# Patient Record
Sex: MALE | Race: WHITE | NOT HISPANIC OR LATINO | Employment: OTHER | URBAN - METROPOLITAN AREA
[De-identification: names, ages, dates, MRNs, and addresses within clinical notes are randomized per-mention and may not be internally consistent; named-entity substitution may affect disease eponyms.]

---

## 2018-01-31 DIAGNOSIS — E78.5 HYPERLIPIDEMIA, UNSPECIFIED HYPERLIPIDEMIA TYPE: ICD-10-CM

## 2018-01-31 NOTE — TELEPHONE ENCOUNTER
atorvastatin (LIPITOR)  Last Written Prescription Date:  11/23/16  Last Fill Quantity: 100,   # refills: 3  Last Office Visit :11/23/16  Future Office visit: 2/19/18

## 2018-02-03 RX ORDER — ATORVASTATIN CALCIUM 10 MG/1
10 TABLET, FILM COATED ORAL DAILY
Qty: 30 TABLET | Refills: 0 | Status: SHIPPED | OUTPATIENT
Start: 2018-02-03 | End: 2018-02-19

## 2018-02-19 ENCOUNTER — OFFICE VISIT (OUTPATIENT)
Dept: INTERNAL MEDICINE | Facility: CLINIC | Age: 63
End: 2018-02-19
Payer: COMMERCIAL

## 2018-02-19 VITALS
DIASTOLIC BLOOD PRESSURE: 83 MMHG | HEIGHT: 71 IN | SYSTOLIC BLOOD PRESSURE: 134 MMHG | HEART RATE: 69 BPM | BODY MASS INDEX: 27.79 KG/M2 | WEIGHT: 198.5 LBS

## 2018-02-19 DIAGNOSIS — M72.2 PLANTAR FASCIITIS: ICD-10-CM

## 2018-02-19 DIAGNOSIS — N52.9 ERECTILE DYSFUNCTION, UNSPECIFIED ERECTILE DYSFUNCTION TYPE: Primary | ICD-10-CM

## 2018-02-19 DIAGNOSIS — Z23 NEED FOR PROPHYLACTIC VACCINATION WITH TETANUS-DIPHTHERIA (TD): ICD-10-CM

## 2018-02-19 DIAGNOSIS — E78.5 HYPERLIPIDEMIA, UNSPECIFIED HYPERLIPIDEMIA TYPE: ICD-10-CM

## 2018-02-19 DIAGNOSIS — Z11.59 NEED FOR HEPATITIS C SCREENING TEST: ICD-10-CM

## 2018-02-19 DIAGNOSIS — Z91.038 HYMENOPTERA ALLERGY: ICD-10-CM

## 2018-02-19 LAB
CHOLEST SERPL-MCNC: 186 MG/DL
HCV AB SERPL QL IA: NONREACTIVE
HDLC SERPL-MCNC: 44 MG/DL
LDLC SERPL CALC-MCNC: 100 MG/DL
NONHDLC SERPL-MCNC: 142 MG/DL
TRIGL SERPL-MCNC: 208 MG/DL

## 2018-02-19 RX ORDER — SILDENAFIL 100 MG/1
100 TABLET, FILM COATED ORAL DAILY PRN
Qty: 88 TABLET | Refills: 1 | Status: SHIPPED | OUTPATIENT
Start: 2018-02-19 | End: 2018-08-16

## 2018-02-19 RX ORDER — SILDENAFIL 100 MG/1
100 TABLET, FILM COATED ORAL DAILY PRN
Qty: 18 TABLET | Refills: 3 | Status: SHIPPED | OUTPATIENT
Start: 2018-02-19 | End: 2018-02-19

## 2018-02-19 RX ORDER — ATORVASTATIN CALCIUM 10 MG/1
10 TABLET, FILM COATED ORAL DAILY
Qty: 100 TABLET | Refills: 3 | Status: SHIPPED | OUTPATIENT
Start: 2018-02-19 | End: 2019-02-17

## 2018-02-19 RX ORDER — EPINEPHRINE 0.3 MG/.3ML
0.3 INJECTION SUBCUTANEOUS
Qty: 0.6 ML | Refills: 3 | Status: SHIPPED | OUTPATIENT
Start: 2018-02-19 | End: 2019-06-18

## 2018-02-19 ASSESSMENT — PAIN SCALES - GENERAL: PAINLEVEL: NO PAIN (0)

## 2018-02-19 NOTE — PROGRESS NOTES
HPI  62-year-old retired  from [employer] presents today for physical examination.  He has been doing well over the past year.  With the stretching and roller stretching of the IT band he resolved the symptoms.  More recently he is experienced right plantar fasciitis.  This occurred when he was jogging without his orthotics.  He has been doing some stretching for this and it is significantly improved but has not resolved.  He is wondering about returning to running.  He is also had some latency and difficulty maintaining erections and is interested in medication for this and we discussed the use of Viagra.  In the past they caused precluded him from getting this.  He just recently started the atorvastatin is tolerated it well he has not had any muscle aching or discomfort associated with this despite his physical activity.  He has had some skin lesions which he is wondering about but none have significantly changed in size.  His blood pressure is being monitored at home and is consistently running less than 130/80 by his report.  We again reviewed nonpharmacologic blood pressure control measures.  Past Medical History:   Diagnosis Date     Hyperlipemia      It band syndrome, right 11/23/2016     Labile hypertension      Plantar fasciitis 2/19/2018     Past Surgical History:   Procedure Laterality Date     thyroglossal duct cystectomy       TONSILLECTOMY       VASECTOMY       Family History   Problem Relation Age of Onset     Coronary Artery Disease Maternal Grandmother      Other Cancer Father      liver     Hypertension Mother      Asthma Sister      Social History     Social History     Marital status:      Spouse name: N/A     Number of children: N/A     Years of education: N/A     Social History Main Topics     Smoking status: Never Smoker     Smokeless tobacco: Never Used     Alcohol use 4.2 oz/week     7 Standard drinks or equivalent per week     Drug use: No     Sexual activity:  "Not Asked     Other Topics Concern     None     Social History Narrative     Answers for HPI/ROS submitted by the patient on 2/19/2018   General Symptoms: No  Skin Symptoms: No  HENT Symptoms: No  EYE SYMPTOMS: No  HEART SYMPTOMS: No  LUNG SYMPTOMS: No  INTESTINAL SYMPTOMS: No  URINARY SYMPTOMS: No  REPRODUCTIVE SYMPTOMS: No  SKELETAL SYMPTOMS: No  BLOOD SYMPTOMS: No  NERVOUS SYSTEM SYMPTOMS: No  MENTAL HEALTH SYMPTOMS: No    Exam:  /83  Pulse 69  Ht 1.802 m (5' 10.95\")  Wt 90 kg (198 lb 8 oz)  BMI 27.73 kg/m2  198 lbs 8 oz  Physical Exam   The patient is alert, oriented with a clear sensorium.   Skin shows no lesions or rashes and good turgor.   Head is normocephalic and atraumatic.   Eyes show PERRLA with benign optic fundi.   Ears show normal TMs bilaterally.   Mouth shows clear oral mucosa.   Neck shows no nodes, thyromegaly or bruits.   Back is non tender.  Lungs are clear to percussion and auscultation.   Heart shows normal S1 and S2 without murmur or gallop.   Abdomen is soft and nontender without masses or organomegaly.   Genitalia show normal testes. No evidence of inguinal hernia.  Extremities show no edema and no evidence of active synovitis.   Neurologic examination shows cranial nerves II-XII intact. Motor shows 5/5 strength. Reflexes are symmetric. Cerebellar testing shows normal tandem gait.  Romberg negative.    ASSESSMENT  1 borderline hypertension well-controlled at home  2 hyperlipidemia needs reevaluation  3 erectile dysfunction  4 right plantar fasciitis  5 IT band friction syndrome resolved  6 history of severe hymenoptera allergy    Plan  We will assess his lipids today was prescribed Viagra because of his hymenoptera allergy to give a prescription for an EpiPen we refilled his atorvastatin he will follow nonpharmacologic blood pressure control measures and reassess in a year    This note was completed using Dragon voice recognition software.  Although reviewed after completion, some " word and grammatical errors may occur.    Ovidio Franco MD  General Internal Medicine  [facility]  [phone]

## 2018-02-19 NOTE — PATIENT INSTRUCTIONS
[facility]: [phone]     [facility] Medication Refill Request Information:  * Please contact your pharmacy regarding ANY request for medication refills.  ** [facility] Prescription Fax = [phone]  * Please allow 3 business days for routine medication refills.  * Please allow 5 business days for controlled substance medication refills.     [facility] Test Result notification information:  *You will be notified with in 7-10 days of your appointment day regarding the results of your test.  If you are on MyChart you will be notified as soon as the provider has reviewed the results and signed off on them.

## 2018-02-19 NOTE — NURSING NOTE
Chief Complaint   Patient presents with     Physical     Patient here for physical.       Noelle Bond CMA at 3:08 PM on 2/19/2018.

## 2018-02-19 NOTE — MR AVS SNAPSHOT
After Visit Summary   2/19/2018    Carlitos Hernandez    MRN: [id number]           Patient Information     Date Of Birth          1955        Visit Information        Provider Department      2/19/2018 3:40 PM Ovidio Franco MD [facility]        Today's Diagnoses     Erectile dysfunction, unspecified erectile dysfunction type    -  1    Need for hepatitis C screening test        Need for prophylactic vaccination with tetanus-diphtheria (TD)        Hyperlipidemia, unspecified hyperlipidemia type        Hymenoptera allergy          Care Instructions    [facility]: [phone]     [facility] Medication Refill Request Information:  * Please contact your pharmacy regarding ANY request for medication refills.  ** [facility] Prescription Fax = [phone]  * Please allow 3 business days for routine medication refills.  * Please allow 5 business days for controlled substance medication refills.     [facility] Test Result notification information:  *You will be notified with in 7-10 days of your appointment day regarding the results of your test.  If you are on MyChart you will be notified as soon as the provider has reviewed the results and signed off on them.            Follow-ups after your visit        Future tests that were ordered for you today     Open Future Orders        Priority Expected Expires Ordered    Lipid Profile Routine  2/19/2019 2/19/2018    Hepatitis C Screen Reflex to HCV RNA Quant and Genotype Routine 2/19/2018 2/19/2019 2/19/2018            Who to contact     Please call your clinic at [phone] to:    Ask questions about your health    Make or cancel appointments    Discuss your medicines    Learn about your test results    Speak to your doctor            Additional Information About Your Visit        MyChart Information     [employer] is an electronic gateway that provides easy, online access to your medical records. With [employer], you can  "request a clinic appointment, read your test results, renew a prescription or communicate with your care team.     To sign up for [employer] visit the website at [email or web address]   You will be asked to enter the access code listed below, as well as some personal information. Please follow the directions to create your username and password.     Your access code is: [email or web address]  Expires: 2018  6:30 AM     Your access code will  in 90 days. If you need help or a new code, please contact your [facility] or call [phone] for assistance.        Care EveryWhere ID     This is your Care EveryWhere ID. This could be used by other organizations to access your [place] medical records  [id number]        Your Vitals Were     Pulse Height BMI (Body Mass Index)             69 1.802 m (5' 10.95\") 27.73 kg/m2          Blood Pressure from Last 3 Encounters:   18 134/83   16 (!) 166/92    Weight from Last 3 Encounters:   18 90 kg (198 lb 8 oz)   16 87.1 kg (192 lb)              We Performed the Following     TDAP ( BOOSTRIX AGES 10-64)     TDAP VACCINE (BOOSTRIX)          Today's Medication Changes          These changes are accurate as of 18  4:26 PM.  If you have any questions, ask your nurse or doctor.               Start taking these medicines.        Dose/Directions    EPINEPHrine 0.3 MG/0.3ML injection 2-pack   Commonly known as:  EPIPEN/ADRENACLICK/or ANY BX GENERIC EQUIV   Used for:  Hymenoptera allergy   Started by:  Ovidio Franco MD        Dose:  0.3 mg   Inject 0.3 mLs (0.3 mg) into the muscle once as needed   Quantity:  0.6 mL   Refills:  3       sildenafil 100 MG tablet   Commonly known as:  VIAGRA   Used for:  Erectile dysfunction, unspecified erectile dysfunction type   Started by:  Ovidio Franco MD        Dose:  100 mg   Take 1 tablet (100 mg) by mouth daily as needed 30 min to 4 hrs before sex. Do not use " with nitroglycerin, terazosin or doxazosin.   Quantity:  88 tablet   Refills:  1            Where to get your medicines      These medications were sent to [facility]/pharmacy #[id number] - [place], [place] - [address]  [address], [facility] [address]     Phone:  [phone]     atorvastatin 10 MG tablet    EPINEPHrine 0.3 MG/0.3ML injection 2-pack         Some of these will need a paper prescription and others can be bought over the counter.  Ask your nurse if you have questions.     Bring a paper prescription for each of these medications     sildenafil 100 MG tablet                Primary Care Provider Office Phone # Fax #    Ovidio Franco [phone]       [address] [place] [address] [address]  [facility] [address]        Equal Access to Services     DIAZ ABEL : Katty ocampoo Soseun, waaxda luqadaha, qaybta kaalmada adeegyada, tin brooks. [facility] [phone].    ATENCIÓN: Si habla español, tiene a desai disposición servicios gratuitos de asistencia lingüística. [facility] [phone].    We comply with applicable federal civil rights laws and [place] laws. We do not discriminate on the basis of race, color, national origin, age, disability, sex, sexual orientation, or gender identity.            Thank you!     Thank you for choosing [facility]  for your care. Our goal is always to provide you with excellent care. Hearing back from our patients is one way we can continue to improve our services. Please take a few minutes to complete the written survey that you may receive in the mail after your visit with us. Thank you!             Your Updated Medication List - Protect others around you: Learn how to safely use, store and throw away your medicines at www.disposemymeds.org.          This list is accurate as of 2/19/18  4:26 PM.  Always use your most recent med list.                   Brand Name Dispense Instructions for use Diagnosis     atorvastatin 10 MG tablet    LIPITOR    100 tablet    Take 1 tablet (10 mg) by mouth daily    Hyperlipidemia, unspecified hyperlipidemia type       EPINEPHrine 0.3 MG/0.3ML injection 2-pack    EPIPEN/ADRENACLICK/or ANY BX GENERIC EQUIV    0.6 mL    Inject 0.3 mLs (0.3 mg) into the muscle once as needed    Hymenoptera allergy       naproxen 500 MG tablet    NAPROSYN    100 tablet    Take 1 tablet (500 mg) by mouth 2 times daily (with meals)    It band syndrome, right       sildenafil 100 MG tablet    VIAGRA    88 tablet    Take 1 tablet (100 mg) by mouth daily as needed 30 min to 4 hrs before sex. Do not use with nitroglycerin, terazosin or doxazosin.    Erectile dysfunction, unspecified erectile dysfunction type

## 2018-07-06 ENCOUNTER — COMMUNICATION - HEALTHEAST (OUTPATIENT)
Dept: NEUROLOGY | Facility: CLINIC | Age: 63
End: 2018-07-06

## 2018-07-06 ENCOUNTER — TELEPHONE (OUTPATIENT)
Dept: INTERNAL MEDICINE | Facility: CLINIC | Age: 63
End: 2018-07-06

## 2018-07-06 NOTE — TELEPHONE ENCOUNTER
Patient requesting appointment to follow up BPPV.  Has appointment with neurosurgery to follow up on Brain Aneurysm.  Scheduled with first available.  Aruna Gill LPN

## 2018-07-06 NOTE — TELEPHONE ENCOUNTER
[facility] Call Center    Phone Message    May a detailed message be left on voicemail: yes    Reason for Call: Other: Pt called back stating he would like to be seen in the ENT clinic for Vertigo - advised that I will have a clinic coordinator call him to get an appt. scheduled     Action Taken: Message routed to:  [facility] ([facility]): see above

## 2018-07-06 NOTE — TELEPHONE ENCOUNTER
[facility] Call Center    Phone Message    May a detailed message be left on voicemail: no    Reason for Call: Other: Pt called seeking an appointment with [facility]. Was seen in the ER today and had a MRI. 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 Was also told he needs to be seen with 3 days by his primary care provider     Action Taken: Other: Pt called seeking an appointment with [facility]. Was seen in the ER today and had a MRI. 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 Was also told he needs to be seen with 3 days by his primary care provider

## 2018-07-07 ENCOUNTER — COMMUNICATION - HEALTHEAST (OUTPATIENT)
Dept: SCHEDULING | Facility: CLINIC | Age: 63
End: 2018-07-07

## 2018-07-09 ENCOUNTER — OFFICE VISIT (OUTPATIENT)
Dept: INTERNAL MEDICINE | Facility: CLINIC | Age: 63
End: 2018-07-09
Payer: COMMERCIAL

## 2018-07-09 ENCOUNTER — COMMUNICATION - HEALTHEAST (OUTPATIENT)
Dept: NEUROSURGERY | Facility: CLINIC | Age: 63
End: 2018-07-09

## 2018-07-09 VITALS
WEIGHT: 198 LBS | DIASTOLIC BLOOD PRESSURE: 86 MMHG | SYSTOLIC BLOOD PRESSURE: 135 MMHG | RESPIRATION RATE: 16 BRPM | BODY MASS INDEX: 27.66 KG/M2 | HEART RATE: 69 BPM

## 2018-07-09 DIAGNOSIS — H81.20 VESTIBULAR NEURONITIS, UNSPECIFIED LATERALITY: ICD-10-CM

## 2018-07-09 DIAGNOSIS — R42 VERTIGO: ICD-10-CM

## 2018-07-09 DIAGNOSIS — H81.10 BENIGN PAROXYSMAL POSITIONAL VERTIGO, UNSPECIFIED LATERALITY: Primary | ICD-10-CM

## 2018-07-09 DIAGNOSIS — Q28.3 CONGENITAL ANOMALY OF CEREBROVASCULAR SYSTEM: ICD-10-CM

## 2018-07-09 RX ORDER — PREDNISONE 20 MG/1
60 TABLET ORAL DAILY
Qty: 33 TABLET | Refills: 0 | Status: SHIPPED | OUTPATIENT
Start: 2018-07-09 | End: 2018-08-16

## 2018-07-09 RX ORDER — METHYLPREDNISOLONE 4 MG
8 TABLET, DOSE PACK ORAL SEE ADMIN INSTRUCTIONS
Status: CANCELLED | OUTPATIENT
Start: 2018-07-09

## 2018-07-09 ASSESSMENT — PAIN SCALES - GENERAL: PAINLEVEL: NO PAIN (0)

## 2018-07-09 NOTE — NURSING NOTE
Chief Complaint   Patient presents with     Hospital F/U     pt is here for a hospital follow up vertigo and aneurysm       Gema Ha CMA at 8:59 AM on 7/9/2018

## 2018-07-09 NOTE — PROGRESS NOTES
[facility]  Primary Care Center   Ave Escalera MD  07/09/2018      Chief Complaint:   Hospital F/U       History of Present Illness:   Carlitos Hernandez is a 62 year old male with a history of hyperlipemia and hypertension who presents with a wife for an emergency department follow up. He initially presented to the [facility] ED with a two days of intermittent room spinning dizziness. This initially started during the night with a spinning sensation and nystagma, he did not notice any positions that caused onset, laying on his left side was the most comfortable. At home he used the Brenda-Hallpike maneuver to trigger nystagmus. Shaking and turning his head causes the dizziness to increase. Since Friday dizziness has been consistent and mild. Friday and Saturday he needed to stay in bed. There is not pulsating in his ears and the ringing in his ears has passed. Nausea stopped Saturday.  The primary diagnosis was benign positional vertigo and he was instructed to follow up with neurology July 17. In the ED he had a head MRI/MRA (findings below) showing no acute infarction or bleeding, incidentally an 8mm right sided carotid aneurysm was found. For the vertigo he was given meclizine to take PRN. He does not know of any family history of aneurysms. He denies headaches and vision changes. He is able to continue with exercise even with occasional dizziness. Currently he is unable to walk normally due to dizziness. He does not require laying down all day anymore. Laying on his left side and not moving his head is relief. He denies being sick the week before, he did have a tick bite but believes it was a wood tick and did not develop a rash. He denies ear pain, ear fullness, ringing and pulsating in his ear. There is a history of hearing loss and he denies acute changes. He would like to see therapy for this. Meclizine made him feel poor so he only tried it once.     His normal exercise routine includes aerobic weight lifting  and cardio. He monitored his blood pressure at home and had readings around 122/82. He cannot drive because of the visual spinning and lack of balance. His wife mentioned that he does not drink as much water as her.     Review of Systems:   Pertinent items are noted in HPI, remainder of complete ROS is negative.      Active Medications:      atorvastatin (LIPITOR) 10 MG tablet, Take 1 tablet (10 mg) by mouth daily, Disp: 100 tablet, Rfl: 3     EPINEPHrine (EPIPEN/ADRENACLICK/OR ANY BX GENERIC EQUIV) 0.3 MG/0.3ML injection 2-pack, Inject 0.3 mLs (0.3 mg) into the muscle once as needed, Disp: 0.6 mL, Rfl: 3     naproxen (NAPROSYN) 500 MG tablet, Take 1 tablet (500 mg) by mouth 2 times daily (with meals) (Patient not taking: Reported on 2/19/2018), Disp: 100 tablet, Rfl: 1     sildenafil (VIAGRA) 100 MG tablet, Take 1 tablet (100 mg) by mouth daily as needed 30 min to 4 hrs before sex. Do not use with nitroglycerin, terazosin or doxazosin., Disp: 88 tablet, Rfl: 1      Allergies:   Wasps [hornets]      Past Medical History:  Hyperlipemia  IT and syndrome, right  Labile hypertension  Plantar fasciitis     Past Surgical History:  Thyroglossal duct cystectomy  Tonsillectomy  Vasectomy    Family History:   Maternal grandmother: coronary artery disease  Father: Liver cancer  Mother: hypertension  Sister: asthma     Social History:     Non smoker    Physical Exam:   /89  Pulse 69  Resp 16  Wt 89.8 kg (198 lb)  BMI 27.66 kg/m2   Constitutional: Alert, oriented, pleasant, no acute distress  Head: Normocephalic, atraumatic  Eyes: Extra-ocular movements intact, no scleral icterus.   ENT: Oropharynx clear, moist mucus membranes, good dentition, Bilateral TMs clear  Neck: Supple, no lymphadenopathy, no thyromegaly   Cardiovascular: Regular rate and rhythm, no murmurs, rubs or gallops, peripheral pulses full/symmetric  Respiratory: Good air movement bilaterally, lungs clear, no  wheezes/rales/rhonchi  Musculoskeletal: No edema, normal muscle tone, normal gait  Neurological: Alert and oriented, cranial nerves 2-12 intact, strength 5/5 throughout, sensation to light touch intact, reflexes 2+ bilaterally. Romberg test normal. Gait normal, however patient unable to heal to toe walk without stumbling. Nystagmus visualized with left horizontal and upward gaze. Cerebellar testing normal. Head thrust test normal.  Skin: No rashes/lesions  Psychiatric: normal mentation, affect and mood      MRI/MRA 7/6/18  CONCLUSION: HEAD MRI: 1. No acute intracranial finding. No evidence for recent ischemia, intracranial hemorrhage, or mass. HEAD MRA: 1. 8 mm superiorly projecting right carotid terminus aneurysm. Follow-up referral to neurointerventional radiology or vascular neurosurgery recommended. 2. Otherwise negative brain MRA. NECK MRA: 1. No evidence for dissection or hemodynamically significant narrowing in the neck based on NASCET criteria.     Assessment and Plan:  Vertigo  Differential includes BPPV vs vestibular neuritis. He does not have any central symptoms on exam and recent brain MRI did not show any brain stem or cerebellar lesions. Discussed the course of steroids for symptomatic treatment, He will take the course of steroids given the severity of his symptoms. He would like to try vestibular rehab as well.   - PHYSICAL THERAPY REFERRAL    Congenital anomaly of cerebrovascular system  8mm right sided terminal carotid aneurysm discovered incidentally on recent MRI. Unlikely this is contributing to symptoms, he has a neurosurgery appointment at [facility] scheduled tomorrow.   - NEUROSURGERY REFERRAL        Follow-up: kati        Scribe Disclosure:   Yoan GOMEZ, am serving as a scribe to document services personally performed by Ave Escalera MD at this visit, based upon the provider's statements to me. All documentation has been reviewed by the aforementioned provider prior to being  entered into the official medical record.     Portions of this medical record were completed by a scribe. UPON MY REVIEW AND AUTHENTICATION BY ELECTRONIC SIGNATURE, this confirms (a) I performed the applicable clinical services, and (b) the record is accurate.   Ave Escalera MD  Internal Medicine    40 min spent face to face, of which >50% time spent on counseling/coordinating care exclusive of any procedure time

## 2018-07-09 NOTE — PATIENT INSTRUCTIONS
[facility] Phone Number [phone]  [facility] Medication Refill Request Information:  * Please contact your pharmacy regarding ANY request for medication refills.  ** [facility] Prescription Fax = [phone]  * Please allow 3 business days for routine medication refills.  * Please allow 5 business days for controlled substance medication refills.     [facility] Test Result notification information:  *You will be notified with in 7-10 days of your appointment day regarding the results of your test.  If you are on MyChart you will be notified as soon as the provider has reviewed the results and signed off on them.      Neurosurgery [phone] ([facility], 3rd Floor E)

## 2018-07-09 NOTE — MR AVS SNAPSHOT
After Visit Summary   7/9/2018    Carlitos Hernandez    MRN: [id number]           Patient Information     Date Of Birth          1955        Visit Information        Provider Department      7/9/2018 9:00 AM Ave Escalera MD [facility]        Today's Diagnoses     Benign paroxysmal positional vertigo, unspecified laterality    -  1    Vestibular neuronitis, unspecified laterality        Vertigo        Congenital anomaly of cerebrovascular system          Care Instructions    Primary Care Center Phone Number [phone]  Primary Care Center Medication Refill Request Information:  * Please contact your pharmacy regarding ANY request for medication refills.  ** [facility] Prescription Fax = [phone]  * Please allow 3 business days for routine medication refills.  * Please allow 5 business days for controlled substance medication refills.     Primary Care Center Test Result notification information:  *You will be notified with in 7-10 days of your appointment day regarding the results of your test.  If you are on MyChart you will be notified as soon as the provider has reviewed the results and signed off on them.      Neurosurgery [phone] ([facility], 3rd Floor E)               Follow-ups after your visit        Additional Services     NEUROSURGERY REFERRAL       Your provider has referred you to: [facility]: Neurosurgery Clinic [facility] [phone]   [email or web address]    Please be aware that coverage of these services is subject to the terms and limitations of your health insurance plan.  Call member services at your health plan with any benefit or coverage questions.      Please bring the following with you to your appointment:    (1) Any X-Rays, CTs or MRIs which have been performed.  Contact the facility where they were done to arrange for  prior to your scheduled appointment.   (2) List of current medications  (3) This referral request  "  (4) Any documents/labs given to you for this referral            PHYSICAL THERAPY REFERRAL       *This therapy referral will be filtered to a centralized scheduling office at [facility] and the patient will receive a call to schedule an appointment at a [facility] location most convenient for them. *     [facility] provides Physical Therapy evaluation and treatment and many specialty services across the [facility] system.  If requesting a specialty program, please choose from the list below.    If you have not heard from the scheduling office within 2 business days, please call [phone] for all locations, with the exception of [place], please call [phone] and [facility], please call [phone]  Treatment: Evaluation & Treatment  Special Instructions/Modalities:   Special Programs: Balance/Vestibular    Please be aware that coverage of these services is subject to the terms and limitations of your health insurance plan.  Call member services at your health plan with any benefit or coverage questions.      **Note to Provider:  If you are referring outside of [facility] for the therapy appointment, please list the name of the location in the \"special instructions\" above, print the referral and give to the patient to schedule the appointment.                  Your next 10 appointments already scheduled     Jul 10, 2018  3:00 PM CDT   Vestibular Eval with Nelda Bailey, PT   [facility] ([facility])    [address] [address] [phone]            Aug 08, 2018  8:00 AM CDT   Vestibular Eval with Missy Anderson, PT   [facility] [place], Physical Therapy - Outpatient ([facility])    [address]  [place] [id number] [phone]            Aug 08, 2018  2:00 PM CDT   (Arrive by 1:45 PM)   Vestibular Eval with Willem Del Valle, PT   [facility] ([facility] [place])    [address]  4th Floor  [facility] [address] [phone]              Who to contact     Please call your clinic at [phone] to:    Ask questions about your health    Make or cancel appointments    Discuss your medicines    Learn about your test results    Speak to your doctor            Additional Information About Your Visit        MyChart Information     [employer] is an electronic gateway that provides easy, online access to your medical records. With [employer], you can request a clinic appointment, read your test results, renew a prescription or communicate with your care team.     To sign up for [employer] visit the website at [email or web address]   You will be asked to enter the access code listed below, as well as some personal information. Please follow the directions to create your username and password.     Your access code is: [email or web address]  Expires: 10/7/2018  6:31 AM     Your access code will  in 90 days. If you need help or a new code, please contact your [facility] or call [phone] for assistance.        Care EveryWhere ID     This is your Care EveryWhere ID. This could be used by other organizations to access your [facility] medical records  [id number]        Your Vitals Were     Pulse Respirations BMI (Body Mass Index)             69 16 27.66 kg/m2          Blood Pressure from Last 3 Encounters:   18 135/86   18 134/83   16 (!) 166/92    Weight from Last 3 Encounters:   18 89.8 kg (198 lb)   18 90 kg (198 lb 8 oz)   16 87.1 kg (192 lb)              We Performed the Following     NEUROSURGERY REFERRAL     PHYSICAL THERAPY REFERRAL        Primary Care Provider Office Phone # Fax #    Ovidio Franco [phone]       [address] [place] [address]  [facility] [address]        Equal Access to Services     DIAZ ABEL AH: Katty dangelo  Ramaseun, briida luqadaha, qamelitonta kalopez szymanski, tin reis hennalinda billymyra laHannahevelin cecilia. So [facility] [phone].    ATENCIÓN: Si delaney reeves, tiene a desai disposición servicios gratuitos de asistencia lingüística. Leah al [phone].    We comply with applicable federal civil rights laws and [place] laws. We do not discriminate on the basis of race, color, national origin, age, disability, sex, sexual orientation, or gender identity.            Thank you!     Thank you for choosing [facility]  for your care. Our goal is always to provide you with excellent care. Hearing back from our patients is one way we can continue to improve our services. Please take a few minutes to complete the written survey that you may receive in the mail after your visit with us. Thank you!             Your Updated Medication List - Protect others around you: Learn how to safely use, store and throw away your medicines at www.disposemymeds.org.          This list is accurate as of 7/9/18 10:21 AM.  Always use your most recent med list.                   Brand Name Dispense Instructions for use Diagnosis    atorvastatin 10 MG tablet    LIPITOR    100 tablet    Take 1 tablet (10 mg) by mouth daily    Hyperlipidemia, unspecified hyperlipidemia type       EPINEPHrine 0.3 MG/0.3ML injection 2-pack    EPIPEN/ADRENACLICK/or ANY BX GENERIC EQUIV    0.6 mL    Inject 0.3 mLs (0.3 mg) into the muscle once as needed    Hymenoptera allergy       naproxen 500 MG tablet    NAPROSYN    100 tablet    Take 1 tablet (500 mg) by mouth 2 times daily (with meals)    It band syndrome, right       sildenafil 100 MG tablet    VIAGRA    88 tablet    Take 1 tablet (100 mg) by mouth daily as needed 30 min to 4 hrs before sex. Do not use with nitroglycerin, terazosin or doxazosin.    Erectile dysfunction, unspecified erectile dysfunction type

## 2018-07-10 ENCOUNTER — OFFICE VISIT - HEALTHEAST (OUTPATIENT)
Dept: NEUROSURGERY | Facility: CLINIC | Age: 63
End: 2018-07-10

## 2018-07-10 ENCOUNTER — HOSPITAL ENCOUNTER (OUTPATIENT)
Dept: PHYSICAL THERAPY | Facility: CLINIC | Age: 63
End: 2018-07-10
Payer: COMMERCIAL

## 2018-07-10 DIAGNOSIS — H81.21 VESTIBULAR NEURONITIS OF RIGHT EAR: ICD-10-CM

## 2018-07-10 DIAGNOSIS — Z01.818 PRE-OP EXAM: ICD-10-CM

## 2018-07-10 DIAGNOSIS — R42 DIZZINESS: Primary | ICD-10-CM

## 2018-07-10 PROCEDURE — 97162 PT EVAL MOD COMPLEX 30 MIN: CPT | Mod: GP | Performed by: PHYSICAL THERAPIST

## 2018-07-10 PROCEDURE — 40000840 ZZHC STATISTIC PT VESTIBULAR VISIT: Mod: GP | Performed by: PHYSICAL THERAPIST

## 2018-07-10 PROCEDURE — 97112 NEUROMUSCULAR REEDUCATION: CPT | Mod: GP | Performed by: PHYSICAL THERAPIST

## 2018-07-10 ASSESSMENT — MIFFLIN-ST. JEOR: SCORE: 1679.37

## 2018-07-16 ENCOUNTER — RECORDS - HEALTHEAST (OUTPATIENT)
Dept: ADMINISTRATIVE | Facility: OTHER | Age: 63
End: 2018-07-16

## 2018-07-16 ENCOUNTER — AMBULATORY - HEALTHEAST (OUTPATIENT)
Dept: NEUROSURGERY | Facility: CLINIC | Age: 63
End: 2018-07-16

## 2018-07-16 DIAGNOSIS — I67.1 CEREBRAL ANEURYSM, NONRUPTURED: ICD-10-CM

## 2018-07-16 DIAGNOSIS — Z01.818 PRE-OP EVALUATION: ICD-10-CM

## 2018-07-17 ENCOUNTER — COMMUNICATION - HEALTHEAST (OUTPATIENT)
Dept: NEUROSURGERY | Facility: CLINIC | Age: 63
End: 2018-07-17

## 2018-07-17 NOTE — PROGRESS NOTES
" 07/10/18 1500   Quick Adds   Quick Adds Vestibular Eval   Type of Visit Initial OP PT Evaluation   General Information   Start of Care Date 07/10/18   Referring Physician vAe Escalera MD    Orders Evaluate and Treat as Indicated   Medical Diagnosis BPPV, unspecified laterality; vestibular neuritis; vertigo   Onset of illness/injury or Date of Surgery 07/04/18   Pertinent history of current problem (include personal factors and/or comorbidities that impact the POC) Patient presenting with dizziness symptoms that started July 4th. Reporting that symptoms seemed to be sporadic and that he was unable to co-relate it with any movement. The next 2 days felt fine, but then awoke Friday with significant increase in symptoms. Went into the ER, MRA and MRI had been completed wihtout acute findings. 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 His medical team does not feel as though this is contributing to dizziness symptoms. The patient reports that they had tried vega pike testing at the ER but did not find anything significant. Likewise, he had taken himself through the epley maneuver and gonsalez daroff drills on both sides without change in symptoms. Patient reporting that dizziness had been constant and lasted several hours during second episode, more suspect for vestibular hypofunction. The patient has started taking prednisone as of yesterday and feels as though he has noticed a significant improvement. Continues to feel a little \"off kilter\" but generally feels back to his normal self. Patient reporting some ringing in the ears at baseline and indicates that he had felt as though his eyes were moving.    Patient role/Employment history Retired   Home/Community Accessibility Comments Lives with spouse; independent with all mobility at baseline   Assistive Devices Comments Is not utilizing an Ad   Patient/Family Goals Statement resolve dizziness   Fall Risk Screen   Fall screen completed " by PT   Have you fallen 2 or more times in the past year? No   Have you fallen and had an injury in the past year? No   Is patient a fall risk? Yes   Fall screen comments Patient is inherently at increased risk of falling due to elevated dizziness symptoms.    Functional Scales   Functional Scales and Outcomes DHI = 32/100   Pain   Patient currently in pain No   Cognitive Status Examination   Orientation orientation to person, place and time   Level of Consciousness alert   Follows Commands and Answers Questions 100% of the time   Personal Safety and Judgment intact   Memory intact   Posture   Posture Forward head position;Protracted shoulders   Range of Motion (ROM)   ROM Comment Bilateral LEs functionally assessed through generla mobility screening and determined to be within functional limits.    Strength   Strength Comments Bilateral LEs funcitonally assessed through general mobility screening and determined to be within functional limits and graded globally at least 3/5 as patient is able to lift bilateral LEs against gravity without restriction.    Bed Mobility   Bed Mobility Comments IND   Transfer Skills   Transfer Comments IND   Gait   Gait Comments IND   Balance   Balance Comments Patient demonstrating occasional instability with positional changes. However, this was slight and patient had been able to self correct without difficulty.    Sensory Examination   Sensory Perception no deficits were identified   Coordination   Coordination no deficits were identified   Muscle Tone   Muscle Tone no deficits were identified   Cervicogenic Screen   Neck ROM Cervical ROM is wihtin normative range and symmetrical; approrpiate for testing this date.    Vertebral Artery Test Normal   Oculomotor Exam   Smooth Pursuit Normal   Saccades Normal   VOR Normal   Rapid Head Thrust Corrective Saccade R head thrust   Rapid Head Thrust Comments Suspect for R sided vestibular hypofunction   Infrared Goggle Exam or Frenzel Lense  Exam   Vestibular Suppressant in Last 24 Hours? No   Exam completed with Infrared Goggles   Spontaneous Nystagmus Horizontal L   Gaze Evoked Nystagmus Horizontal L   Head Shake Horizontal Nystagmus Horizontal L   Cleveland-Hallpike (right) Negative   Cleveland-Hallpike (Left) Negative   HSCC Supine Roll Test (Right) Negative   HSCC Supine Roll Test (Left) Negative   Planned Therapy Interventions   Planned Therapy Interventions balance training;neuromuscular re-education;other (see comments)  (Vestibular rehab)   Clinical Impression   Criteria for Skilled Therapeutic Interventions Met yes, treatment indicated   PT Diagnosis Decreased safe functional mobility and activity [place]elrance   Influenced by the following impairments Elevated dizziness; limitations in postural stability   Functional limitations due to impairments Decreased safe functional mobility and activity toelrance   Clinical Presentation Evolving/Changing   Clinical Decision Making (Complexity) Moderate complexity   Therapy Frequency 1 time/week   Predicted Duration of Therapy Intervention (days/wks) 4 weeks   Risk & Benefits of therapy have been explained Yes   Patient, Family & other staff in agreement with plan of care Yes   Clinical Impression Comments Patient presenting with dizziness symptoms. Subjective rehporting and objective findings are most consistent with R sided vestibular hypofunction. Will benefit from participation in skilled PT services to address dizziness symptoms and facilitate return to baseline.    GOALS   PT Eval Goals 1;2   Goal 1   Goal Identifier DVA   Goal Description The patient will score wihtin 2-3 lines of SVA with performance of DVA to demonstrate that his vestibular system is functioning optimally and able to support functional gaze stability.    Target Date 08/10/18   Goal 2   Goal Identifier DHI   Goal Description The patient will report a DHI score of 14/100 or less to demonstrate a significant improvement in dizziness symptom  severity.    Target Date 08/10/18   Total Evaluation Time   Total Evaluation Time (Minutes) 40

## 2018-07-24 ENCOUNTER — OFFICE VISIT (OUTPATIENT)
Dept: INTERNAL MEDICINE | Facility: CLINIC | Age: 63
End: 2018-07-24
Payer: COMMERCIAL

## 2018-07-24 VITALS
WEIGHT: 193.9 LBS | RESPIRATION RATE: 16 BRPM | HEART RATE: 82 BPM | SYSTOLIC BLOOD PRESSURE: 147 MMHG | DIASTOLIC BLOOD PRESSURE: 86 MMHG | BODY MASS INDEX: 27.09 KG/M2

## 2018-07-24 DIAGNOSIS — Z00.00 HEALTH MAINTENANCE EXAMINATION: Primary | ICD-10-CM

## 2018-07-24 LAB — INTERPRETATION ECG - MUSE: NORMAL

## 2018-07-24 ASSESSMENT — PAIN SCALES - GENERAL: PAINLEVEL: NO PAIN (0)

## 2018-07-24 NOTE — PROGRESS NOTES
CC: Carlitos Hernandez presents for pre-operative evaluation as requested by Dr. Tigist Paula with [facility] prior to a scheduled R craniotomy and clipping of R carotid terminus aneurysm and complex intraoperative angiograms.    Date of Surgery/ Procedure: 8/7/18  Type of Anesthesia Anticipated: General      HPI:                                                      Feels well other than some vertigo. He saw a physical therapist. He is not doing the recommended exercises but does other exercises. His vertigo is getting better but is still present.     Chest pain: Not with exertion, climbs a stair-stepper at gym at running pace  SOB: none with activity  Physical activity: works out with stair stepper  Hx of heart disease: No MIs or other heart disease  Hx of lung disease: No history of lung disease  Use of blood thinners, ASA, NSAIDs, etc: No  Hx of adverse reaction to anesthesia: None with prior surgery  Hx of surgeries: in 20s had surgery on thyroid    No history bleeding disorders or clots. The only meds he is on are atorvastatin daily, and on a prednisone taper (three days left).      Mother in 90s has some heart issues: pt is unsure exactly.     MEDICAL HISTORY:                                                      Patient Active Problem List    Diagnosis Date Noted     Plantar fasciitis 02/19/2018     Priority: Medium     Hyperlipidemia, unspecified hyperlipidemia type 11/23/2016     Priority: Medium     It band syndrome, right 11/23/2016     Priority: Medium     Hyperlipemia      Priority: Medium       Medications and allergies reviewed and updated.  Family and social history reviewed and updated      REVIEW OF SYSTEMS:                                                    Pertinent items are noted in HPI.      EXAM:                                                    /86  Pulse 82  Resp 16  Wt 88 kg (193 lb 14.4 oz)  BMI 27.09 kg/m2    GENERAL APPEARANCE: healthy, alert and no distress      EYES: EOMI, - PERRL     HENT: mouth without ulcers or lesions     NECK: no carotid bruits, FROM without pain      RESP: lungs clear to auscultation - no rales, rhonchi or wheezes     CV: regular rates and rhythm, normal S1 S2, no S3 or S4 and no murmur, click or rub -     ABDOMEN:  soft, nontender, no HSM or masses and bowel sounds normal     MS: extremities normal- no gross deformities noted, no evidence of inflammation in joints, FROM in all extremities.     SKIN: no suspicious lesions or rashes     NEURO: Normal strength and tone, sensory exam grossly normal, mentation intact and speech normal     PSYCH: mentation appears normal. and affect normal/bright    DIAGNOSTICS:                                                    EKG was required by neurosurgeon. EKG showed rate of 72,  ms, QRS 90 ms,  and QTc 424, P-R-T 32-21-34.     IMPRESSION:                                                    Carlitos Hernandez is here for a pre-operative evaluation prior to planned craniotomy for R aneurysm clipping of R carotid terminus.     Cardiac Risk:  -Given that this is a low risk procedure, the risk of adverse cardiac events is low.   - Given that the risk of MACE is >1%, but Carlitos Hernandez is able to perform >= 4METS of physical activity, it is reasonable to proceed with the surgery without further diagnostic testing based on the 2014 ACC/AHA guidelines.       Pulmonary Risk:  Carlitos Hernandez has no additional pulmonary risks.      RECOMMENDATIONS:                                                    -Approval given to proceed with proposed procedure, without further diagnostic evaluation  --Patient is to avoid NSAIDs 10 days prior.   --Patient is to take all scheduled medications, except: atorvastatin the morning of    #HTN, checked every day this past winter. He says typically runs 120s. He says it is always elevated when he visits the doctor.     #Findings suggestive of RBBB on EKG  Does not have Wide QRS. Has RSR' in V1, aVF.  There are no inverted T waves or other T wave findings.  This is not a contra-indication to surgery.     Sisi Mclean MD PhD  Internal Medicine, PGY-2  p     Copy of this evaluation report is provided to requesting physician.    [facility] Preop Guidelines

## 2018-07-24 NOTE — NURSING NOTE
Chief Complaint   Patient presents with     Pre-Op Exam     pt is here for a pre op exam       Gema Ha CMA at 8:30 AM on 7/24/2018

## 2018-07-24 NOTE — MR AVS SNAPSHOT
After Visit Summary   7/24/2018    Carlitos Hernandez    MRN: [id number]           Patient Information     Date Of Birth          1955        Visit Information        Provider Department      7/24/2018 9:05 AM Sisi Mclean MD [facility]        Today's Diagnoses     Health maintenance examination    -  1      Care Instructions    Primary Care Center Phone Number [phone]  Primary Care Center Medication Refill Request Information:  * Please contact your pharmacy regarding ANY request for medication refills.  ** [facility] Prescription Fax = [phone]  * Please allow 3 business days for routine medication refills.  * Please allow 5 business days for controlled substance medication refills.     Primary Care Center Test Result notification information:  *You will be notified with in 7-10 days of your appointment day regarding the results of your test.  If you are on MyChart you will be notified as soon as the provider has reviewed the results and signed off on them.               [facility]         Internal Medicine Resident                   Continuity Clinic    Who We Are    Resident Continuity Clinic is a part of the [facility].  Resident physicians see patients independently and establish a relationship with them over the course of their three-year residency program.  As with the Primary Care Clinic, our Resident Continuity Clinic models a group practice.  If your doctor is not available, you will be able to see another resident physician.  At the end of a resident s training, patients will be transitioned to a new resident physician for ongoing care.     We treat patients with a wide array of medical needs from routine physicals, to acute illnesses, to diabetes and blood pressure management, to complex medical illness.  What is a Resident Physician?    Resident physicians hold medical degrees and are doctors. They are training to become  specialists in Internal Medicine. They work under the supervision of board-certified faculty physicians.  Expectations for Your Care    We strive to provide accessible, quality care at all times.    In order to provide this care, it is best to see your primary care resident doctor consistently rather switching between providers.  In the event you do see another physician, you should schedule a follow-up visit with your usual primary care doctor.    If you are transitioning your care from another clinic, it is helpful to have your records available for your doctor to review.    We do not prescribe controlled substances, such as ADD medications or narcotic pain medications, on your first visit.  We will review your health records and concerns prior to devising a treatment plan with you in order to provide the best care.      Clinic Services     Extended clinic hours; patient  to help navigate your visit;  parking; laboratory and imaging services with evening and weekend hours    Multiple medical and surgical specialties in one building    Complementary services, including Nutrition, Integrative Medicine, Pharmacy consultations, Mental and Behavioral Health, Sports Medicine and Physical Therapy    Thank You    We would like to thank you for choosing the [facility] Internal [facility] Resident Continuity Clinic for your primary care. You are making a priceless contribution to the training of the next generation of health care practitioners.     Contact us at [phone] for appointments or questions.    Resident Clinic Hours are Tuesdays and Thursdays, 7:30am-5:00pm    Residents  Gema Robles MD   (Female )   Aruna Pompa MD   (Female)   Thaddeus Francis MD  (Male)   Arash Benz MD  (Male)   Marycarmen Lam MD   (Female)   Dino Quiroz MD  (Male)    Kem Steele MD  (Male)   Bruce Ramirez MD  (Male)   Arash Josue MD (Male)   Major Brandt MD  (Male)   Sisi Mclean,  MD (Female)    Morena Root MD (Female)   Yaneli Wyatt MD  (Male)   Rebeca Kruse MD(Female)   Mahsa Ley MD  (Female)    Supervising Physicians   Melonie Bridges, MD Maricarmen Hassan, MD Nik Valdivia, MD Ovidio Franco, MD Ave Escalera, MD Kimberly Pabon, MD José Antonio Giles, MD Sunshine Greenwood, MD Antonella Murray MD                    Follow-ups after your visit        Follow-up notes from your care team     Return in about 3 months (around 10/24/2018) for BP Recheck.      Who to contact     Please call your clinic at [phone] to:    Ask questions about your health    Make or cancel appointments    Discuss your medicines    Learn about your test results    Speak to your doctor            Additional Information About Your Visit        MyChart Information     Silver Push is an electronic gateway that provides easy, online access to your medical records. With Silver Push, you can request a clinic appointment, read your test results, renew a prescription or communicate with your care team.     To sign up for Silver Push visit the website at [email or web address]   You will be asked to enter the access code listed below, as well as some personal information. Please follow the directions to create your username and password.     Your access code is: [email or web address]  Expires: 10/7/2018  6:31 AM     Your access code will  in 90 days. If you need help or a new code, please contact your [facility] or call [phone] for assistance.        Care EveryWhere ID     This is your Care EveryWhere ID. This could be used by other organizations to access your [place] medical records  [id number]        Your Vitals Were     Pulse Respirations BMI (Body Mass Index)             82 16 27.09 kg/m2          Blood Pressure from Last 3 Encounters:   18 147/86   18 135/86   18 134/83    Weight from Last 3 Encounters:   18 88 kg (193 lb 14.4  oz)   07/09/18 89.8 kg (198 lb)   02/19/18 90 kg (198 lb 8 oz)              We Performed the Following     EKG Performed in Clinic w/ Provider Reading Fee        Primary Care Provider Office Phone # Fax #    Ovidio Franco [phone]       [address] [place] [address]  [facility] [address]        Equal Access to Services     [facility] : Hadii aad ku hadasho Soomaali, waaxda luqadaha, qaybta kaalmada adeegyada, waxay idiin hayaan adeeg kharash la'aan ah. So [facility] [phone].    ATENCIÓN: Si habla español, tiene a desai disposición servicios gratuitos de asistencia lingüística. Keyame al [phone].    We comply with applicable federal civil rights laws and [place] laws. We do not discriminate on the basis of race, color, national origin, age, disability, sex, sexual orientation, or gender identity.            Thank you!     Thank you for choosing [facility]  for your care. Our goal is always to provide you with excellent care. Hearing back from our patients is one way we can continue to improve our services. Please take a few minutes to complete the written survey that you may receive in the mail after your visit with us. Thank you!             Your Updated Medication List - Protect others around you: Learn how to safely use, store and throw away your medicines at www.disposemymeds.org.          This list is accurate as of 7/24/18  9:17 AM.  Always use your most recent med list.                   Brand Name Dispense Instructions for use Diagnosis    atorvastatin 10 MG tablet    LIPITOR    100 tablet    Take 1 tablet (10 mg) by mouth daily    Hyperlipidemia, unspecified hyperlipidemia type       EPINEPHrine 0.3 MG/0.3ML injection 2-pack    EPIPEN/ADRENACLICK/or ANY BX GENERIC EQUIV    0.6 mL    Inject 0.3 mLs (0.3 mg) into the muscle once as needed    Hymenoptera allergy       naproxen 500 MG tablet    NAPROSYN    100 tablet    Take 1 tablet (500 mg) by mouth 2 times daily  (with meals)    It band syndrome, right       predniSONE 20 MG tablet    DELTASONE    33 tablet    Take 3 tablets (60 mg) by mouth daily 60 mg for 5 days, then 40 mg for 5 days, then 20 mg for 5 days, then 10 mg (1/2 tab) x 5 days    Vertigo       sildenafil 100 MG tablet    VIAGRA    88 tablet    Take 1 tablet (100 mg) by mouth daily as needed 30 min to 4 hrs before sex. Do not use with nitroglycerin, terazosin or doxazosin.    Erectile dysfunction, unspecified erectile dysfunction type

## 2018-07-24 NOTE — PATIENT INSTRUCTIONS
Primary Care Center Phone Number [phone]  Primary Care Center Medication Refill Request Information:  * Please contact your pharmacy regarding ANY request for medication refills.  ** [facility] Prescription Fax = [phone]  * Please allow 3 business days for routine medication refills.  * Please allow 5 business days for controlled substance medication refills.     Primary Care Center Test Result notification information:  *You will be notified with in 7-10 days of your appointment day regarding the results of your test.  If you are on MyChart you will be notified as soon as the provider has reviewed the results and signed off on them.               [facility]         Internal Medicine Resident                   Continuity Clinic    Who We Are    Resident Continuity Clinic is a part of the [facility].  Resident physicians see patients independently and establish a relationship with them over the course of their three-year residency program.  As with the Primary Care Clinic, our Resident Continuity Clinic models a group practice.  If your doctor is not available, you will be able to see another resident physician.  At the end of a resident s training, patients will be transitioned to a new resident physician for ongoing care.     We treat patients with a wide array of medical needs from routine physicals, to acute illnesses, to diabetes and blood pressure management, to complex medical illness.  What is a Resident Physician?    Resident physicians hold medical degrees and are doctors. They are training to become specialists in Internal Medicine. They work under the supervision of board-certified faculty physicians.  Expectations for Your Care    We strive to provide accessible, quality care at all times.    In order to provide this care, it is best to see your primary care resident doctor consistently rather switching between providers.  In the event you do see another physician, you  should schedule a follow-up visit with your usual primary care doctor.    If you are transitioning your care from another clinic, it is helpful to have your records available for your doctor to review.    We do not prescribe controlled substances, such as ADD medications or narcotic pain medications, on your first visit.  We will review your health records and concerns prior to devising a treatment plan with you in order to provide the best care.      Clinic Services     Extended clinic hours; patient  to help navigate your visit;  parking; laboratory and imaging services with evening and weekend hours    Multiple medical and surgical specialties in one building    Complementary services, including Nutrition, Integrative Medicine, Pharmacy consultations, Mental and Behavioral Health, Sports Medicine and Physical Therapy    Thank You    We would like to thank you for choosing the [facility] Internal Medicine Resident Continuity Clinic for your primary care. You are making a priceless contribution to the training of the next generation of health care practitioners.     Contact us at [phone] for appointments or questions.    Resident Clinic Hours are Tuesdays and Thursdays, 7:30am-5:00pm    Residents  Gema Robles MD   (Female )   Aruna Pompa MD   (Female)   Thaddeus Francis MD  (Male)   Arash Benz MD  (Male)   Marycarmen Lam MD   (Female)   Dino Quiroz MD  (Male)    Kem Steele MD  (Male)   Bruce Ramirez MD  (Male)   Arash Josue MD (Male)   Major Brandt MD  (Male)   Sisi Mclean MD (Female)    Morena Root MD (Female)   Yaneli Wyatt MD  (Male)   Rebeca Kruse MD(Female)   Mahsa Ley MD  (Female)    Supervising Physicians   MD Maricarmen Rosenthal MD Briar Duffy, MD James Langland, MD Mary Logeais, MD Tanya Melnik, MD Charles Moldow, MD Heather Thompson Buum, MD Kathleen Watson, MD

## 2018-07-24 NOTE — PROGRESS NOTES
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

## 2018-08-06 ENCOUNTER — AMBULATORY - HEALTHEAST (OUTPATIENT)
Dept: LAB | Facility: HOSPITAL | Age: 63
End: 2018-08-06

## 2018-08-06 ENCOUNTER — AMBULATORY - HEALTHEAST (OUTPATIENT)
Dept: NEUROSURGERY | Facility: CLINIC | Age: 63
End: 2018-08-06

## 2018-08-06 DIAGNOSIS — Z01.818 PRE-OP EXAM: ICD-10-CM

## 2018-08-06 LAB
ALBUMIN UR-MCNC: NEGATIVE MG/DL
ANION GAP SERPL CALCULATED.3IONS-SCNC: 7 MMOL/L (ref 5–18)
APPEARANCE UR: CLEAR
APTT PPP: 25 SECONDS (ref 24–37)
BILIRUB UR QL STRIP: NEGATIVE
BUN SERPL-MCNC: 18 MG/DL (ref 8–22)
CALCIUM SERPL-MCNC: 9.6 MG/DL (ref 8.5–10.5)
CHLORIDE BLD-SCNC: 105 MMOL/L (ref 98–107)
CLOSURE TME COLL+EPINEP BLD: 88 SEC (ref 1–180)
CO2 SERPL-SCNC: 29 MMOL/L (ref 22–31)
COLOR UR AUTO: YELLOW
CREAT SERPL-MCNC: 0.87 MG/DL (ref 0.7–1.3)
ERYTHROCYTE [DISTWIDTH] IN BLOOD BY AUTOMATED COUNT: 12.1 % (ref 11–14.5)
GFR SERPL CREATININE-BSD FRML MDRD: >60 ML/MIN/1.73M2
GLUCOSE BLD-MCNC: 99 MG/DL (ref 70–125)
GLUCOSE UR STRIP-MCNC: NEGATIVE MG/DL
HCT VFR BLD AUTO: 44.3 % (ref 40–54)
HGB BLD-MCNC: 15.7 G/DL (ref 14–18)
HGB UR QL STRIP: NEGATIVE
INR PPP: 1.05 (ref 0.9–1.1)
KETONES UR STRIP-MCNC: NEGATIVE MG/DL
LEUKOCYTE ESTERASE UR QL STRIP: NEGATIVE
MCH RBC QN AUTO: 31.9 PG (ref 27–34)
MCHC RBC AUTO-ENTMCNC: 35.4 G/DL (ref 32–36)
MCV RBC AUTO: 90 FL (ref 80–100)
NITRATE UR QL: NEGATIVE
PH UR STRIP: 7.5 [PH] (ref 4.5–8)
PLATELET # BLD AUTO: 213 THOU/UL (ref 140–440)
PMV BLD AUTO: 9.3 FL (ref 8.5–12.5)
POTASSIUM BLD-SCNC: 4.7 MMOL/L (ref 3.5–5)
RBC # BLD AUTO: 4.92 MILL/UL (ref 4.4–6.2)
SODIUM SERPL-SCNC: 141 MMOL/L (ref 136–145)
SP GR UR STRIP: 1.01 (ref 1–1.03)
UROBILINOGEN UR STRIP-ACNC: NORMAL
WBC: 4.7 THOU/UL (ref 4–11)

## 2018-08-07 ENCOUNTER — OFFICE VISIT - HEALTHEAST (OUTPATIENT)
Dept: NEUROSURGERY | Facility: CLINIC | Age: 63
End: 2018-08-07

## 2018-08-07 DIAGNOSIS — Z01.818 PRE-OP EVALUATION: ICD-10-CM

## 2018-08-08 ENCOUNTER — ANESTHESIA - HEALTHEAST (OUTPATIENT)
Dept: SURGERY | Facility: CLINIC | Age: 63
End: 2018-08-08

## 2018-08-08 ENCOUNTER — TELEPHONE (OUTPATIENT)
Dept: INTERNAL MEDICINE | Facility: CLINIC | Age: 63
End: 2018-08-08

## 2018-08-08 NOTE — TELEPHONE ENCOUNTER
[facility] Call Center    Phone Message    May a detailed message be left on voicemail: yes    Reason for Call: Order(s): Other:   Reason for requested: MRI for Cerebrovascular System before pt can be seen in Neurosurgery   Date needed: ASAP   Provider name: Dr. Escalera       Action Taken: Message routed to:  [facility] ([facility] TALISHA

## 2018-08-08 NOTE — TELEPHONE ENCOUNTER
Patient seen by [facility] neurosurgery.  Was placed into neurosurgery work que by unknown source, but not initiated by patient.  Patient has had procedure with [facility] with notes in care everywhere.  No need for referral to P at this time.  Aruna Gill LPN

## 2018-08-09 ENCOUNTER — HOSPITAL ENCOUNTER (OUTPATIENT)
Dept: INTERVENTIONAL RADIOLOGY/VASCULAR | Facility: CLINIC | Age: 63
Discharge: HOME | End: 2018-08-09
Attending: NEUROLOGICAL SURGERY

## 2018-08-09 ENCOUNTER — SURGERY - HEALTHEAST (OUTPATIENT)
Dept: SURGERY | Facility: CLINIC | Age: 63
End: 2018-08-09

## 2018-08-09 DIAGNOSIS — R42 VERTIGO: ICD-10-CM

## 2018-08-09 DIAGNOSIS — I67.1 CEREBRAL ANEURYSM, NONRUPTURED: ICD-10-CM

## 2018-08-09 ASSESSMENT — MIFFLIN-ST. JEOR
SCORE: 1706.59
SCORE: 1686.46

## 2018-08-10 RX ORDER — PREDNISONE 20 MG/1
TABLET ORAL
OUTPATIENT
Start: 2018-08-10

## 2018-08-10 NOTE — TELEPHONE ENCOUNTER
I spoke to patient, he stated he does not need a refill of this medication. He reported he actually has some prednisone left, stopped taking it as he felt it was not needed.    Herminia John RN

## 2018-08-10 NOTE — TELEPHONE ENCOUNTER
predniSONE (DELTASONE) 20 MG tablet  Last Written Prescription Date:  7/9/18  Last Fill Quantity: 33,   # refills: 0  Last Office Visit : 7/24/18  Future Office visit: 10/29/18    Routing because:  Not on protocol

## 2018-08-11 ASSESSMENT — MIFFLIN-ST. JEOR: SCORE: 1695.7

## 2018-08-12 ASSESSMENT — MIFFLIN-ST. JEOR: SCORE: 1649.89

## 2018-08-13 ENCOUNTER — HOME CARE/HOSPICE - HEALTHEAST (OUTPATIENT)
Dept: HOME HEALTH SERVICES | Facility: HOME HEALTH | Age: 63
End: 2018-08-13

## 2018-08-13 ASSESSMENT — MIFFLIN-ST. JEOR: SCORE: 1653.52

## 2018-08-14 ENCOUNTER — COMMUNICATION - HEALTHEAST (OUTPATIENT)
Dept: NEUROSURGERY | Facility: CLINIC | Age: 63
End: 2018-08-14

## 2018-08-14 ENCOUNTER — HOME CARE/HOSPICE - HEALTHEAST (OUTPATIENT)
Dept: HOME HEALTH SERVICES | Facility: HOME HEALTH | Age: 63
End: 2018-08-14

## 2018-08-16 ENCOUNTER — OFFICE VISIT (OUTPATIENT)
Dept: INTERNAL MEDICINE | Facility: CLINIC | Age: 63
End: 2018-08-16
Payer: COMMERCIAL

## 2018-08-16 ENCOUNTER — PATIENT OUTREACH (OUTPATIENT)
Dept: CARE COORDINATION | Facility: CLINIC | Age: 63
End: 2018-08-16

## 2018-08-16 VITALS
HEART RATE: 79 BPM | SYSTOLIC BLOOD PRESSURE: 123 MMHG | WEIGHT: 183.7 LBS | DIASTOLIC BLOOD PRESSURE: 84 MMHG | RESPIRATION RATE: 24 BRPM | BODY MASS INDEX: 25.66 KG/M2

## 2018-08-16 DIAGNOSIS — M54.41 RIGHT-SIDED LOW BACK PAIN WITH RIGHT-SIDED SCIATICA, UNSPECIFIED CHRONICITY: Primary | ICD-10-CM

## 2018-08-16 RX ORDER — ACETAMINOPHEN 500 MG
500-1000 TABLET ORAL
COMMUNITY
End: 2021-10-19

## 2018-08-16 ASSESSMENT — PAIN SCALES - GENERAL: PAINLEVEL: MILD PAIN (2)

## 2018-08-16 NOTE — MR AVS SNAPSHOT
After Visit Summary   8/16/2018    Carlitos Hernandez    MRN: [id number]           Patient Information     Date Of Birth          1955        Visit Information        Provider Department      8/16/2018 2:45 PM Missy Gaspar APRN CNP [facility]        Care Instructions    Primary Care Center Medication Refill Request Information:  * Please contact your pharmacy regarding ANY request for medication refills.  ** [facility] Prescription Fax = [phone]  * Please allow 3 business days for routine medication refills.  * Please allow 5 business days for controlled substance medication refills.     Primary Care Center Test Result notification information:  *You will be notified with in 7-10 days of your appointment day regarding the results of your test.  If you are on MyChart you will be notified as soon as the provider has reviewed the results and signed off on them.    [facility]: [phone]           Follow-ups after your visit        Your next 10 appointments already scheduled     Oct 29, 2018  9:40 AM CDT   (Arrive by 9:25 AM)   Return Visit with Ovidio Franco MD   [facility] ([facility])    [address] [place] [id number] [phone]              Who to contact     Please call your clinic at [phone] to:    Ask questions about your health    Make or cancel appointments    Discuss your medicines    Learn about your test results    Speak to your doctor            Additional Information About Your Visit        MyChart Information     The Idealistshart is an electronic gateway that provides easy, online access to your medical records. With Diagnosoftt, you can request a clinic appointment, read your test results, renew a prescription or communicate with your care team.     To sign up for The Idealistshart visit the website at [email or web address]   You will be asked to enter the access code listed below, as  well as some personal information. Please follow the directions to create your username and password.     Your access code is: [email or web address]  Expires: 10/7/2018  6:31 AM     Your access code will  in 90 days. If you need help or a new code, please contact your [facility] or call [phone] for assistance.        Care EveryWhere ID     This is your Care EveryWhere ID. This could be used by other organizations to access your [place] medical records  [id number]        Your Vitals Were     Pulse Respirations BMI (Body Mass Index)             79 24 25.66 kg/m2          Blood Pressure from Last 3 Encounters:   18 123/84   18 147/86   18 135/86    Weight from Last 3 Encounters:   18 83.3 kg (183 lb 11.2 oz)   18 88 kg (193 lb 14.4 oz)   18 89.8 kg (198 lb)              Today, you had the following     No orders found for display       Primary Care Provider Office Phone # Fax #    Ovidio Franco [phone]       [address] [facility] [address]  [facility] [address]        Equal Access to Services     DIAZ ABEL AH: Hadii oscar ocampoo Soseun, waaxda luqadaha, qaybta kaalmada adeegyada, tin brooks. So [place] [phone].    ATENCIÓN: Si habla español, tiene a desai disposición servicios gratuitos de asistencia lingüística. Leah al [phone].    We comply with applicable federal civil rights laws and [place] laws. We do not discriminate on the basis of race, color, national origin, age, disability, sex, sexual orientation, or gender identity.            Thank you!     Thank you for choosing [facility]  for your care. Our goal is always to provide you with excellent care. Hearing back from our patients is one way we can continue to improve our services. Please take a few minutes to complete the written survey that you may receive in the mail after your visit with us. Thank you!              Your Updated Medication List - Protect others around you: Learn how to safely use, store and throw away your medicines at www.disposemymeds.org.          This list is accurate as of 8/16/18  3:51 PM.  Always use your most recent med list.                   Brand Name Dispense Instructions for use Diagnosis    AMOXICILLIN PO      Take 1 tablet by mouth 2 times daily        atorvastatin 10 MG tablet    LIPITOR    100 tablet    Take 1 tablet (10 mg) by mouth daily    Hyperlipidemia, unspecified hyperlipidemia type       EPINEPHrine 0.3 MG/0.3ML injection 2-pack    EPIPEN/ADRENACLICK/or ANY BX GENERIC EQUIV    0.6 mL    Inject 0.3 mLs (0.3 mg) into the muscle once as needed    Hymenoptera allergy       KEPPRA PO      Take 1 tablet by mouth 2 times daily        naproxen 500 MG tablet    NAPROSYN    100 tablet    Take 1 tablet (500 mg) by mouth 2 times daily (with meals)    It band syndrome, right       TYLENOL 500 MG tablet   Generic drug:  acetaminophen      Take 500-1,000 mg by mouth 2 times daily Takes tablets bid.Sabrina Crow LPN 3:10 PM on 8/16/2018

## 2018-08-16 NOTE — NURSING NOTE
Chief Complaint   Patient presents with     Surgical Followup     post cariotomy (clipped and artery) 8/9/18   Sabrina Crow LPN 3:13 PM on 8/16/2018  Has not been checked for HIV.Sabrina Crow LPN 3:15 PM on 8/16/2018  Will do Health Maintenance at a future appointment.Sabrina Crow LPN 3:16 PM on 8/16/2018

## 2018-08-16 NOTE — PROGRESS NOTES
[facility] [place]   Missy UREÑAHiginio ChasityCHERYL CNP  08/16/2018      Chief Complaint:   Surgical Followup     History of Present Illness:   Carlitos Hernandez is a 63 year old male with a history of hyperlipidemia and hypertension who presents with his wife for post operative follow up. On 07/04, the patient experienced severe nausea and vertigo and was seen in the [facility] ER 2 days later where an MRI was obtained revealing an aneurysm.  On 08/09, the patient underwent right craniotomy and clipping or right carotid terminus aneurysm by Dr. Muro without complication and was admitted at [facility] for postoperative pain and constipation. He was discharged with a weeks supply of Amoxicillin for a left lower lobe infiltrate seen on chest X-ray which was nearly resolved on the 12th.. The patient was re-intubated during his post operative stay. He was started on Keppra for seizures and is scheduled for a follow up with neurology next week. He has lost 10 lbs since his last visit. When asked how he feels, the patient states that his hearing is not quite right. He also notes that he is less interested in things around him and that some foods taste weird. He has been sleeping okay and eating well.     Low Back Pain:  The patient states that he has noticed lower back pain a few days following his operation. His operation was 3-4 hours. The pain does not wake him up at night. Per wife, the neurosurgeon stated there is a possibility that the patient suffered a seizure during the operation and moved. His wife states that it is uncommon for him to complain of pain. When sitting on the exam table he rates the pain as a 1 and in the car it was a 3. The pain occasionally radiates down his left leg. He has treated similar pain in the past with stretching. His pain is tolerable with 2000 mg of Tylenol per day. He does not sleep on his stomach.      Review of Systems:   Pertinent items are noted in HPI, remainder of complete  ROS is negative.      Active Medications:     Current Outpatient Prescriptions:      acetaminophen (TYLENOL) 500 MG tablet, Take 500-1,000 mg by mouth 2 times daily Takes tablets bid.Sabrina Crow LPN 3:10 PM on 8/16/2018, Disp: , Rfl:      AMOXICILLIN PO, Take 1 tablet by mouth 2 times daily, Disp: , Rfl:      LevETIRAcetam (KEPPRA PO), Take 1 tablet by mouth 2 times daily, Disp: , Rfl:      atorvastatin (LIPITOR) 10 MG tablet, Take 1 tablet (10 mg) by mouth daily, Disp: 100 tablet, Rfl: 3     EPINEPHrine (EPIPEN/ADRENACLICK/OR ANY BX GENERIC EQUIV) 0.3 MG/0.3ML injection 2-pack, Inject 0.3 mLs (0.3 mg) into the muscle once as needed, Disp: 0.6 mL, Rfl: 3     naproxen (NAPROSYN) 500 MG tablet, Take 1 tablet (500 mg) by mouth 2 times daily (with meals) (Patient not taking: Reported on 8/16/2018), Disp: 100 tablet, Rfl: 1      Allergies:   Wasps [hornets]      Past Medical History:  Hyperlipemia  It band syndrome, right  Labile hypertension  Plantar fasciitis    Past Surgical History:  Thyroglossal duct cystectomy  Tonsillectomy  Vasectomy    Family History:   Coronary artery disease - maternal grandmother  Liver cancer - father  Hypertension - mother  Asthma - sister     Immunizations:  Immunization History   Administered Date(s) Administered     Influenza (IIV3) PF 11/23/2016     TDAP Vaccine (Boostrix) 02/19/2018     Social History:   Smoking status: never smoker  Smokeless tobacco: never used  Alcohol use: 7 standard drinks per week. None currently.     Physical Exam:   /84 (BP Location: Right arm, Patient Position: Sitting, Cuff Size: Adult Regular)  Pulse 79  Resp 24  Wt 83.3 kg (183 lb 11.2 oz)  BMI 25.66 kg/m2     Wt Readings from Last 1 Encounters:   08/16/18 83.3 kg (183 lb 11.2 oz)     Constitutional: no distress, comfortable, pleasant   Eyes: anicteric   Cardiovascular: regular rate and rhythm, normal S1 and S2, no murmurs, rubs or gallops, peripheral pulses full and symmetric   Respiratory:  clear to auscultation, no wheezes or crackles, normal breath sounds   Musculoskeletal: tenderness with palpation of the 5th L vertebra.  Skin: right cranial staples appear clean and dry without redness or inflammation  Neurological:normal speech, no tremor. A and O x 3, good historian. Sensation to light touch intact.   DTRs : Patellar 1/4, achilles 1/4, toes down going.  Psychological: appropriate mood, good eye contact, normal affect     Assessment and Plan:  Post-op hospital discharge follow-up.    Low Back Pain  I suspect this is related to his prolonged bed rest after laying on a surgical table for surgery. Walking as tolerated.  If no improvement in a week or so will recommend PT. The patient is to continue with Tylenol at home and he has an appointment with neurosurgery next week.        Scribe Disclosure:   I, Dick Yuen, am serving as a scribe to document services personally performed by CHERYL Ruiz CNP at this visit, based upon the provider's statements to me. All documentation has been reviewed by the aforementioned provider prior to being entered into the official medical record.     Portions of this medical record were completed by a scribe. UPON MY REVIEW AND AUTHENTICATION BY ELECTRONIC SIGNATURE, this confirms (a) I performed the applicable clinical services, and (b) the record is accurate.       Answers for HPI/ROS submitted by the patient on 8/16/2018   PHQ-2 Score: 0  Total time spent 25 minutes.  More than 50% of the time spent with Mr. Hernandez on counseling / coordinating his care    Missy LUNA CNP

## 2018-08-16 NOTE — PATIENT INSTRUCTIONS
[facility] Medication Refill Request Information:  * Please contact your pharmacy regarding ANY request for medication refills.  ** [facility] Prescription Fax = [phone]  * Please allow 3 business days for routine medication refills.  * Please allow 5 business days for controlled substance medication refills.     [facility] Test Result notification information:  *You will be notified with in 7-10 days of your appointment day regarding the results of your test.  If you are on MyChart you will be notified as soon as the provider has reviewed the results and signed off on them.    [facility]: [phone]

## 2018-08-17 ENCOUNTER — COMMUNICATION - HEALTHEAST (OUTPATIENT)
Dept: NEUROSURGERY | Facility: CLINIC | Age: 63
End: 2018-08-17

## 2018-08-20 ENCOUNTER — COMMUNICATION - HEALTHEAST (OUTPATIENT)
Dept: NEUROSURGERY | Facility: CLINIC | Age: 63
End: 2018-08-20

## 2018-08-22 ENCOUNTER — HOME CARE/HOSPICE - HEALTHEAST (OUTPATIENT)
Dept: HOME HEALTH SERVICES | Facility: HOME HEALTH | Age: 63
End: 2018-08-22

## 2018-08-23 ENCOUNTER — AMBULATORY - HEALTHEAST (OUTPATIENT)
Dept: NEUROSURGERY | Facility: CLINIC | Age: 63
End: 2018-08-23

## 2018-08-30 ENCOUNTER — RECORDS - HEALTHEAST (OUTPATIENT)
Dept: LAB | Facility: HOSPITAL | Age: 63
End: 2018-08-30

## 2018-08-30 LAB
ALBUMIN SERPL-MCNC: 3.9 G/DL (ref 3.5–5)
ALP SERPL-CCNC: 79 U/L (ref 45–120)
ALT SERPL W P-5'-P-CCNC: 20 U/L (ref 0–45)
ANION GAP SERPL CALCULATED.3IONS-SCNC: 6 MMOL/L (ref 5–18)
AST SERPL W P-5'-P-CCNC: 17 U/L (ref 0–40)
BILIRUB SERPL-MCNC: 0.6 MG/DL (ref 0–1)
BUN SERPL-MCNC: 19 MG/DL (ref 8–22)
CALCIUM SERPL-MCNC: 9.3 MG/DL (ref 8.5–10.5)
CHLORIDE BLD-SCNC: 106 MMOL/L (ref 98–107)
CK SERPL-CCNC: 69 U/L (ref 30–190)
CO2 SERPL-SCNC: 29 MMOL/L (ref 22–31)
CREAT SERPL-MCNC: 1.06 MG/DL (ref 0.7–1.3)
GFR SERPL CREATININE-BSD FRML MDRD: >60 ML/MIN/1.73M2
GLUCOSE BLD-MCNC: 102 MG/DL (ref 70–125)
LEVETIRACETAM (KEPPRA): 8.3 UG/ML (ref 6–46)
POTASSIUM BLD-SCNC: 4.3 MMOL/L (ref 3.5–5)
PROT SERPL-MCNC: 6.7 G/DL (ref 6–8)
SODIUM SERPL-SCNC: 141 MMOL/L (ref 136–145)

## 2018-09-06 ENCOUNTER — COMMUNICATION - HEALTHEAST (OUTPATIENT)
Dept: NEUROSURGERY | Facility: CLINIC | Age: 63
End: 2018-09-06

## 2018-09-20 ENCOUNTER — OFFICE VISIT - HEALTHEAST (OUTPATIENT)
Dept: NEUROSURGERY | Facility: CLINIC | Age: 63
End: 2018-09-20

## 2018-09-20 DIAGNOSIS — R56.9 SEIZURES (H): ICD-10-CM

## 2018-09-20 ASSESSMENT — MIFFLIN-ST. JEOR: SCORE: 1652.15

## 2018-10-29 ENCOUNTER — OFFICE VISIT (OUTPATIENT)
Dept: INTERNAL MEDICINE | Facility: CLINIC | Age: 63
End: 2018-10-29
Payer: COMMERCIAL

## 2018-10-29 VITALS
DIASTOLIC BLOOD PRESSURE: 89 MMHG | HEART RATE: 63 BPM | OXYGEN SATURATION: 96 % | BODY MASS INDEX: 27.74 KG/M2 | SYSTOLIC BLOOD PRESSURE: 138 MMHG | WEIGHT: 198.6 LBS

## 2018-10-29 DIAGNOSIS — I10 BENIGN ESSENTIAL HYPERTENSION: Primary | ICD-10-CM

## 2018-10-29 ASSESSMENT — PAIN SCALES - GENERAL: PAINLEVEL: NO PAIN (0)

## 2018-10-29 NOTE — NURSING NOTE
Chief Complaint   Patient presents with     Recheck Medication     follow up on BP       JANET Holden 9:34 AM on 10/29/2018.

## 2018-10-29 NOTE — MR AVS SNAPSHOT
After Visit Summary   10/29/2018    Carlitos Hernandez    MRN: [id number]           Patient Information     Date Of Birth          1955        Visit Information        Provider Department      10/29/2018 9:40 AM Ovidio Franco MD [facility]        Today's Diagnoses     Benign essential hypertension    -  1       Follow-ups after your visit        Who to contact     Please call your clinic at [phone] to:    Ask questions about your health    Make or cancel appointments    Discuss your medicines    Learn about your test results    Speak to your doctor            Additional Information About Your Visit        MyChart Information     NanoICE is an electronic gateway that provides easy, online access to your medical records. With NanoICE, you can request a clinic appointment, read your test results, renew a prescription or communicate with your care team.     To sign up for [employer] visit the website at [email or web address]   You will be asked to enter the access code listed below, as well as some personal information. Please follow the directions to create your username and password.     Your access code is: [email or web address]  Expires: 2019  6:30 AM     Your access code will  in 90 days. If you need help or a new code, please contact your [facility] or call [phone] for assistance.        Care EveryWhere ID     This is your Care EveryWhere ID. This could be used by other organizations to access your [place] medical records  [id number]        Your Vitals Were     Pulse Pulse Oximetry BMI (Body Mass Index)             63 96% 27.74 kg/m2          Blood Pressure from Last 3 Encounters:   10/29/18 138/89   18 123/84   18 147/86    Weight from Last 3 Encounters:   10/29/18 90.1 kg (198 lb 9.6 oz)   18 83.3 kg (183 lb 11.2 oz)   18 88 kg (193 lb 14.4 oz)              We Performed the Following     ADMIN  MEDICARE: Pneumococcal Vaccine ()     Pneumococcal vaccine 13 valent PCV13 IM (Prevnar) [49558]          Today's Medication Changes          These changes are accurate as of 10/29/18 10:31 AM.  If you have any questions, ask your nurse or doctor.               Stop taking these medicines if you haven't already. Please contact your care team if you have questions.     AMOXICILLIN PO   Stopped by:  Ovidio Fracno MD                    Primary Care Provider Office Phone # Fax #    Ovidio Franco [phone]       [address] [place] [id number]        Equal Access to Services     [facility]: Hadii aad ku hadasho Soomaali, waaxda luqadaha, qaybta kaalmada adeegyada, tin ureña . So [facility] [phone].    ATENCIÓN: Si habla español, tiene a desai disposición servicios gratuitos de asistencia lingüística. [facility] [phone].    We comply with applicable federal civil rights laws and [place] laws. We do not discriminate on the basis of race, color, national origin, age, disability, sex, sexual orientation, or gender identity.            Thank you!     Thank you for choosing [facility]  for your care. Our goal is always to provide you with excellent care. Hearing back from our patients is one way we can continue to improve our services. Please take a few minutes to complete the written survey that you may receive in the mail after your visit with us. Thank you!             Your Updated Medication List - Protect others around you: Learn how to safely use, store and throw away your medicines at www.disposemymeds.org.          This list is accurate as of 10/29/18 10:31 AM.  Always use your most recent med list.                   Brand Name Dispense Instructions for use Diagnosis    atorvastatin 10 MG tablet    LIPITOR    100 tablet    Take 1 tablet (10 mg) by mouth daily    Hyperlipidemia, unspecified hyperlipidemia  type       EPINEPHrine 0.3 MG/0.3ML injection 2-pack    EPIPEN/ADRENACLICK/or ANY BX GENERIC EQUIV    0.6 mL    Inject 0.3 mLs (0.3 mg) into the muscle once as needed    Hymenoptera allergy       KEPPRA PO      Take 1 tablet by mouth 2 times daily        naproxen 500 MG tablet    NAPROSYN    100 tablet    Take 1 tablet (500 mg) by mouth 2 times daily (with meals)    It band syndrome, right       TYLENOL 500 MG tablet   Generic drug:  acetaminophen      Take 500-1,000 mg by mouth 2 times daily Takes tablets bid.Sabrina Crow LPN 3:10 PM on 8/16/2018

## 2018-10-29 NOTE — PROGRESS NOTES
HPI  63-year-old presents today for review of his blood pressure.  This past summer he is found to have a carotid complex aneurysm and he underwent a right craniotomy for clipping of this.  This was complicated by postoperative seizure.  Is been maintained on Keppra since that time he is tolerated this poorly feeling fatigued and a little fuzzy in the head.  Recently had a sleep deprived EEG however the report of this is not available.  Intention is to take him off the Keppra if this is satisfactory.  He has been monitoring his blood pressure periodically and he continues to run in the 130s over 80s much of the time.  We discussed new blood pressure guidelines but he is not motivated to take medication at this time.  He is already exercising significantly he walks 4 miles with the dogs briskly every day and then he goes to the gym where he alternates strength training with high intensity cardiovascular training.  He is getting heart rates in the 150s-160s with these workouts.  He otherwise has been feeling well no chest pain or dyspnea in association with this.  He drinks beer on a daily basis he is avoiding salt and he is eating lots of fruits and vegetables by his report.  Past Medical History:   Diagnosis Date     Hyperlipemia      It band syndrome, right 11/23/2016     Labile hypertension      Plantar fasciitis 2/19/2018     Past Surgical History:   Procedure Laterality Date     rt craaniotomy for anerysm clipping  08/2018     thyroglossal duct cystectomy       TONSILLECTOMY       VASECTOMY       Family History   Problem Relation Age of Onset     Coronary Artery Disease Maternal Grandmother      Other Cancer Father      liver     Hypertension Mother      Asthma Sister      Social History     Social History     Marital status:      Spouse name: N/A     Number of children: N/A     Years of education: N/A     Social History Main Topics     Smoking status: Never Smoker     Smokeless tobacco: Never Used      Alcohol use 4.2 oz/week     7 Standard drinks or equivalent per week     Drug use: No     Sexual activity: Not Asked     Other Topics Concern     None     Social History Narrative       Exam:  /89 (BP Location: Right arm, Patient Position: Sitting, Cuff Size: Adult Large)  Pulse 63  Wt 90.1 kg (198 lb 9.6 oz)  SpO2 96%  BMI 27.74 kg/m2  198 lbs 9.6 oz  The patient is alert, oriented with a clear sensorium.   Skin shows healed scaled lesions on rt leg and arm no rashes and good turgor.   Head is normocephalic and atraumatic.    Neck shows no nodes, thyromegaly.     Lungs are clear.   Heart shows normal S1 and S2 without murmur or gallop.    Extremities show no edema.    ASSESSMENT  1 hypertension marginally controlled  2 status post carotid aneurysm clipping  3 history seizure following above on Keppra  4 fatigue related to Keppra    Plan  Reviewed and intensify his nonpharmacologic blood pressure control measures including avoiding the daily alcohol continuing salt restriction high intensity interval training and increasing the plans in his diet.  To monitor the blood pressure at home and will review this again next year  Over 25 minutes spent with patient the majority in counseling and coordinating care.    This note was completed using Dragon voice recognition software.  Although reviewed after completion, some word and grammatical errors may occur.    Ovidio Franco MD  General Internal Medicine  Primary Care Center  [phone]

## 2019-01-17 ENCOUNTER — MYC MEDICAL ADVICE (OUTPATIENT)
Dept: INTERNAL MEDICINE | Facility: CLINIC | Age: 64
End: 2019-01-17

## 2019-02-13 ENCOUNTER — ALLIED HEALTH/NURSE VISIT (OUTPATIENT)
Dept: NEUROLOGY | Facility: CLINIC | Age: 64
End: 2019-02-13
Payer: COMMERCIAL

## 2019-02-13 ENCOUNTER — OFFICE VISIT (OUTPATIENT)
Dept: NEUROLOGY | Facility: CLINIC | Age: 64
End: 2019-02-13
Payer: COMMERCIAL

## 2019-02-13 VITALS
RESPIRATION RATE: 16 BRPM | BODY MASS INDEX: 27.6 KG/M2 | WEIGHT: 197.6 LBS | DIASTOLIC BLOOD PRESSURE: 90 MMHG | SYSTOLIC BLOOD PRESSURE: 132 MMHG

## 2019-02-13 DIAGNOSIS — R56.9 OBSERVED SEIZURE-LIKE ACTIVITY (H): ICD-10-CM

## 2019-02-13 DIAGNOSIS — G92.9 TOXIC ENCEPHALOPATHY: Primary | ICD-10-CM

## 2019-02-13 DIAGNOSIS — I72.9 ANEURYSM (H): Primary | ICD-10-CM

## 2019-02-13 RX ORDER — SILDENAFIL 100 MG/1
TABLET, FILM COATED ORAL
COMMUNITY
Start: 2018-08-11 | End: 2022-04-08

## 2019-02-13 ASSESSMENT — PAIN SCALES - GENERAL: PAINLEVEL: NO PAIN (0)

## 2019-02-13 NOTE — LETTER
2019       RE: Carlitos Hernandez  : 1955   MRN: [id number]      Dear Colleague,    Thank you for referring your patient, Carlitos Hernandez, to the [facility] EPILEPSY CARE at [facility]. Please see a copy of my visit note below.      [facility]  Epilepsy Clinic:  NEW PATIENT EVALUATION          Service Date: 2019      HISTORY:  Mr. Carlitos Hernandez is a 63 year old male with a PMH significant for hyperlipidemia, hypertension, and right ICA aneurysm s/p clipping who presents for second opinion regarding seizures. He has had one episode, which occurred immediately postoperatively from his right ICA aneurysm clipping. Semiology below. He'd initially presented to the emergency department on 2018 with dizziness and imbalance. Brain MRI showed incidental 8 mm right ICA aneurysm. The clipping was done electively in 2018.   Reportedly there was concern that he could have had seizures intraoperatively though this is entirely unclear as he was paralyzed. EEG/SSEP stable throughout case per notes. He was loaded with Keppra and started on maintenance.     He reports being discharged on 500 mg BID but discharge summary indicated 1000 mg BID. He has since been tapering is Keppra due to side effects. He reports that he'd gotten down to 250 mg BID with Dr. Givens's permission, but decreased to 125-250 mg after his second EEG in December. He has been experiencing significant dizziness which he describes as imbalance, not vertigo. This has gotten somewhat better with tapering the Keppra. He has also noticed some problems with his thinking, specifically in trying to visualize things. He has some slowed speech. Possibly some irritability though he says this is minimal.     He has no notable history of head trauma. No childhood seizures. No CNS infections. The dizziness that he'd initially presented with on 2018 had improved significantly prior to the surgery, though he is unsure whether it  completely resolved. He denies depression, falls, shortness of breath, fever, N/V.    Semiology:  Type I:   Patient had eye and face twitching postoperatively during his admission for right ICA aneurysm clipping. Most notes do not mention laterality, though on RN note indicates that the twitching was LEFT sided. There was a reported period of unresponsiveness, though unclear how awake he'd previously been following the sedation. He was quickly reintubated.     The patient's medical, surgical, social, and family history were personally reviewed with the patient.  Past Medical History:   Diagnosis Date     Hyperlipemia      It band syndrome, right 11/23/2016     Labile hypertension      Plantar fasciitis 2/19/2018      Past Surgical History:   Procedure Laterality Date     rt craniotomy for aneurysm clipping  08/2018     thyroglossal duct cystectomy       TONSILLECTOMY       VASECTOMY       Social History:  Lives with wife and 3 kids  Not currently working  Previously served as professor at [employer]  Alcohol 3 drinks per week  No tobacco or illicit drugs    Family History:  Father with cancer, liver  Father with 2 lifetime seizures, unclear what type    Current Outpatient Medications   Medication     atorvastatin (LIPITOR) 10 MG tablet     EPINEPHrine (EPIPEN/ADRENACLICK/OR ANY BX GENERIC EQUIV) 0.3 MG/0.3ML injection 2-pack     LevETIRAcetam (KEPPRA PO)     sildenafil (VIAGRA) 100 MG tablet     acetaminophen (TYLENOL) 500 MG tablet     naproxen (NAPROSYN) 500 MG tablet     No current facility-administered medications for this visit.      Allergies   Allergen Reactions     Wasps [Hornets] Other (See Comments) and Hives     Decreased heart rate.      Pollen Extract        Review of Systems:  Negative except noted in HPI.    Physical Exam:  /90   Resp 16   Wt 197 lb 9.6 oz (89.6 kg)   BMI 27.60 kg/m     General:  No acute distress, seated comfortably.   Neurologic:    Mental Status Exam:  Alert, awake,  oriented. No dysarthria, speech is fluent.  Cranial Nerves:  PERRLA, EOMI, no nystagmus, facial movements symmetric, facial sensation intact to light touch, hearing intact to conversation, palate and uvula rise symmetrically, no deviation in uvula or tongue, trapezius and SCMs 5/5 bilaterally, tongue midline and fully mobile. No atrophy or fasciculations.   Motor:  Normal tone in all four extremities, no atrophy or fasciculations. 5/5 strength bilaterally in shoulder abduction, elbow extensors and flexors, , hip flexors, knee extensors and flexors. No tremors.  Sensory:  Sensation intact to light touch on arms and legs bilaterally. Negative Romberg.  Coordination:  Finger-nose-finger intact bilaterally. Finger tapping regular and rhythmic bilaterally. .  Reflexes:  2+ and symmetric in triceps, biceps, brachioradialis, patellar, Achilles, and plantars downgoing bilaterally  Gait:  Normal gait; normal arm swing and stance. Normal toe-walking.  Head:  No obvious signs of traumata, scalp non-tender  Eyes:  No conjunctival injection, no scleral icterus. Fundoscopic exam WNL.  Mouth:  No oral lesions, no erythema or exudate in the oropharynx  Respiratory:  Non-labored breathing.  Cardiovascular:  Peripheral pulses intact. No carotid bruits.  Extremities:  Warm, dry without peripheral edema.  Psych: Appropriate mood & affect. Non-pressured speech.    Laboratory:  [place] 8/30/18: 8.3    EEG 8/10/18:  Impression:  Abnormal continuous EEG recording due to focal delta   slowing noted intermittently in the right temporal region and suppression   of faster activity over the right hemisphere.  There is also the presence   of TIRDA.  The presence of the intermittent focal delta slowing could be seen with a   structural abnormality in that region.  The suppression of faster activity   may be seen with a process such as a subdural hematoma.  TIRDA is seen as   a marker for seizures.    EEG 8/11/2018:  Impression: This is an  abnormal EEG due to asymmetric slowing of the   background and delta range maximally expressed in the right hemisphere.    This delta activity at time assumes a rhythmic character that raises the   possibility of nonconvulsive seizure.  This focal slowing in the right   hemisphere could be due to the recent aneurysm surgery, clinical   correlation is recommended.  Underlying background is slow suggesting   nonspecific cerebral dysfunction.    Two EEGs outpatient   October 31, 2018   Intermittent right anterior quadrant theta slowing and right mid temporal spikes    December 20, 2018    intermittent theta slowing over right temporal region with intermittent right mid temporal sharp waves     Imaging:  Brain MR 7/6/18:  CONCLUSION:  HEAD MRI:  1.  No acute intracranial finding. No evidence for recent ischemia, intracranial hemorrhage, or mass.  HEAD MRA:  1.  8 mm superiorly projecting right carotid terminus aneurysm. Follow-up referral to neurointerventional radiology or vascular neurosurgery recommended.  2.  Otherwise negative brain MRA.    Head CT 8/10/2018  1.  Postsurgical changes related to right ICA aneurysm clipping as described.  2.  Diminished gas within the subdural space with slight interval reaccumulation of low-density subdural fluid.  3.  No new hemorrhage or CT evidence for acute infarct.    Assessment/Plan:  Carlitos Hernandez is a 63 year old male with a PMH significant for hyperlipidemia, hypertension, and right ICA aneurysm s/p clipping who presents for second opinion regarding seizures. The description of the event does raise concern for a partial seizure in the immediate post-operative period in August 2018, possibly as an acute symptomatic seizure not representing a spontaneous seizure in epilepsy. Outside EEG reports indicated focal spikes or sharp waves. It is possible that breach artifact has confounded the apparent spikes, however.     We obtained a 3-hour VEEG today.  This did not show abnormal  interictal epileptiform activity, but did show a right frontotemporal breach effect (consistent with craniotomy defect) and associated focal wicket waveforms.    We told the patient that we did not have evidence of ongoing elevated risk of partial seizures.  While it is reasonable to continue tapering fully off levetiracetam as he has been pursing with Dr. Givens, we also discussed that one can never fully guarantee that an epileptic seizure might occur in the future, no matter the findings on interictal EEG.  He indicated that he is willing to have a small risk of seizure recurrence, in order to avoid adverse effects of levetiracetam or possible adverse effects of any other AED.  We suggested that he should review his desire to complete the taper off levetiracetam with Dr. Givens, and if there is any question of seizures in the future he should contact Dr. Givens or me immediately, and should cease driving or exposure to activities with risk of injury if any future event involves impaired consciousness or falling.    Patient seen and discussed with attending, Dr. Julian Barajas MD  Neurology PGY3    Report Prepared By: Tim Barajas MD, Neurology Resident   I agree with the findings and plan of care as documented.  I personally examined the patient, and discussed our epilepsy diagnostic impressions and EEG findings with the patient.  He was agreeable to this plan.       I spent 65 minutes in this patient care, more than 50% of which consisted of counseling and coordinating care.          Dick Jordan M.D.   Professor of Neurology

## 2019-02-13 NOTE — PROGRESS NOTES
[facility]  Epilepsy Clinic:  NEW PATIENT EVALUATION          Service Date: 02/13/2019      HISTORY:  Mr. Carlitos Hernandez is a 63 year old male with a PMH significant for hyperlipidemia, hypertension, and right ICA aneurysm s/p clipping who presents for second opinion regarding seizures. He has had one episode, which occurred immediately postoperatively from his right ICA aneurysm clipping. Semiology below. He'd initially presented to the emergency department on 7/6/2018 with dizziness and imbalance. Brain MRI showed incidental 8 mm right ICA aneurysm. The clipping was done electively in August 2018.   Reportedly there was concern that he could have had seizures intraoperatively though this is entirely unclear as he was paralyzed. EEG/SSEP stable throughout case per notes. He was loaded with Keppra and started on maintenance.     He reports being discharged on 500 mg BID but discharge summary indicated 1000 mg BID. He has since been tapering is Keppra due to side effects. He reports that he'd gotten down to 250 mg BID with Dr. Givens's permission, but decreased to 125-250 mg after his second EEG in December. He has been experiencing significant dizziness which he describes as imbalance, not vertigo. This has gotten somewhat better with tapering the Keppra. He has also noticed some problems with his thinking, specifically in trying to visualize things. He has some slowed speech. Possibly some irritability though he says this is minimal.     He has no notable history of head trauma. No childhood seizures. No CNS infections. The dizziness that he'd initially presented with on 7/6/2018 had improved significantly prior to the surgery, though he is unsure whether it completely resolved. He denies depression, falls, shortness of breath, fever, N/V.    Semiology:  Type I:   Patient had eye and face twitching postoperatively during his admission for right ICA aneurysm clipping. Most notes do not mention laterality, though on RN  note indicates that the twitching was LEFT sided. There was a reported period of unresponsiveness, though unclear how awake he'd previously been following the sedation. He was quickly reintubated.     The patient's medical, surgical, social, and family history were personally reviewed with the patient.  Past Medical History:   Diagnosis Date     Hyperlipemia      It band syndrome, right 11/23/2016     Labile hypertension      Plantar fasciitis 2/19/2018      Past Surgical History:   Procedure Laterality Date     rt craniotomy for aneurysm clipping  08/2018     thyroglossal duct cystectomy       TONSILLECTOMY       VASECTOMY       Social History:  Lives with wife and 3 kids  Not currently working  Previously served as professor at [employer]  Alcohol 3 drinks per week  No tobacco or illicit drugs    Family History:  Father with cancer, liver  Father with 2 lifetime seizures, unclear what type    Current Outpatient Medications   Medication     atorvastatin (LIPITOR) 10 MG tablet     EPINEPHrine (EPIPEN/ADRENACLICK/OR ANY BX GENERIC EQUIV) 0.3 MG/0.3ML injection 2-pack     LevETIRAcetam (KEPPRA PO)     sildenafil (VIAGRA) 100 MG tablet     acetaminophen (TYLENOL) 500 MG tablet     naproxen (NAPROSYN) 500 MG tablet     No current facility-administered medications for this visit.      Allergies   Allergen Reactions     Wasps [Hornets] Other (See Comments) and Hives     Decreased heart rate.      Pollen Extract        Review of Systems:  Negative except noted in HPI.    Physical Exam:  /90   Resp 16   Wt 197 lb 9.6 oz (89.6 kg)   BMI 27.60 kg/m     General:  No acute distress, seated comfortably.   Neurologic:    Mental Status Exam:  Alert, awake, oriented. No dysarthria, speech is fluent.  Cranial Nerves:  PERRLA, EOMI, no nystagmus, facial movements symmetric, facial sensation intact to light touch, hearing intact to conversation, palate and uvula rise symmetrically, no deviation in uvula or tongue,  trapezius and SCMs 5/5 bilaterally, tongue midline and fully mobile. No atrophy or fasciculations.   Motor:  Normal tone in all four extremities, no atrophy or fasciculations. 5/5 strength bilaterally in shoulder abduction, elbow extensors and flexors, , hip flexors, knee extensors and flexors. No tremors.  Sensory:  Sensation intact to light touch on arms and legs bilaterally. Negative Romberg.  Coordination:  Finger-nose-finger intact bilaterally. Finger tapping regular and rhythmic bilaterally. .  Reflexes:  2+ and symmetric in triceps, biceps, brachioradialis, patellar, Achilles, and plantars downgoing bilaterally  Gait:  Normal gait; normal arm swing and stance. Normal toe-walking.  Head:  No obvious signs of traumata, scalp non-tender  Eyes:  No conjunctival injection, no scleral icterus. Fundoscopic exam WNL.  Mouth:  No oral lesions, no erythema or exudate in the oropharynx  Respiratory:  Non-labored breathing.  Cardiovascular:  Peripheral pulses intact. No carotid bruits.  Extremities:  Warm, dry without peripheral edema.  Psych: Appropriate mood & affect. Non-pressured speech.    Laboratory:  Kera 8/30/18: 8.3    EEG 8/10/18:  Impression:  Abnormal continuous EEG recording due to focal delta   slowing noted intermittently in the right temporal region and suppression   of faster activity over the right hemisphere.  There is also the presence   of TIRDA.  The presence of the intermittent focal delta slowing could be seen with a   structural abnormality in that region.  The suppression of faster activity   may be seen with a process such as a subdural hematoma.  TIRDA is seen as   a marker for seizures.    EEG 8/11/2018:  Impression: This is an abnormal EEG due to asymmetric slowing of the   background and delta range maximally expressed in the right hemisphere.    This delta activity at time assumes a rhythmic character that raises the   possibility of nonconvulsive seizure.  This focal slowing in the  right   hemisphere could be due to the recent aneurysm surgery, clinical   correlation is recommended.  Underlying background is slow suggesting   nonspecific cerebral dysfunction.    Two EEGs outpatient   October 31, 2018   Intermittent right anterior quadrant theta slowing and right mid temporal spikes    December 20, 2018    intermittent theta slowing over right temporal region with intermittent right mid temporal sharp waves     Imaging:  Brain MR 7/6/18:  CONCLUSION:  HEAD MRI:  1.  No acute intracranial finding. No evidence for recent ischemia, intracranial hemorrhage, or mass.  HEAD MRA:  1.  8 mm superiorly projecting right carotid terminus aneurysm. Follow-up referral to neurointerventional radiology or vascular neurosurgery recommended.  2.  Otherwise negative brain MRA.    Head CT 8/10/2018  1.  Postsurgical changes related to right ICA aneurysm clipping as described.  2.  Diminished gas within the subdural space with slight interval reaccumulation of low-density subdural fluid.  3.  No new hemorrhage or CT evidence for acute infarct.    Assessment/Plan:  Carlitos Hernandez is a 63 year old male with a PMH significant for hyperlipidemia, hypertension, and right ICA aneurysm s/p clipping who presents for second opinion regarding seizures. The description of the event does raise concern for a partial seizure in the immediate post-operative period in August 2018, possibly as an acute symptomatic seizure not representing a spontaneous seizure in epilepsy. Outside EEG reports indicated focal spikes or sharp waves. It is possible that breach artifact has confounded the apparent spikes, however.     We obtained a 3-hour VEEG today.  This did not show abnormal interictal epileptiform activity, but did show a right frontotemporal breach effect (consistent with craniotomy defect) and associated focal wicket waveforms.    We told the patient that we did not have evidence of ongoing elevated risk of partial seizures.  While  it is reasonable to continue tapering fully off levetiracetam as he has been pursing with Dr. Givens, we also discussed that one can never fully guarantee that an epileptic seizure might occur in the future, no matter the findings on interictal EEG.  He indicated that he is willing to have a small risk of seizure recurrence, in order to avoid adverse effects of levetiracetam or possible adverse effects of any other AED.  We suggested that he should review his desire to complete the taper off levetiracetam with Dr. Givens, and if there is any question of seizures in the future he should contact Dr. Givens or me immediately, and should cease driving or exposure to activities with risk of injury if any future event involves impaired consciousness or falling.    Patient seen and discussed with attending, Dr. Julian Barajas MD  Neurology PGY3    Report Prepared By: Tim Barajas MD, Neurology Resident   I agree with the findings and plan of care as documented.  I personally examined the patient, and discussed our epilepsy diagnostic impressions and EEG findings with the patient.  He was agreeable to this plan.       I spent 65 minutes in this patient care, more than 50% of which consisted of counseling and coordinating care.          Dick Jordan M.D.   Professor of Neurology

## 2019-02-13 NOTE — PROGRESS NOTES
[facility] [id number]  OP/3hr Video EEG  [facility] - [address]  Dr. Julian au

## 2019-02-14 NOTE — PROCEDURES
[facility] EEG #[id number] (Out-Patient Video-EEG Monitoring)    Name:     Carlitos Hernandez   MRN: [id number]   : 1955   Procedure Date: 2019   Duration of Recording:  3 hours, 4 minutes.      CLINICAL SUMMARY:  This diagnostic video-EEG monitoring procedure was performed in evaluation of encephalopathy and partial seizures in Carlitos Hernandez.  He was reported to be receiving levetiracetam at the time of this recording.  He was also reported to have undergone right intracranial carotid aneurysm clipping through a right frontotemporal craniotomy.      TECHNICAL SUMMARY:  This continuous EEG monitoring procedure was performed with 23 scalp electrodes in 10-20 system placements, and additional scalp, precordial and other surface electrodes used for electrical referencing and artifact detection.  A single channel of EKG was recorded for purposes of analyzing EKG artifacts in the EEG channels.  Video monitoring was utilized and periodically reviewed by EEG technologist and the physician for electroclinical correlation.    INTERICTAL EEG ACTIVITIES:  During maximal waking, there was a symmetric, well-modulated, approximately 10 Hz posterior dominant rhythm, which was attenuated on eye opening.  Lower amplitude faster activities predominated anteriorly.  Drowsiness was manifested by predominance of centrally maximum semirhythmic theta slowing and dropout of the posterior dominant rhythm during deeper drowsiness.  Symmetric sleep spindles were seen during stage II sleep.  There was symmetric bilateral driving in response to photic stimulation.  Hyperventilation resulted in a transitory buildup of shifting but overall symmetric generalized 2-8 Hz delta-theta slowing.      During waking and drowsiness there was an ongoing right frontotemporal breach effect, with runs of right frontotemporal alpha activities which at times had wicket spike morphologies.      No interictal epileptiform abnormalities were recorded.    ICTAL  RECORDINGS:  No electrographic seizures and no paroxysmal behavioral events occurred during this procedure.      SUMMARY OF VIDEO-EEG MONITORING:    The interictal EEG recording during waking and drowsiness was abnormal due to a right frontotemporal breach effect, with right frontotemporal wicket waveforms.    No pathological slowing, no interictal epileptiform abnormalities, no electrographic seizures, and no paroxysmal behavioral events were recorded during this procedure.    These findings are consistent with the known skull defect.  Clinical correlation is recommended.   Dick Jordan M.D., Professor of Neurology      D: 2019   T: 2019   MT: LUDA      Name:     URSULA FRITZ   MRN:      [id number]        Account:        [id number]   :      1955           Procedure Date: 2019      Document: [id number]

## 2019-02-17 DIAGNOSIS — E78.5 HYPERLIPIDEMIA, UNSPECIFIED HYPERLIPIDEMIA TYPE: ICD-10-CM

## 2019-02-18 RX ORDER — ATORVASTATIN CALCIUM 10 MG/1
10 TABLET, FILM COATED ORAL DAILY
Qty: 90 TABLET | Refills: 2 | Status: SHIPPED | OUTPATIENT
Start: 2019-02-18 | End: 2019-04-23

## 2019-04-21 ASSESSMENT — ENCOUNTER SYMPTOMS
ARTHRALGIAS: 1
DIZZINESS: 1

## 2019-04-21 ASSESSMENT — ACTIVITIES OF DAILY LIVING (ADL)
IN_THE_PAST_7_DAYS,_DID_YOU_NEED_HELP_FROM_OTHERS_TO_PERFORM_EVERYDAY_ACTIVITIES_SUCH_AS_EATING,_GETTING_DRESSED,_GROOMING,_BATHING,_WALKING,_OR_USING_THE_TOILET: N
IN_THE_PAST_7_DAYS,_DID_YOU_NEED_HELP_FROM_OTHERS_TO_TAKE_CARE_OF_THINGS_SUCH_AS_LAUNDRY_AND_HOUSEKEEPING,_BANKING,_SHOPPING,_USING_THE_TELEPHONE,_FOOD_PREPARATION,_TRANSPORTATION,_OR_TAKING_YOUR_OWN_MEDICATIONS?: N

## 2019-04-23 ENCOUNTER — OFFICE VISIT (OUTPATIENT)
Dept: INTERNAL MEDICINE | Facility: CLINIC | Age: 64
End: 2019-04-23
Payer: COMMERCIAL

## 2019-04-23 VITALS
BODY MASS INDEX: 27.34 KG/M2 | DIASTOLIC BLOOD PRESSURE: 81 MMHG | HEIGHT: 71 IN | HEART RATE: 61 BPM | WEIGHT: 195.3 LBS | OXYGEN SATURATION: 97 % | SYSTOLIC BLOOD PRESSURE: 128 MMHG | RESPIRATION RATE: 20 BRPM

## 2019-04-23 DIAGNOSIS — L82.1 SEBORRHEIC KERATOSES: ICD-10-CM

## 2019-04-23 DIAGNOSIS — H93.12 TINNITUS, LEFT: Primary | ICD-10-CM

## 2019-04-23 DIAGNOSIS — E78.5 HYPERLIPIDEMIA, UNSPECIFIED HYPERLIPIDEMIA TYPE: ICD-10-CM

## 2019-04-23 LAB
ANION GAP SERPL CALCULATED.3IONS-SCNC: 4 MMOL/L (ref 3–14)
BUN SERPL-MCNC: 15 MG/DL (ref 7–30)
CALCIUM SERPL-MCNC: 9.6 MG/DL (ref 8.5–10.1)
CHLORIDE SERPL-SCNC: 105 MMOL/L (ref 94–109)
CHOLEST SERPL-MCNC: 201 MG/DL
CO2 SERPL-SCNC: 29 MMOL/L (ref 20–32)
CREAT SERPL-MCNC: 0.98 MG/DL (ref 0.66–1.25)
GFR SERPL CREATININE-BSD FRML MDRD: 80 ML/MIN/{1.73_M2}
GLUCOSE SERPL-MCNC: 101 MG/DL (ref 70–99)
HDLC SERPL-MCNC: 48 MG/DL
LDLC SERPL CALC-MCNC: 113 MG/DL
NONHDLC SERPL-MCNC: 153 MG/DL
POTASSIUM SERPL-SCNC: 4 MMOL/L (ref 3.4–5.3)
SODIUM SERPL-SCNC: 139 MMOL/L (ref 133–144)
TRIGL SERPL-MCNC: 199 MG/DL

## 2019-04-23 RX ORDER — SODIUM PHOSPHATE,MONO-DIBASIC 19G-7G/118
3 ENEMA (ML) RECTAL DAILY
Qty: 200 CAPSULE | Refills: 0 | COMMUNITY

## 2019-04-23 RX ORDER — ATORVASTATIN CALCIUM 20 MG/1
20 TABLET, FILM COATED ORAL DAILY
Qty: 100 TABLET | Refills: 3 | Status: SHIPPED | OUTPATIENT
Start: 2019-04-23 | End: 2020-05-06 | Stop reason: ALTCHOICE

## 2019-04-23 ASSESSMENT — PAIN SCALES - GENERAL: PAINLEVEL: NO PAIN (0)

## 2019-04-23 ASSESSMENT — MIFFLIN-ST. JEOR: SCORE: 1693

## 2019-04-23 NOTE — NURSING NOTE
Chief Complaint   Patient presents with     Physical     annual physical     Pain     pain in right knee with activity   Sabrina Crow LPN 7:30 AM on 4/23/2019  Has been screened for HIV and was neagative.Sabrina Crow LPN 7:30 AM on 4/23/2019  Rooming Note  Blood Pressure   BP Readings from Last 1 Encounters:   04/23/19 128/81    Single BP recheck started, 7:32 AM (4 minutes)  Sabrina Crow LPN 7:32 AM on 4/23/2019  Sabrina Crow LPN 7:32 AM on 4/23/2019

## 2019-04-23 NOTE — PROGRESS NOTES
HPI  63-year-old presents today for physical examination with several concerns.  He has been having some tinnitus in the left ear.  This is been present since before his aneurysm surgery.  He was originally thought to be a inner ear infection was treated but he is continued to have some tinnitus year he also notices difficulty hearing out of this year when he has right ear is on the pillow at night.  He is never had a formal audiology evaluation although is done some smart phone hearing tests which he reports have been normal.  He has had no vertigo.  He has had pain and discomfort in the right knee.  This occurred after he did some wiring in the house and he was going up and down a ladder frequently.  He has not had any other injury or trauma said no locking or buckling but he has had pain and discomfort worse with walking and particularly running.  For this reason he is backed off running and uses a stair stepper for exercise.  He is off his seizure medication now is doing well in that regard he has several seborrheic keratoses which are itching and irritated and is requesting removal of this.  He is taking the atorvastatin for the cholesterol and is due for evaluation of this.  Past Medical History:   Diagnosis Date     Hyperlipemia      It band syndrome, right 11/23/2016     Labile hypertension      Plantar fasciitis 2/19/2018     Past Surgical History:   Procedure Laterality Date     rt craaniotomy for anerysm clipping  08/2018     thyroglossal duct cystectomy       TONSILLECTOMY       VASECTOMY       Family History   Problem Relation Age of Onset     Coronary Artery Disease Maternal Grandmother      Other Cancer Father         liver     Hypertension Mother      Asthma Sister      Social History     Socioeconomic History     Marital status:      Spouse name: None     Number of children: None     Years of education: None     Highest education level: None   Occupational History     None   Social Needs      "Financial resource strain: None     Food insecurity:     Worry: None     Inability: None     Transportation needs:     Medical: None     Non-medical: None   Tobacco Use     Smoking status: Never Smoker     Smokeless tobacco: Never Used   Substance and Sexual Activity     Alcohol use: Yes     Alcohol/week: 4.2 oz     Types: 7 Standard drinks or equivalent per week     Drug use: No     Sexual activity: None   Lifestyle     Physical activity:     Days per week: None     Minutes per session: None     Stress: None   Relationships     Social connections:     Talks on phone: None     Gets together: None     Attends Zoroastrianism service: None     Active member of club or organization: None     Attends meetings of clubs or organizations: None     Relationship status: None     Intimate partner violence:     Fear of current or ex partner: None     Emotionally abused: None     Physically abused: None     Forced sexual activity: None   Other Topics Concern     None   Social History Narrative     None       Exam:  /81 (BP Location: Right arm, Patient Position: Sitting, Cuff Size: Adult Regular)   Pulse 61   Resp 20   Ht 1.803 m (5' 11\")   Wt 88.6 kg (195 lb 4.8 oz)   SpO2 97%   BMI 27.24 kg/m    195 lbs 4.8 oz  Physical Exam   The patient is alert, oriented with a clear sensorium.   Skin shows numerous cherry angioma and seborrheic keratotic lesions no other rashes and good turgor.   Head is normocephalic and atraumatic.   Eyes show PERRLA with optic fundi showing deep optic cups.   Ears show normal TMs bilaterally.   Mouth shows clear oral mucosa.   Neck shows no nodes, thyromegaly or bruits.   Back is non tender.  Lungs are clear to percussion and auscultation.   Heart shows normal S1 and S2 without murmur or gallop.   Abdomen is soft and nontender without masses or organomegaly.   Extremities show no edema and no evidence of active synovitis right knee shows some medial posterior joint line tenderness full range of " motion no pain with hyperflexion negative Joel's and bounce home testing no varus or valgus instability..   Neurologic examination shows cranial nerves II-XII intact. Motor shows 5/5 strength. Reflexes are symmetric. Cerebellar testing shows normal tandem gait.  Romberg negative.    PROCEDURE NOTE: For seborrheic keratoses were treated with liquid nitrogen one on the right temple one on the right neck one in the right lower back and one on the right flank.  Tolerated this well without apparent symptoms or complications  ASSESSMENT  1 hypertension appears well controlled nonpharmacologically  2 hyperlipidemia needs follow-up and increased atorvastatin to 20 mg daily  3 left-sided tinnitus and hearing loss needs audiology evaluation  4 seborrheic keratoses status post liquid nitrogen  5 degenerative meniscal tear right knee  6 IGT    Plan  With an extended discussion regarding his knee and the need for therapy quadricep strengthening will try glucosamine and continue the Naprosyn as needed.  Been him see audiology for the hearing we discussed nonpharmacologic blood pressure control measures we will check his BMP and lipids today.    This note was completed using Dragon voice recognition software.  Although reviewed after completion, some word and grammatical errors may occur.    Ovidio Franco MD  General Internal Medicine  [facility]  [phone]

## 2019-04-24 ENCOUNTER — OFFICE VISIT (OUTPATIENT)
Dept: AUDIOLOGY | Facility: CLINIC | Age: 64
End: 2019-04-24
Attending: INTERNAL MEDICINE
Payer: COMMERCIAL

## 2019-04-24 DIAGNOSIS — H90.3 ASYMMETRICAL SENSORINEURAL HEARING LOSS: Primary | ICD-10-CM

## 2019-04-24 DIAGNOSIS — H90.3 SENSORY HEARING LOSS, BILATERAL: Primary | ICD-10-CM

## 2019-04-24 DIAGNOSIS — H93.12 TINNITUS, LEFT: ICD-10-CM

## 2019-04-24 NOTE — Clinical Note
Thanks for the referral. Find my results and recommendations below. See my note for the full report. Will you put in a referral for otolaryngology? I am recommending this because of asymmetry and left sided tinnitus. Results:Normal/borderline normal sloping to mild sensorineural hearing loss in the right ear, and normal/borderline normal sloping to moderate sensorineural hearing loss left ear. Asymmetry noted 4-8 kHz with the left ear poorer.Recommendations: Follow up with an ENT physician regarding asymmetrical sensorineural hearing loss. Continue to use hearing protection when using power tools. Patient is a borderline candidate for amplification- he is not interested at this time. Return to monitor hearing every 2 years, or sooner should concerns arise.Thanks,Kiko

## 2019-04-24 NOTE — PROGRESS NOTES
AUDIOLOGY REPORT      SUBJECTIVE:  Carlitos Hernandez is a 63 year old male who was seen in the Audiology Clinic at the [facility] for audiologic evaluation, referred by Ovidio Franco M.D. The patient reports having an inner ear infection in the left ear on July 6, 2018 with severe nausea and dizziness. Was treated with antibiotics, and infection resolved, but has noticed increased left sided tinnitus in the past few months, and continued intermittent mild unsteadiness. He reports having surgery for a brain aneurysm in the right carotid artery in August 2018, and attributed the continued imbalance to his medications and recovery, but the symptoms remain after full recovery and ceasing medications. Carlitos feels his hearing may be worse in the left ear. Reports history of noise exposure using loud power tools with hearing protection used recently, but not consistently in the past. The patient denies aural pain, pressure, drainage, and previous ear surgeries.     OBJECTIVE:  Fall Risk Screen:  1. Have you fallen two or more times in the past year? No  2. Have you fallen and had an injury in the past year? No    Timed Up and Go Score (in seconds): not tested  Is patient a fall risk? No  Referral initiated: No  Fall Risk Assessment Completed by Audiology    Otoscopic exam indicates ears are clear of cerumen bilaterally     Pure Tone Thresholds assessed using conventional audiometry with good reliability from 250-8000 Hz bilaterally using insert earphones and circumaural headphones     RIGHT:  Normal/ borderline-normal sloping to mild sensorineural hearing loss    LEFT:    Normal/ borderline-normal sloping to moderate sensorineural hearing loss  Asymmetry noted 4-8 kHz with the left ear poorer    Jhoan: Negative    Tympanogram:    RIGHT: Normal eardrum mobility    LEFT:  Normal eardrum mobility    Reflexes (reported by stimulus ear):  RIGHT: Ipsilateral is present at  normal levels  RIGHT: Contralateral is present at normal levels  LEFT:   Ipsilateral is present at normal levels  LEFT:   Contralateral is present at normal levels      Speech Reception Threshold:    RIGHT: 25 dB HL    LEFT:   25 dB HL  Word Recognition Score:     RIGHT: 100% at 60 dB HL using NU-6 recorded word list.    LEFT:   100% at 65 dB HL using NU-6 recorded word list.      ASSESSMENT:   Normal/borderline normal sloping to mild sensorineural hearing loss in the right ear, and normal/borderline normal sloping to moderate sensorineural hearing loss left ear. Asymmetry noted 4-8 kHz with the left ear poorer. Today s results were discussed with the patient in detail.     PLAN:  Patient was counseled regarding hearing loss and impact on communication.    Handout on good communication strategies was given to patient. It is recommended that the patient follow up with an ENT physician regarding asymmetrical sensorineural hearing loss. Continue to use hearing protection when using power tools. Patient is a borderline candidate for amplification- he is not interested at this time. Return to monitor hearing every 2 years, or sooner should concerns arise. Please call this clinic with questions regarding these results or recommendations.      Jennifer Felix.  Licensed Audiologist  [place] # [id number]

## 2019-06-18 DIAGNOSIS — Z91.038 HYMENOPTERA ALLERGY: ICD-10-CM

## 2019-06-20 RX ORDER — EPINEPHRINE 0.3 MG/.3ML
0.3 INJECTION SUBCUTANEOUS
Qty: 2 EACH | Refills: 1 | Status: SHIPPED | OUTPATIENT
Start: 2019-06-20 | End: 2021-01-27

## 2019-08-22 DIAGNOSIS — N52.9 ERECTILE DYSFUNCTION, UNSPECIFIED ERECTILE DYSFUNCTION TYPE: ICD-10-CM

## 2019-08-26 RX ORDER — SILDENAFIL 100 MG/1
100 TABLET, FILM COATED ORAL DAILY PRN
Qty: 18 TABLET | Refills: 3 | Status: SHIPPED | OUTPATIENT
Start: 2019-08-26 | End: 2020-07-01

## 2019-08-26 NOTE — TELEPHONE ENCOUNTER
sildenafil (VIAGRA) 100 MG tablet      Last Written Prescription Date:  8/11/19  Last Fill Quantity: N/A,   # refills: N/A  Last Office Visit : 4/23/19  Future Office visit:  none    Routing refill request to provider for review/approval because:  Medication is reported/historical

## 2019-11-16 DIAGNOSIS — E78.5 HYPERLIPIDEMIA, UNSPECIFIED HYPERLIPIDEMIA TYPE: ICD-10-CM

## 2019-11-18 RX ORDER — ATORVASTATIN CALCIUM 20 MG/1
20 TABLET, FILM COATED ORAL DAILY
Qty: 90 TABLET | Refills: 1 | Status: SHIPPED | OUTPATIENT
Start: 2019-11-18 | End: 2020-05-06 | Stop reason: ALTCHOICE

## 2019-11-18 NOTE — TELEPHONE ENCOUNTER
ATORVASTATIN 10 MG TABLET    Last Written Prescription Date:  4/23/2019  Last Fill Quantity: 100,   # refills: 3  Last Office Visit : 4/23/2019  Future Office visit:  None  90 Tabs, 1 Refill sent to pharmacy for Pt care 11/18/2019.      Stacie Fajardo RN  Central Triage Red Flags/Med Refills

## 2019-12-02 ENCOUNTER — TELEPHONE (OUTPATIENT)
Dept: INTERNAL MEDICINE | Facility: CLINIC | Age: 64
End: 2019-12-02

## 2019-12-02 NOTE — TELEPHONE ENCOUNTER
I received a fax from the pharmacy requesting clarification for the atorvastatin dose. Per the fax, patient stated he should be taking 10 mg daily, though was given a 20 mg prescription recently. The pharmacy is requesting clarification.    I updated the pharmacy that the patient should be taking atorvastatin 20 mg daily. This change was made in April after last physical and labs. Per the pharmacy, the patient was previously filling a script for atorvastatin 10 mg tablets instead of the 20 mg. He did recently fill the atorvastatin 20 mg prescription.     I called Carlitos and left a message regarding the atorvastatin. He should be taking atorvastatin 20 mg daily. I advised him to start this dose if he has not done so already.    Herminia Venegas) ROBIN Nolan

## 2019-12-05 ENCOUNTER — OFFICE VISIT (OUTPATIENT)
Dept: FAMILY MEDICINE | Facility: CLINIC | Age: 64
End: 2019-12-05
Payer: COMMERCIAL

## 2019-12-05 VITALS
DIASTOLIC BLOOD PRESSURE: 80 MMHG | BODY MASS INDEX: 27.22 KG/M2 | TEMPERATURE: 97.9 F | WEIGHT: 195.2 LBS | OXYGEN SATURATION: 95 % | HEART RATE: 66 BPM | SYSTOLIC BLOOD PRESSURE: 129 MMHG

## 2019-12-05 DIAGNOSIS — H92.01 RIGHT EAR PAIN: Primary | ICD-10-CM

## 2019-12-05 DIAGNOSIS — R39.9 UTI SYMPTOMS: ICD-10-CM

## 2019-12-05 DIAGNOSIS — H65.91 RIGHT NON-SUPPURATIVE OTITIS MEDIA: ICD-10-CM

## 2019-12-05 RX ORDER — NEOMYCIN SULFATE, POLYMYXIN B SULFATE, HYDROCORTISONE 3.5; 10000; 1 MG/ML; [USP'U]/ML; MG/ML
3 SOLUTION/ DROPS AURICULAR (OTIC) 3 TIMES DAILY
Qty: 10 ML | Refills: 0 | Status: SHIPPED | OUTPATIENT
Start: 2019-12-05 | End: 2021-10-19

## 2019-12-05 RX ORDER — AZITHROMYCIN 250 MG/1
TABLET, FILM COATED ORAL
Qty: 6 TABLET | Refills: 0 | Status: SHIPPED | OUTPATIENT
Start: 2019-12-05 | End: 2019-12-10

## 2019-12-05 ASSESSMENT — ENCOUNTER SYMPTOMS
FEVER: 0
HALLUCINATIONS: 0
CHILLS: 0

## 2019-12-05 ASSESSMENT — PAIN SCALES - GENERAL: PAINLEVEL: NO PAIN (0)

## 2019-12-05 NOTE — PROGRESS NOTES
"       HPI       Carlitos Hernandez is a 64 year old man who presents with right ear pain. Carlitos has had pain for the past 2 days. He is concerned because he had a significant ear infection on the left one year ago and this caused significant distress for him. He \"came in early to ensure that this gets looked at.\"  Currently, he is not having any pain, however it was painful last evening.   Chief Complaint   Patient presents with     Ear Problem     Pt has hearing loss and pain on both ears.      Problem, Medication and Allergy Lists were reviewed and updated if needed.    Patient is an established patient of this clinic.         Review of Systems:   Review of Systems     Constitutional:  Negative for fever, chills and hallucinations.   HENT:  Positive for ear pain.    Psychiatric/Behavioral:  Negative for hallucinations.                Physical Exam:     Vitals:    12/05/19 1114   BP: 129/80   Pulse: 66   Temp: 97.9  F (36.6  C)   SpO2: 95%   Weight: 88.5 kg (195 lb 3.2 oz)     Body mass index is 27.22 kg/m .  Vitals were reviewed and were normal.     Physical Exam  Constitutional:       Appearance: Normal appearance.   HENT:      Head: Normocephalic.      Right Ear: Swelling and tenderness present. Tympanic membrane is erythematous. Tympanic membrane is not bulging.      Left Ear: Hearing, tympanic membrane, ear canal and external ear normal.   Musculoskeletal: Normal range of motion.   Skin:     General: Skin is warm and dry.   Neurological:      General: No focal deficit present.      Mental Status: He is alert and oriented to person, place, and time.   Psychiatric:         Mood and Affect: Mood normal.         Behavior: Behavior normal.         Results:     No diagnostics ordered today.     Assessment and Plan     1. Right ear pain    - azithromycin (ZITHROMAX) 250 MG tablet; Take 2 tablets (500 mg) by mouth daily for 1 day, THEN 1 tablet (250 mg) daily for 4 days.  Dispense: 6 tablet; Refill: 0  - " neomycin-polymyxin-hydrocortisone (CORTISPORIN) 3.5-00582-7 otic solution; Place 3 drops into the right ear 3 times daily  Dispense: 10 mL; Refill: 0    2. Right non-suppurative otitis media    - azithromycin (ZITHROMAX) 250 MG tablet; Take 2 tablets (500 mg) by mouth daily for 1 day, THEN 1 tablet (250 mg) daily for 4 days.  Dispense: 6 tablet; Refill: 0  - neomycin-polymyxin-hydrocortisone (CORTISPORIN) 3.5-69599-8 otic solution; Place 3 drops into the right ear 3 times daily  Dispense: 10 mL; Refill: 0      There are no discontinued medications.   Patient instructions: first, start the oral antibiotic for your right ear infection. Next, consider taking a probiotic at least 20 billion CFUs per capsule daily while you are on the antibiotic. Also take the probiotic one hour before or two hours after your antibiotic dose. You can also utilize the drops for 3 times daily.Please call the clinic with worsening or persisting symptoms. Options for treatment and follow-up care were reviewed with the patient. Carlitos Hernandez  engaged in the decision making process and verbalized understanding of the options discussed and agreed with the final plan.  CHERYL Wadsworth, CNP  Addendum 12/11/2019 0911: I called to check on Carlitos as he called in yesterday to report fluid continuing in his right ear. The pain has resolved. I recommended that he try taking an antihistamine to help resolved the fluid. He will this. He will call back with any concerns or questions. He verbalizes understanding of the current plan.   CHERYL Wadsworth, CNP

## 2019-12-05 NOTE — NURSING NOTE
64 year old  Chief Complaint   Patient presents with     Ear Problem     Pt has hearing loss and pain on both ears.        Blood pressure 129/80, pulse 66, temperature 97.9  F (36.6  C), weight 88.5 kg (195 lb 3.2 oz), SpO2 95 %. Body mass index is 27.22 kg/m .  BP completed using cuff size:      DANILO Conway  December 5, 2019 11:21 AM

## 2019-12-05 NOTE — PATIENT INSTRUCTIONS
First, start the oral antibiotic for your right ear infection. Next, consider taking a probiotic at least 20 billion CGFUs per capsule daily while you are on the antibiotic. Also take the probiotic one hour before or two hours after your antibiotic dose. Please call the clinic with worsening or persisting symptoms.   [facility] Medication Refill Request Information:  * Please contact your pharmacy regarding ANY request for medication refills.  ** NP Clinic Prescription Fax = [phone]  * Please allow 3 business days for routine medication refills.  * Please allow 5 business days for controlled substance medication refills.     [facility] Test Result notification information:  *You will be notified with in 7-10 days of your appointment day regarding the results of your test.  If you are on MyChart you will be notified as soon as the provider has reviewed the results and signed off on them.    [facility]: [phone]

## 2020-05-05 ENCOUNTER — VIRTUAL VISIT (OUTPATIENT)
Dept: INTERNAL MEDICINE | Facility: CLINIC | Age: 65
End: 2020-05-05
Payer: COMMERCIAL

## 2020-05-05 ENCOUNTER — AMBULATORY - HEALTHEAST (OUTPATIENT)
Dept: LAB | Facility: CLINIC | Age: 65
End: 2020-05-05

## 2020-05-05 ENCOUNTER — MYC MEDICAL ADVICE (OUTPATIENT)
Dept: INTERNAL MEDICINE | Facility: CLINIC | Age: 65
End: 2020-05-05

## 2020-05-05 DIAGNOSIS — E78.5 HYPERLIPIDEMIA, UNSPECIFIED HYPERLIPIDEMIA TYPE: Primary | ICD-10-CM

## 2020-05-05 DIAGNOSIS — E78.1 PURE HYPERTRIGLYCERIDEMIA: ICD-10-CM

## 2020-05-05 ASSESSMENT — PAIN SCALES - GENERAL: PAINLEVEL: NO PAIN (0)

## 2020-05-05 NOTE — PROGRESS NOTES
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   Last year lipids were elevated and feels it was not done fasting but he eventually did increase atorvastatin from 10 to 20 mg and reports that significant increase in myalgias after only a week.  Quit for a week and symptoms resolved.  He resumed the 10 mg with more recurrence of muscle aches but as bad as previously.  In January again felt better off atorvastatin and it recurred when restarted.  He has repeated the testing and had similar effect.  On the 20 mg he has been taking 1/2 of a generic form.  No recent lipid level and reports has lost weight recently down to 185 along with decreased simple CHO and alcohol.  Discussed getting fasting lipids now off statin with recent lifestyle changes and try rosuvastatin 5 every other day if needed.  Had questions regarding EPA and DHA.   Also uses glucosamine which he seems to be helping although recent flare relate to running.  Better with rest and will try new shoes in place  (28 minutes)  Ovidio Franco MD

## 2020-05-05 NOTE — NURSING NOTE
Chief Complaint   Patient presents with     Musculoskeletal Problem     Discuss muscle aches.     DANILO Conway 12:13 PM  5/5/2020

## 2020-05-06 ENCOUNTER — AMBULATORY - HEALTHEAST (OUTPATIENT)
Dept: LAB | Facility: CLINIC | Age: 65
End: 2020-05-06

## 2020-05-06 DIAGNOSIS — E78.1 PURE HYPERTRIGLYCERIDEMIA: ICD-10-CM

## 2020-05-06 DIAGNOSIS — E78.5 HYPERLIPIDEMIA: ICD-10-CM

## 2020-05-06 LAB
CHOLEST SERPL-MCNC: 199 MG/DL
FASTING STATUS PATIENT QL REPORTED: YES
HDLC SERPL-MCNC: 49 MG/DL
LDLC SERPL CALC-MCNC: 124 MG/DL
TRIGL SERPL-MCNC: 132 MG/DL

## 2020-05-06 RX ORDER — ROSUVASTATIN CALCIUM 5 MG/1
5 TABLET, COATED ORAL EVERY OTHER DAY
Qty: 45 TABLET | Refills: 3 | Status: SHIPPED | OUTPATIENT
Start: 2020-05-06 | End: 2021-02-19

## 2020-06-10 ENCOUNTER — COMMUNICATION - HEALTHEAST (OUTPATIENT)
Dept: FAMILY MEDICINE | Facility: CLINIC | Age: 65
End: 2020-06-10

## 2020-06-10 ENCOUNTER — TELEPHONE (OUTPATIENT)
Dept: INTERNAL MEDICINE | Facility: CLINIC | Age: 65
End: 2020-06-10

## 2020-06-10 ENCOUNTER — AMBULATORY - HEALTHEAST (OUTPATIENT)
Dept: LAB | Facility: CLINIC | Age: 65
End: 2020-06-10
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
## 2020-06-10 NOTE — TELEPHONE ENCOUNTER
[facility] Call Center    Phone Message    May a detailed message be left on voicemail: yes     Reason for Call: Other: Lab Order request: Yvette from [facility] lab is requesting for the patient's lab order, Lyme Disease Jody with reflex to [facility] lab order, to be faxed to [phone]. Please advise.     Action Taken: Message routed to:  [facility]    Travel Screening: Not Applicable

## 2020-06-11 ENCOUNTER — TRANSFERRED RECORDS (OUTPATIENT)
Dept: HEALTH INFORMATION MANAGEMENT | Facility: CLINIC | Age: 65
End: 2020-06-11

## 2020-06-11 ENCOUNTER — AMBULATORY - HEALTHEAST (OUTPATIENT)
Dept: LAB | Facility: CLINIC | Age: 65
End: 2020-06-11

## 2020-06-11 DIAGNOSIS — E78.5 HYPERLIPIDEMIA: ICD-10-CM
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
## 2020-06-11 LAB
CHOLEST SERPL-MCNC: 208 MG/DL
FASTING STATUS PATIENT QL REPORTED: YES
HDLC SERPL-MCNC: 47 MG/DL
LDLC SERPL CALC-MCNC: 134 MG/DL
TRIGL SERPL-MCNC: 136 MG/DL

## 2020-06-12 LAB — B BURGDOR IGG+IGM SER QL: 0.14 INDEX VALUE

## 2020-07-01 ENCOUNTER — OFFICE VISIT (OUTPATIENT)
Dept: INTERNAL MEDICINE | Facility: CLINIC | Age: 65
End: 2020-07-01
Payer: COMMERCIAL

## 2020-07-01 VITALS
OXYGEN SATURATION: 96 % | BODY MASS INDEX: 25.8 KG/M2 | HEART RATE: 72 BPM | DIASTOLIC BLOOD PRESSURE: 81 MMHG | WEIGHT: 185 LBS | SYSTOLIC BLOOD PRESSURE: 132 MMHG

## 2020-07-01 DIAGNOSIS — N52.9 ERECTILE DYSFUNCTION, UNSPECIFIED ERECTILE DYSFUNCTION TYPE: ICD-10-CM

## 2020-07-01 DIAGNOSIS — L82.1 SEBORRHEIC KERATOSIS: ICD-10-CM

## 2020-07-01 DIAGNOSIS — L30.9 DERMATITIS: Primary | ICD-10-CM

## 2020-07-01 DIAGNOSIS — D18.01 CHERRY ANGIOMA: ICD-10-CM

## 2020-07-01 RX ORDER — SILDENAFIL 100 MG/1
100 TABLET, FILM COATED ORAL DAILY PRN
Qty: 18 TABLET | Refills: 3 | Status: SHIPPED | OUTPATIENT
Start: 2020-07-01

## 2020-07-01 RX ORDER — TRIAMCINOLONE ACETONIDE 1 MG/G
CREAM TOPICAL 2 TIMES DAILY
Qty: 15 G | Refills: 1 | Status: SHIPPED | OUTPATIENT
Start: 2020-07-01 | End: 2021-08-05

## 2020-07-01 NOTE — NURSING NOTE
Chief Complaint   Patient presents with     Physical     pt here for physical. see blood tests in care everywhere     Kimberly Nissen, EMT at 12:47 PM on 7/1/2020

## 2020-07-01 NOTE — PROGRESS NOTES
HPI  64-year-old returns today for physical examination and reevaluation.  He has resumed taking the rosuvastatin is taking his 5 mg every other day he is tolerating this well he did have a recent lipid study done at [facility] which shows no change however however he is only been on the medication for about a month.  We discussed continuing it through Labor Day and then reassessing at that time.  The number of skin lesions that he is concerned about and we reviewed these we discussed the risk benefits of liquid nitrogen and he consented to this.  He is also had intermittent episodes of a pruritus or itching on the bottom of predominantly his right foot.  This tends to occur only at rest when he is driving and not moving the foot.  He has had no associated injury or trauma here it last for about 15 minutes it improves with scratching.  It does not occur with physical activity or with other activities when he is distracted and not doing anything.  There is been no rash associated with this.  He occasionally gets some sciatic pain in the right leg but this is separate from the symptom.  Otherwise he has been doing well exercising regularly eating healthy and sleeping well.  He has had several tick bites he had one on his back that has caused persistent redness and inflammation.  This is also intermittently pruritic.  Past Medical History:   Diagnosis Date     Hyperlipemia      It band syndrome, right 11/23/2016     Labile hypertension      Plantar fasciitis 2/19/2018     Past Surgical History:   Procedure Laterality Date     rt craaniotomy for anerysm clipping  08/2018     thyroglossal duct cystectomy       TONSILLECTOMY       VASECTOMY       Family History   Problem Relation Age of Onset     Coronary Artery Disease Maternal Grandmother      Other Cancer Father         liver     Hypertension Mother      Asthma Sister      Social History     Socioeconomic History     Marital status:      Spouse name: None      Number of children: None     Years of education: None     Highest education level: None   Occupational History     None   Social Needs     Financial resource strain: None     Food insecurity     Worry: None     Inability: None     Transportation needs     Medical: None     Non-medical: None   Tobacco Use     Smoking status: Never Smoker     Smokeless tobacco: Never Used   Substance and Sexual Activity     Alcohol use: Yes     Alcohol/week: 7.0 standard drinks     Types: 7 Standard drinks or equivalent per week     Drug use: No     Sexual activity: None   Lifestyle     Physical activity     Days per week: None     Minutes per session: None     Stress: None   Relationships     Social connections     Talks on phone: None     Gets together: None     Attends Christianity service: None     Active member of club or organization: None     Attends meetings of clubs or organizations: None     Relationship status: None     Intimate partner violence     Fear of current or ex partner: None     Emotionally abused: None     Physically abused: None     Forced sexual activity: None   Other Topics Concern     None   Social History Narrative     None     Complete review of symptoms negative except as noted above.    Exam:  /81   Pulse 72   Wt 83.9 kg (185 lb)   SpO2 96%   BMI 25.80 kg/m    185 lbs 0 oz  Physical Exam   The patient is alert, oriented with a clear sensorium.   Skin shows numerous seborrheic keratotic  Lesions on his back which was treated with liquid nitrogen for 4 separate lesions.  He had a cherry angioma on his right flank which he requested that we also treated with liquid nitrogen and this too was treated.  Tolerated this well without any symptoms or problems, he had an area of inflammatory erythematous flaking at the site of a tick bite on his right back.  There was no other rashes and good turgor.   Head is normocephalic and atraumatic.   Eyes show PERRLA    Ears show normal TMs bilaterally.   Mouth shows  clear oral mucosa.   Neck shows no nodes, thyromegaly or bruits.   Back is non tender.  Lungs are clear to percussion and auscultation.   Heart shows normal S1 and S2 without murmur or gallop.   Abdomen is soft and nontender without masses or organomegaly.   Genitalia show normal testes. No evidence of inguinal hernia.  Rectal shows small smooth prostate without nodules or masses.  Extremities show no edema and no evidence of active synovitis.   Neurologic examination shows cranial nerves II-XII intact. Motor shows 5/5 strength. Reflexes are symmetric. Cerebellar testing shows normal tandem gait.  Romberg negative.      ASSESSMENT  1 hyperlipidemia  2 erectile dysfunction  3 seborrheic keratoses  4 localized inflammatory reaction  5 probable neuralgia right foot    Plan  We will have him use triamcinolone to the local inflammatory reaction continue on the rosuvastatin recheck his lipids in September have him follow-up sooner immediately if any symptoms or problems before that time    This note was completed using Dragon voice recognition software.        Ovidio Franco MD  General Internal Medicine  [facility]  [phone]

## 2020-07-24 DIAGNOSIS — E78.5 HYPERLIPIDEMIA, UNSPECIFIED HYPERLIPIDEMIA TYPE: ICD-10-CM

## 2020-07-29 RX ORDER — ATORVASTATIN CALCIUM 20 MG/1
TABLET, FILM COATED ORAL
Qty: 90 TABLET | Refills: 4 | OUTPATIENT
Start: 2020-07-29

## 2020-12-27 ENCOUNTER — HEALTH MAINTENANCE LETTER (OUTPATIENT)
Age: 65
End: 2020-12-27

## 2021-01-25 DIAGNOSIS — Z91.038 HYMENOPTERA ALLERGY: ICD-10-CM

## 2021-01-27 RX ORDER — EPINEPHRINE 0.3 MG/.3ML
0.3 INJECTION SUBCUTANEOUS
Qty: 2 EACH | Refills: 1 | Status: SHIPPED | OUTPATIENT
Start: 2021-01-27

## 2021-01-27 NOTE — TELEPHONE ENCOUNTER
EPINEPHRINE 0.3 MG AUTO-INJECT      Last Written Prescription Date:  6/20/2019  Last Fill Quantity: 2,   # refills: 1  Last Office Visit : 7/1/2020  Future Office visit:  None  Routing refill request to provider for review/approval because:  Gap in Refills      Last filled June 2019  Refer to clinic for review       Stacie Fajardo RN  Central Triage Red Flags/Med Refills

## 2021-02-17 DIAGNOSIS — E78.5 HYPERLIPIDEMIA, UNSPECIFIED HYPERLIPIDEMIA TYPE: ICD-10-CM

## 2021-02-19 RX ORDER — ROSUVASTATIN CALCIUM 5 MG/1
TABLET, COATED ORAL
Qty: 45 TABLET | Refills: 1 | Status: SHIPPED | OUTPATIENT
Start: 2021-02-19 | End: 2021-04-14

## 2021-02-19 NOTE — TELEPHONE ENCOUNTER
rosuvastatin (CRESTOR) 5 MG tablet   Take 1 tablet (5 mg) by mouth every other day     Last Written Prescription Date:  5/6/20  Last Fill Quantity: 45,   # refills: 3  Last Office Visit : 7/1/20     Future Office visit:  none    Routing refill request to provider for review/approval because:  Per last visit, - continue on the rosuvastatin recheck his lipids in September have him follow-up. Future order LDL in que.     FYI : Outside lab - LDL 6/11/20

## 2021-04-11 ENCOUNTER — MYC MEDICAL ADVICE (OUTPATIENT)
Dept: INTERNAL MEDICINE | Facility: CLINIC | Age: 66
End: 2021-04-11

## 2021-04-11 DIAGNOSIS — E78.5 HYPERLIPIDEMIA, UNSPECIFIED HYPERLIPIDEMIA TYPE: ICD-10-CM

## 2021-04-14 RX ORDER — ROSUVASTATIN CALCIUM 5 MG/1
TABLET, COATED ORAL
Qty: 45 TABLET | Refills: 1 | Status: SHIPPED | OUTPATIENT
Start: 2021-04-14 | End: 2021-11-23

## 2021-06-01 VITALS — HEIGHT: 72 IN | WEIGHT: 185.3 LBS | BODY MASS INDEX: 25.1 KG/M2

## 2021-06-01 VITALS — BODY MASS INDEX: 25.87 KG/M2 | HEIGHT: 72 IN | WEIGHT: 191 LBS

## 2021-06-02 VITALS — WEIGHT: 185 LBS | BODY MASS INDEX: 25.06 KG/M2 | HEIGHT: 72 IN

## 2021-06-08 NOTE — TELEPHONE ENCOUNTER
Who is calling:  Error  Reason for Call:  error  Date of last appointment with primary care: error  Okay to leave a detailed message: No

## 2021-06-19 NOTE — ANESTHESIA PREPROCEDURE EVALUATION
Anesthesia Evaluation      Patient summary reviewed   No history of anesthetic complications     Airway   Mallampati: I  Neck ROM: full   Pulmonary - negative ROS and normal exam                          Cardiovascular - normal exam  Exercise tolerance: > or = 4 METS  (+) , hypercholesterolemia,      Neuro/Psych - negative ROS     Endo/Other - negative ROS      GI/Hepatic/Renal - negative ROS           Dental - normal exam   (+) caps                       Anesthesia Plan  Planned anesthetic: general endotracheal  50 mg ketamine IV on induction.    ASA 2   Induction: intravenous   Anesthetic plan and risks discussed with: patient  Anesthesia plan special considerations: antiemetics, arterial catheterization, IV therapy two IVs, dexmedetomidine  Post-op plan: routine recovery

## 2021-06-19 NOTE — ANESTHESIA POSTPROCEDURE EVALUATION
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, COMPLEX INTRAOPERATIVE ANGIOGRAMS  Anesthesia type: general    Patient location: icu  Last vitals:   Vitals:    08/13/18 1000   BP: 131/83   Pulse: 76   Resp: 18   Temp: 37.1  C (98.7  F)   SpO2:      Post vital signs: stable  Level of consciousness: awake and responds to simple questions  Post-anesthesia pain: pain controlled  Post-anesthesia nausea and vomiting: no  Pulmonary: unassisted, return to baseline  Cardiovascular: stable and blood pressure at baseline  Hydration: adequate  Anesthetic events: no    QCDR Measures:  ASA# 11 - Melanie-op Cardiac Arrest: ASA11B - Patient did NOT experience unanticipated cardiac arrest  ASA# 12 - Melanie-op Mortality Rate: ASA12B - Patient did NOT die  ASA# 13 - PACU Re-Intubation Rate: ASA13B - Patient did NOT require a new airway mgmt  ASA# 10 - Composite Anes Safety: ASA10A - No serious adverse event    Additional Notes:  Stable and being discharged today

## 2021-06-19 NOTE — ANESTHESIA CARE TRANSFER NOTE
Last vitals:   Vitals:    08/09/18 1232   BP: 140/65   Pulse: 62   Resp: 14   Temp: 37.2  C (98.9  F)   SpO2: 100%     Patient's level of consciousness is drowsy  Spontaneous respirations: yes  Maintains airway independently: yes  Dentition unchanged: yes  Oropharynx: oropharynx clear of all foreign objects    QCDR Measures:  ASA# 20 - Surgical Safety Checklist: WHO surgical safety checklist completed prior to induction  PQRS# 430 - Adult PONV Prevention: 4558F - Pt received => 2 anti-emetic agents (different classes) preop & intraop  ASA# 8 - Peds PONV Prevention: NA - Not pediatric patient, not GA or 2 or more risk factors NOT present  PQRS# 424 - Melanie-op Temp Management: 4559F - At least one body temp DOCUMENTED => 35.5C or 95.9F within required timeframe  PQRS# 426 - PACU Transfer Protocol: - Transfer of care checklist used  ASA# 14 - Acute Post-op Pain: ASA14B - Patient did NOT experience pain >= 7 out of 10

## 2021-06-19 NOTE — PROGRESS NOTES
Preop Assessment: Carlitos MARK Hernandez presents for pre-op review.  Surgeon: Dr. 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 Sisi Mclean cleared pt for surgery  History of ASA, NSAIDS, vitamin and/or herbal supplements within 10 days: No  History of blood thinners: No  History of anti-seizure med's: No  Review of systems: feels well today other ongoing mild vertigo    Last BM: 18  Hx nausea/vomiting: denies  Hx urinary retention: denies  Pain management: not taking pain meds  Patient confirmed they have help/assistance in place at home upon discharge: home with wife after surgery    Diagnostics:  Labs: 18 WNL  CXR: not ordered   EK18 SR  Films: MRI/MRA 18 in [facility]    All questions answered regarding surgery and expected pre and postoperative course including rehabilitation phase.     Reviewed with patient: Arrive 2.5  hours prior to scheduled surgery, nothing to eat or drink after midnight the night before surgery and bring all pertinent films to the hospital the day of surgery.  Continue to refrain from NSAIDS (Ibuprofen, Aleve, Naprosyn) ASA or over the counter herbal medication or supplements, anticoagulants and blood thinners.    Preop skin preparations and instructions provided.    Pt signed surgical consent at readiness visit on 18    Pt requested not to view craniotomy video at readiness visit on 18.     Antonella Gabriel RN

## 2021-06-19 NOTE — ANESTHESIA PROCEDURE NOTES
Arterial Line  Reason for Procedure: hemodynamic monitoring  Patient location during procedure: Pre-op  Start time: 8/9/2018 7:46 AM  End time: 8/9/2018 7:55 AM  Staffing:  Performing  Anesthesiologist: TARIQ RODAS  Performing CRNA: PHOEBE PACHECO  Sterile Precautions:  sterile barriers used during insertion: cap, mask, sterile gloves, large sheet, and hand hygiene used.  Arterial Line:   Immediately prior to procedure a time out was called to verify the correct patient, procedure, equipment, support staff and site/side marked as required  Laterality: left  Location: radial  Prepped with: ChloroPrep    Needle gauge: 20 G  Number of Attempts: 1  Secured with: tape, transparent dressing and pressure dressing  Flushed with: saline  1% lidocaine local anesthesia used for skin prep.   See MAR for additional medications given.  Ultrasound evaluation of access site: yes

## 2021-06-19 NOTE — PROGRESS NOTES
Carlitos Hernandez was presented to [facility] ED on 7/6/2018 with an acute onset of dizziness and nausea. MRI revealed 8 mm aneurysm at the carotid terminus. This is an incidental finding. Carlitos was discharged home on meclizine.  Today he is here with his SO for discussion of MRI findings and the next step of treatment. He presents with a chief complaint of bilateral diplopia. He reports improved dizziness, denies nausea. His gait and balance are off at times. Denies HA, photo or phonophobia. Speech is fluent. Cognition is intact.  Fanta Austin RN, CNRN

## 2021-06-19 NOTE — PROGRESS NOTES
Dear Dr. Franco:   I had the pleasure of seeing Carlitos Hernandez in consultation by neurosurgery clinic for an incidental finding of a right carotid terminus aneurysm was found when he came to the emergency department for vertigo.  He denies any family history of cerebral aneurysms or cerebral hemorrhages denies any family history of connective tissue disorders.  Denies any tobacco or recreational drug abuse.  His MRA showed a 8 x 5 x 5 x 7 mm aneurysm projecting superiorly from the right carotid terminus.  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  I reviewed the natural history of cerebral aneurysms with Carlitos and his wife who accompanies him today as well as the risks of rupture and morbidity and risk factors of rupture including hypertension.  I did review treatment options including surveillance monitoring versus coil embolization versus clipping.  He would like to proceed with craniotomy and clipping of the aneurysm.  Thank you for giving me the opportunity to see this very pleasant patient.  If you have any questions about him or any other patients please feel free to contact me.  Of note I spent greater than 45 minutes counseling the patient regarding his pathology and treatment plan, greater than 50 percent of which was spent in face to face counseling.    Tigist Davidson MD, FAANS

## 2021-06-20 NOTE — PROGRESS NOTES
CHART NOTE     1955     DATE OF SERVICE: 9/20/2018     FOLLOW UP EVALUATION:      ASSESSMENT AND PLAN:Carlitos Hernandez is status post RIGHT CRANIOTOMY AND CLIPPING OF RIGHT CAROTID TERMINUS ANEURYSM, COMPLEX INTRAOPERATIVE ANGIOGRAMS, SSEP by Dr. Davidson on 8/13/2018  63-year-old male in previous good health, during assessment for vertigo scan showed incidental finding of a wide based carotid terminus aneurysm that was favorable for clipping. Patient taken to elective surgery.  Postoperatively in PACU, patient became less responsive,  not protecting airway and was re-intubated.  Questionable signs of seizure activity noted;  loaded with Keppra. Neurology consulted, overnight EEG preformed with no evidence of epileptiform activity but did show rhythmic discharges assoc with increased risk of seizures.    Patient extubated POD #2.   Patient also spiked a temp, presumed aspiration pneumonia  Today, he reports ongoing tinnitus unchanged from pre op.  Also, dizziness, which he associates w Keppra.  He presently takes 250 mg am and 750 mg pm. He denies sx/s of seizures. He feels that vertigo is different from pre op. His Keppra level is low--so less likely associated w Keppra. Patient and wife would like to be off Keppra as soon as possible. I called Dr Givens.  Plan for sleep deprived EEG the week of October 21st, pending that result will wean Keppra or not.  Told him that he can slowly increase activity. No contact sports for 6 months, maintain BP < 140.   I recommended work up by ENT for hearing loss and dizziness.       PAST MEDICAL HISTORY, SURGICAL HISTORY, REVIEW OF SYMPTOMS, MEDICATIONS AND ALLERGIES:  Past medical history, surgical history, review of symptoms, medications and allergies remain unchanged.    Vitals:    09/20/18 1326   BP: 123/83   Pulse: 79   SpO2: 94%        PHYSICAL EXAM:  Neurological exam reveals:  Alert and oriented x3, speech fluent and appropriate.   PERRL, EOMI, face symmetric, tongue  midline, shoulder shrug equal  No pronator drift, finger to nose smooth and accurate bilaterally  Arm strength bilateral grasp, biceps, triceps, and deltoids 5/5   Leg strength bilateral dorsiflexion, plantar flexion, and hip flexion 5/5  Muscle Bulk and tone wnl.   Reflexes:No pathological reflexes   Gait and station:Normal  Incision:healing well

## 2021-06-20 NOTE — PROGRESS NOTES
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  Today he returns in follow up for stples removal. He is accompanied by his spouse. He is doing reasonably well - reports a mild not positional HA, denies sensory or motor issues, N/V, visual or hearing disturbance. Gait and balance are normal. Speech is fluent. Cognition is intact. On Keppra managed by Dr. Givens.    Surgical wound WNL - CDI, no signs of infection or skin breakdown.  Incision well-healed: good skin approximation, no redness or visible/palpable edema, no tenderness to palpation.  PT. AF, denies fever, chills or sweats.  Pt. reports that the symptoms are improved from pre-op.    Staples - intact removed without difficulty. Wound prepped with Betadine before and after removal.  Surrounding skin has no signs of breakdown.  Verbal instructions regarding incision care are given.  Pt. advised to call us if any s/s of infection noted - all discussed in details.      Fanta Austin RN, CNRN

## 2021-07-02 ENCOUNTER — OFFICE VISIT (OUTPATIENT)
Dept: ORTHOPEDICS | Facility: CLINIC | Age: 66
End: 2021-07-02
Payer: COMMERCIAL

## 2021-07-02 DIAGNOSIS — M25.511 RIGHT SHOULDER PAIN, UNSPECIFIED CHRONICITY: Primary | ICD-10-CM

## 2021-07-02 DIAGNOSIS — M25.511 ACUTE PAIN OF RIGHT SHOULDER: Primary | ICD-10-CM

## 2021-07-02 PROCEDURE — 99203 OFFICE O/P NEW LOW 30 MIN: CPT | Performed by: FAMILY MEDICINE

## 2021-07-02 NOTE — PROGRESS NOTES
[facility]  SPORTS & ORTHOPEDIC CLINIC VISIT     Jul 2, 2021        ASSESSMENT & PLAN    65-year-old male with rotator cuff tendinopathy of the right shoulder    Reviewed imaging and assessment with patient in detail  Discussed treatment with home exercises and provided with referral to physical therapy.  Okay to use as needed Tylenol or NSAIDs as needed for pain.  We discussed continued activity and activity modifications in detail.  If he is unimproved after 6 to 8 weeks you should follow-up in clinic for reevaluation.    Billy Camara MD  [facility] SPORTS MEDICINE CLINIC [place]    -----  Chief Complaint   Patient presents with     Right Shoulder - Pain       SUBJECTIVE  Carlitos Hernandez is a/an 65 year old male who is seen as a self referral for evaluation of  R shoulder.     Started noticing R shoulder pain in 4/2021.  Would initially notice shoulder pain with exercise (dips).  Fly fishing as a hobby that could have aggravated his Sx and than made it worse.  Prior Hx of Thoracic outlet syndrome - 18 years ago - resolved with stretches    The patient is seen by themselves.  The patient is Right handed    Onset: 3 month(s) ago. Reports insidious onset without acute precipitating event.  Location of Pain: right anterior shoulder  Worsened by: dips, exercises, abduction  Better with: rest, modified exercise to avoid pain  Treatments tried: rest, stretches  Associated symptoms: numbness at night (potentially related to TOS)    Orthopedic/Surgical history: NO  Social History/Occupation: retired -       REVIEW OF SYSTEMS:    Do you have fever, chills, weight loss? No    Do you have any vision problems? No    Do you have any chest pain or edema? No    Do you have any shortness of breath or wheezing?  No    Do you have stomach problems? No    Do you have any numbness or focal weakness? Yes, R upper arm    Do you have diabetes? No    Do you have problems with  bleeding or clotting? No    Do you have an rashes or other skin lesions? Yes, developed sensitivity to tick bites,     OBJECTIVE:  There were no vitals taken for this visit.     EXAM:  Alert, pleasant and conversational    Neck with full AROM, non-tender to midline cervical and upper thoracic spine palpation, negative Spurling's.  Elbow with FROM and non-tender to palpation      right shoulder:   Skin intact. No skin changes, deformity or atrophy    AROM:   Forward Flexion: 180    Abduction: 180    External rotation: ~70    Internal rotation: T7.   symmetric ROM compared to uninjured side      Strength testing:   Abduction: 5/5,   External rotation: 5/5   Internal rotation 5/5     Deltoids 5/5, Biceps 5/5, Triceps 5/5,  5/5.    Palpation: negative TTP of the Acromioclavicular joint  negative TTP of Sternoclavicular joint  negative TTP of posterior glenoid  negative TTP of scapular borders  positive mild TTP of the bicipital tendon.     Special Tests: positive  Oakes test  positive  Neer's test.   negativeO'Anoop's test   negative Speed's test.    Neurovascularly intact bilateral upper extremities.      RADIOLOGY:    No imaging performed this visit

## 2021-07-02 NOTE — LETTER
7/2/2021      RE: Carlitos Hernandez  [address] [address]         [facility]  SPORTS & ORTHOPEDIC CLINIC VISIT     Jul 2, 2021        ASSESSMENT & PLAN    65-year-old male with rotator cuff tendinopathy of the right shoulder    Reviewed imaging and assessment with patient in detail  Discussed treatment with home exercises and provided with referral to physical therapy.  Okay to use as needed Tylenol or NSAIDs as needed for pain.  We discussed continued activity and activity modifications in detail.  If he is unimproved after 6 to 8 weeks you should follow-up in clinic for reevaluation.    Billy Camara MD  [facility]TS MEDICINE CL[facility] [place]    -----  Chief Complaint   Patient presents with     Right Shoulder - Pain       SUBJECTIVE  Carlitos Hernandez is a/an 65 year old male who is seen as a self referral for evaluation of  R shoulder.     Started noticing R shoulder pain in 4/2021.  Would initially notice shoulder pain with exercise (dips).  Fly fishing as a hobby that could have aggravated his Sx and than made it worse.  Prior Hx of Thoracic outlet syndrome - 18 years ago - resolved with stretches    The patient is seen by themselves.  The patient is Right handed    Onset: 3 month(s) ago. Reports insidious onset without acute precipitating event.  Location of Pain: right anterior shoulder  Worsened by: dips, exercises, abduction  Better with: rest, modified exercise to avoid pain  Treatments tried: rest, stretches  Associated symptoms: numbness at night (potentially related to TOS)    Orthopedic/Surgical history: NO  Social History/Occupation: retired -       REVIEW OF SYSTEMS:    Do you have fever, chills, weight loss? No    Do you have any vision problems? No    Do you have any chest pain or edema? No    Do you have any shortness of breath or wheezing?  No    Do you have stomach problems? No    Do you have any numbness or focal weakness?  Yes, R upper arm    Do you have diabetes? No    Do you have problems with bleeding or clotting? No    Do you have an rashes or other skin lesions? Yes, developed sensitivity to tick bites,     OBJECTIVE:  There were no vitals taken for this visit.     EXAM:  Alert, pleasant and conversational    Neck with full AROM, non-tender to midline cervical and upper thoracic spine palpation, negative Spurling's.  Elbow with FROM and non-tender to palpation      right shoulder:   Skin intact. No skin changes, deformity or atrophy    AROM:   Forward Flexion: 180    Abduction: 180    External rotation: ~70    Internal rotation: T7.   symmetric ROM compared to uninjured side      Strength testing:   Abduction: 5/5,   External rotation: 5/5   Internal rotation 5/5     Deltoids 5/5, Biceps 5/5, Triceps 5/5,  5/5.    Palpation: negative TTP of the Acromioclavicular joint  negative TTP of Sternoclavicular joint  negative TTP of posterior glenoid  negative TTP of scapular borders  positive mild TTP of the bicipital tendon.     Special Tests: positive  Oakes test  positive  Neer's test.   negativeO'Anoop's test   negative Speed's test.    Neurovascularly intact bilateral upper extremities.      RADIOLOGY:    No imaging performed this visit       Billy Camara MD

## 2021-07-03 NOTE — ADDENDUM NOTE
Addendum Note by Antonella Sparks RN at 7/16/2018  4:59 PM     Author: Antonella Sparks RN Service: -- Author Type: Registered Nurse    Filed: 7/16/2018  4:59 PM Encounter Date: 7/16/2018 Status: Signed    : Antonella Sparks RN (Registered Nurse)    Addended by: ANTONELLA SPARKS on: 7/16/2018 04:59 PM        Modules accepted: Orders

## 2021-08-04 ENCOUNTER — MYC MEDICAL ADVICE (OUTPATIENT)
Dept: INTERNAL MEDICINE | Facility: CLINIC | Age: 66
End: 2021-08-04

## 2021-08-04 DIAGNOSIS — L30.9 DERMATITIS: ICD-10-CM

## 2021-08-05 RX ORDER — TRIAMCINOLONE ACETONIDE 1 MG/G
CREAM TOPICAL 2 TIMES DAILY
Qty: 15 G | Refills: 1 | Status: SHIPPED | OUTPATIENT
Start: 2021-08-05 | End: 2021-09-27

## 2021-08-14 ENCOUNTER — HEALTH MAINTENANCE LETTER (OUTPATIENT)
Age: 66
End: 2021-08-14

## 2021-09-14 ENCOUNTER — MYC MEDICAL ADVICE (OUTPATIENT)
Dept: INTERNAL MEDICINE | Facility: CLINIC | Age: 66
End: 2021-09-14

## 2021-09-27 DIAGNOSIS — L30.9 DERMATITIS: ICD-10-CM

## 2021-09-27 RX ORDER — TRIAMCINOLONE ACETONIDE 1 MG/G
CREAM TOPICAL 2 TIMES DAILY
Qty: 15 G | Refills: 0 | Status: SHIPPED | OUTPATIENT
Start: 2021-09-27 | End: 2021-11-23

## 2021-10-09 ENCOUNTER — HEALTH MAINTENANCE LETTER (OUTPATIENT)
Age: 66
End: 2021-10-09

## 2021-10-19 ENCOUNTER — OFFICE VISIT (OUTPATIENT)
Dept: INTERNAL MEDICINE | Facility: CLINIC | Age: 66
End: 2021-10-19
Payer: COMMERCIAL

## 2021-10-19 VITALS
DIASTOLIC BLOOD PRESSURE: 82 MMHG | WEIGHT: 194 LBS | SYSTOLIC BLOOD PRESSURE: 137 MMHG | HEIGHT: 72 IN | RESPIRATION RATE: 16 BRPM | HEART RATE: 69 BPM | OXYGEN SATURATION: 95 % | BODY MASS INDEX: 26.28 KG/M2

## 2021-10-19 DIAGNOSIS — E78.5 HYPERLIPIDEMIA, UNSPECIFIED HYPERLIPIDEMIA TYPE: ICD-10-CM
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
## 2021-10-19 PROCEDURE — 90662 IIV NO PRSV INCREASED AG IM: CPT | Performed by: INTERNAL MEDICINE

## 2021-10-19 PROCEDURE — 90471 IMMUNIZATION ADMIN: CPT | Performed by: INTERNAL MEDICINE

## 2021-10-19 PROCEDURE — 99214 OFFICE O/P EST MOD 30 MIN: CPT | Mod: 25 | Performed by: INTERNAL MEDICINE

## 2021-10-19 RX ORDER — CETIRIZINE HYDROCHLORIDE 10 MG/1
10 TABLET ORAL DAILY PRN
Qty: 30 TABLET | Refills: 4 | Status: SHIPPED | OUTPATIENT
Start: 2021-10-19 | End: 2023-11-30

## 2021-10-19 RX ORDER — FLUOCINONIDE 0.5 MG/G
CREAM TOPICAL 3 TIMES DAILY
Qty: 60 G | Refills: 11 | Status: SHIPPED | OUTPATIENT
Start: 2021-10-19 | End: 2024-01-13

## 2021-10-19 ASSESSMENT — MIFFLIN-ST. JEOR: SCORE: 1687.98

## 2021-10-19 ASSESSMENT — PAIN SCALES - GENERAL: PAINLEVEL: NO PAIN (0)

## 2021-10-19 NOTE — NURSING NOTE
Carlitos Hernandez is a 66 year old male patient that presents today in clinic for the following:    Chief Complaint   Patient presents with     Derm Problem     Patient comes to check on skin issue, tic bites per the patient.      The patient's allergies and medications were reviewed as noted. A set of vitals were recorded as noted without incident. The patient does not have any other questions for the provider.    Isaac Bradford, EMT at 10:59 AM on 10/19/2021

## 2021-11-19 ENCOUNTER — LAB (OUTPATIENT)
Dept: LAB | Facility: CLINIC | Age: 66
End: 2021-11-19
Payer: COMMERCIAL

## 2021-11-19 DIAGNOSIS — E78.5 HYPERLIPIDEMIA, UNSPECIFIED HYPERLIPIDEMIA TYPE: ICD-10-CM

## 2021-11-19 LAB
CHOLEST SERPL-MCNC: 245 MG/DL
FASTING STATUS PATIENT QL REPORTED: YES
HDLC SERPL-MCNC: 49 MG/DL
LDLC SERPL CALC-MCNC: 159 MG/DL
TRIGL SERPL-MCNC: 184 MG/DL

## 2021-11-19 PROCEDURE — 36415 COLL VENOUS BLD VENIPUNCTURE: CPT

## 2021-11-19 PROCEDURE — 80061 LIPID PANEL: CPT

## 2021-11-23 DIAGNOSIS — E78.5 HYPERLIPIDEMIA, UNSPECIFIED HYPERLIPIDEMIA TYPE: ICD-10-CM

## 2021-11-23 RX ORDER — ROSUVASTATIN CALCIUM 5 MG/1
5 TABLET, COATED ORAL DAILY
Qty: 90 TABLET | Refills: 3 | Status: SHIPPED | OUTPATIENT
Start: 2021-11-23 | End: 2022-01-10

## 2022-01-07 ENCOUNTER — MYC MEDICAL ADVICE (OUTPATIENT)
Dept: INTERNAL MEDICINE | Facility: CLINIC | Age: 67
End: 2022-01-07
Payer: COMMERCIAL

## 2022-01-07 DIAGNOSIS — E78.5 HYPERLIPIDEMIA, UNSPECIFIED HYPERLIPIDEMIA TYPE: ICD-10-CM

## 2022-01-10 ENCOUNTER — TELEPHONE (OUTPATIENT)
Dept: INTERNAL MEDICINE | Facility: CLINIC | Age: 67
End: 2022-01-10
Payer: COMMERCIAL

## 2022-01-10 DIAGNOSIS — E78.5 HYPERLIPIDEMIA, UNSPECIFIED HYPERLIPIDEMIA TYPE: ICD-10-CM

## 2022-01-10 RX ORDER — ROSUVASTATIN CALCIUM 5 MG/1
5 TABLET, COATED ORAL DAILY
Qty: 90 TABLET | Refills: 3 | Status: CANCELLED | OUTPATIENT
Start: 2022-01-10

## 2022-01-10 RX ORDER — ROSUVASTATIN CALCIUM 5 MG/1
5 TABLET, COATED ORAL DAILY
Qty: 90 TABLET | Status: CANCELLED | OUTPATIENT
Start: 2022-01-10

## 2022-01-10 RX ORDER — ROSUVASTATIN CALCIUM 5 MG/1
5 TABLET, COATED ORAL DAILY
Qty: 90 TABLET | Refills: 3 | Status: SHIPPED | OUTPATIENT
Start: 2022-01-10 | End: 2022-11-22

## 2022-01-10 NOTE — TELEPHONE ENCOUNTER
[facility] Call Center    Phone Message    May a detailed message be left on voicemail: yes     Reason for Call: Medication Refill Request    Has the patient contacted the pharmacy for the refill? Yes   Name of medication being requested: rosuvastatin (CRESTOR) 5 MG tablet  Provider who prescribed the medication: Ovidio Franco MD  Pharmacy: [id number] [address] [facility] [address] [facility]  Date medication is needed: today    Patient had Rx changed to 5 milligrams everyday per Dr. Franco's [employer] message. Patient is now out as of yesterday 01/09/2022 and needs new Rx sent to pharmacy asap.      Action Taken: Message routed to:  [facility]    Travel Screening: Not Applicable

## 2022-01-10 NOTE — TELEPHONE ENCOUNTER
rosuvastatin (CRESTOR) 5 MG tablet  Last Written Prescription Date:  11/23/2021  Last Fill Quantity: 90,   # refills: 3  Last Office Visit : 10/19/2021  Future Office visit:  None  Routing refill request to provider for review/approval because:  Please send new updated order to pharmacy for Pt care.   There is 2 different orders in the same order.    Pt reporting, taking 5 Mg's daily.       Stacie Fajardo RN  Central Triage Red Flags/Med Refills

## 2022-01-10 NOTE — TELEPHONE ENCOUNTER
Health Call Center    Phone Message    May a detailed message be left on voicemail: yes     Reason for Call: Medication Question or concern regarding medication   Prescription Clarification  Name of Medication: rosuvastatin (CRESTOR) 5 MG tablet  Prescribing Provider:Ovidio Farnco MD   Pharmacy: [id number] [place] [address] [address] [address]   What on the order needs clarification? Patient was originally prescribed to take 5 milligrams every other day. Patient had a lipid profile completed this past fall which led Dr. Franco messaging him in [facility] that he should start taking meds 5 milligrams everyday. Since patient has been taking meds more often, he has run out of his meds. Meds need to be filled asap. Patient has been out for one day unable to take it 01/09/2022.    Patient is requesting the new Rx that states 5 milligrams everyday is sent to his pharmacy asap so he can get meds filled. Pharmacy told patient that they do not have the updated Rx on file and need the clinic to send it.          Action Taken: Message routed to:  [facility]    Travel Screening: Not Applicable

## 2022-01-10 NOTE — TELEPHONE ENCOUNTER
Per result note from PCP on 11/23/21 patient was to take rosuvastatin 5 mg daily. See result comment below from PCP:      Your cholesterol and triglycerides are both elevated with a high LDL (bad ) cholesterol level all of which have increased from before.   Your 10-year ASCVD risk score (Venkat ALCALA Jr., et al., 2013) is high at 19.9%    Values used to calculate the score:      Age: 66 years      Sex: Male      Is Non- : No      Diabetic: No      Tobacco smoker: No      Systolic Blood Pressure: 137 mmHg      Is BP treated: No      HDL Cholesterol: 49 mg/dL      Total Cholesterol: 245 mg/dL  You should increase the rosuvastatin to 5 mg daily.  You also need to exercise daily and watch the sugar and calories in the diet.  This should be rechecked in a year.  If you have any questions regarding this don't hesitate to contact us.   Written by Ovidio Franco MD on 11/23/2021 10:15 AM CST  Seen by patient Carlitos Hernandez on 11/23/2021 12:21 PM    New Rx was sent to pharmacy for patient.  Melanie Grossman LPN 1/10/2022 10:14 AM

## 2022-01-20 ENCOUNTER — OFFICE VISIT (OUTPATIENT)
Dept: DERMATOLOGY | Facility: CLINIC | Age: 67
End: 2022-01-20
Payer: COMMERCIAL

## 2022-01-20 DIAGNOSIS — L28.1 PRURIGO NODULARIS: ICD-10-CM

## 2022-01-20 DIAGNOSIS — D22.9 MULTIPLE BENIGN NEVI: ICD-10-CM

## 2022-01-20 DIAGNOSIS — L82.0 INFLAMED SEBORRHEIC KERATOSIS: Primary | ICD-10-CM

## 2022-01-20 DIAGNOSIS — D48.5 NEOPLASM OF UNCERTAIN BEHAVIOR OF SKIN: ICD-10-CM

## 2022-01-20 DIAGNOSIS — D18.01 CHERRY ANGIOMA: ICD-10-CM

## 2022-01-20 DIAGNOSIS — L82.1 SEBORRHEIC KERATOSIS: ICD-10-CM

## 2022-01-20 DIAGNOSIS — L81.4 SOLAR LENTIGO: ICD-10-CM

## 2022-01-20 PROCEDURE — 17110 DESTRUCTION B9 LES UP TO 14: CPT | Performed by: DERMATOLOGY

## 2022-01-20 PROCEDURE — 11102 TANGNTL BX SKIN SINGLE LES: CPT | Mod: XS | Performed by: DERMATOLOGY

## 2022-01-20 PROCEDURE — 11900 INJECT SKIN LESIONS </W 7: CPT | Mod: XS | Performed by: DERMATOLOGY

## 2022-01-20 PROCEDURE — 99203 OFFICE O/P NEW LOW 30 MIN: CPT | Mod: 25 | Performed by: DERMATOLOGY

## 2022-01-20 PROCEDURE — 88305 TISSUE EXAM BY PATHOLOGIST: CPT | Mod: 26 | Performed by: DERMATOLOGY

## 2022-01-20 PROCEDURE — 88305 TISSUE EXAM BY PATHOLOGIST: CPT | Mod: TC | Performed by: DERMATOLOGY

## 2022-01-20 ASSESSMENT — PAIN SCALES - GENERAL: PAINLEVEL: NO PAIN (0)

## 2022-01-20 NOTE — NURSING NOTE
Chief Complaint   Patient presents with     Derm Problem     Patient is here today for a skin exam.      Erica BRINK CMA

## 2022-01-20 NOTE — PROGRESS NOTES
[facility] Dermatology Note  Encounter Date: Jan 20, 2022  Office Visit     Dermatology Problem List:  1. 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 ISKs, s/p cryo  # NUB, right flank, s/p shave bx 1/20/2022   ____________________________________________    Assessment & Plan:    # Excoriated papules, left posterior thigh, consistent with prurigo nodularis. Less likely considered persistent arthropod bite reaction. Was noted to have some steroid atrophy in area of skin surrounding the lesions. Recommended ILK into central portion, but avoiding additional use of topical steroids at home to the surrounding area to prevent further atrophy of skin. Discussed topical calcineurin inhibitor as alternative, though patient deferred.   - ILK today, see procedure below  - Future: consider biopsy if not resolved with ILK    # NUB, right flank, biopsy today ddx irritated hemeangioma vs other  - Shave biopsy today, see procedure below    # Inflamed seborrheic keratoses, bothersome, the webbed space between 2nd and 3rd fingers on the left hand, left cheek, and left temporal scalp.  - Cryo today, see procedure below    # Benign lesions: Multiple benign nevi, solar lentigos, seborrheic keratoses, cherry angiomas. Explained to patient benign nature of lesion. No treatment is necessary at this time unless the lesion changes or becomes symptomatic.   - ABCDs of melanoma were discussed and self skin checks were advised.  - Sun precaution was advised including the use of sun screens of SPF 30 or higher, sun protective clothing, and avoidance of tanning beds.    Procedures Performed:     - Intra-lesional triamcinolone procedure note. After positioning and cleansing with isopropyl alcohol, see above total mL of triamcinolone 0.4 mg/mL was injected into 10 lesion(s) on the left posterior thigh. The patient tolerated the procedure well and left the dermatology clinic in good condition.    - Shave biopsy procedure note,  location(s): right flank. After discussion of benefits and risks including but not limited to bleeding, infection, scar, incomplete removal, recurrence, and non-diagnostic biopsy, written consent and photographs were obtained. The area was cleaned with isopropyl alcohol. 0.5mL of 1% lidocaine with epinephrine was injected to obtain adequate anesthesia of lesion(s). Shave biopsy at site(s) performed. Hemostasis was achieved with aluminium chloride. Petrolatum ointment and a sterile dressing were applied. The patient tolerated the procedure and no complications were noted. The patient was provided with verbal and written post care instructions.     - Cryotherapy procedure note, location(s): see above. After verbal consent and discussion of risks and benefits including, but not limited to, dyspigmentation/scar, blister, and pain, 3 lesion(s) was(were) treated with 1-2 mm freeze border for 1-2 cycles with liquid nitrogen. Post cryotherapy instructions were provided.    Follow-up: 4-6 week(s) in-person, or earlier for new or changing lesions    Staff and Scribe:     Scribe Disclosure:  I, Eric Douglass, am serving as a scribe to document services personally performed by Arash Ariza MD based on data collection and the provider's statements to me.     Provider Disclosure:   The documentation recorded by the scribe accurately reflects the services I personally performed and the decisions made by me.    Arash Ariza MD    Department of Dermatology  [facility] [place]: Phone: [phone], Fax:[phone]  [facility]: Phone: [phone] Fax: [phone]  ____________________________________________    CC: Derm Problem (Patient is here today for a skin exam. )    HPI:  Mr. Carlitos Hernandez is a(n) 66 year old male who presents today as a new patient for FBSE. Today, patient reports that he has some  spots around the swimsuit area that started as tick bites over the last 1-2 years but are still present on his skin. He previously treated with a topical steroid that worked one year, but he got a second round of spots and the steroid was not effective. Then he was prescribed fluocinonide, but he noticed darkening of the skin in the area, so he stopped using it. Denies history of rashes or eczema. Additionally, patient reports a number of spots that he would like examined. Denies personal or family history of skin cancer or history of tanning bed use.    Patient is otherwise feeling well, without additional skin concerns.    Labs Reviewed:  N/A    Physical Exam:  Vitals: There were no vitals taken for this visit.  SKIN: Total skin excluding the undergarment areas was performed. The exam included the head/face, neck, both arms, chest, back, abdomen, both legs, digits and/or nails.   - On the left posterior thigh, there were 2 excoriated pink papules with surrounding area of mild skin atrophy.  - On the right flank there was a 5-6 mm red, vascular papule.  - There are dome shaped bright red papules on the trunk and extremities.  - Multiple regular brown pigmented macules and papules are identified on the trunk and extremities.   - Scattered brown macules on sun exposed areas.  - There are waxy stuck on tan to brown papules on the trunk and extremities including on the webbed space between 2nd and 3rd fingers on the left hand, left cheek, and left temporal scalp.  - No other lesions of concern on areas examined.     Medications:  Current Outpatient Medications   Medication     cetirizine (ZYRTEC) 10 MG tablet     EPINEPHrine (ANY BX GENERIC EQUIV) 0.3 MG/0.3ML injection 2-pack     fluocinonide (LIDEX) 0.05 % external cream     glucosamine-chondroitin 500-400 MG CAPS per capsule     rosuvastatin (CRESTOR) 5 MG tablet     sildenafil (VIAGRA) 100 MG tablet     sildenafil (VIAGRA) 100 MG tablet     No current  facility-administered medications for this visit.      Past Medical History:   Patient Active Problem List   Diagnosis     Hyperlipemia     Hyperlipidemia, unspecified hyperlipidemia type     It band syndrome, right     Plantar fasciitis     Past Medical History:   Diagnosis Date     Hyperlipemia      It band syndrome, right 11/23/2016     Labile hypertension      Plantar fasciitis 2/19/2018

## 2022-01-20 NOTE — LETTER
1/20/2022       RE: Carlitos Hernandez  [address] [facility] [address]     Dear Colleague,    Thank you for referring your patient, Carlitos Hernandez, to the [facility] DERMATOLOGY [facility]INIC [place] at [facility]. Please see a copy of my visit note below.    [facility] Dermatology Note  Encounter Date: Jan 20, 2022  Office Visit     Dermatology Problem List:  1. 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 ISKs, s/p cryo  # NUB, right flank, s/p shave bx 1/20/2022   ____________________________________________    Assessment & Plan:    # Excoriated papules, left posterior thigh, consistent with prurigo nodularis. Less likely considered persistent arthropod bite reaction. Was noted to have some steroid atrophy in area of skin surrounding the lesions. Recommended ILK into central portion, but avoiding additional use of topical steroids at home to the surrounding area to prevent further atrophy of skin. Discussed topical calcineurin inhibitor as alternative, though patient deferred.   - ILK today, see procedure below  - Future: consider biopsy if not resolved with ILK    # NUB, right flank, biopsy today ddx irritated hemeangioma vs other  - Shave biopsy today, see procedure below    # Inflamed seborrheic keratoses, bothersome, the webbed space between 2nd and 3rd fingers on the left hand, left cheek, and left temporal scalp.  - Cryo today, see procedure below    # Benign lesions: Multiple benign nevi, solar lentigos, seborrheic keratoses, cherry angiomas. Explained to patient benign nature of lesion. No treatment is necessary at this time unless the lesion changes or becomes symptomatic.   - ABCDs of melanoma were discussed and self skin checks were advised.  - Sun precaution was advised including the use of sun screens of SPF 30 or higher, sun protective clothing, and avoidance of tanning beds.    Procedures Performed:     - Intra-lesional  triamcinolone procedure note. After positioning and cleansing with isopropyl alcohol, see above total mL of triamcinolone 0.4 mg/mL was injected into 10 lesion(s) on the left posterior thigh. The patient tolerated the procedure well and left the dermatology clinic in good condition.    - Shave biopsy procedure note, location(s): right flank. After discussion of benefits and risks including but not limited to bleeding, infection, scar, incomplete removal, recurrence, and non-diagnostic biopsy, written consent and photographs were obtained. The area was cleaned with isopropyl alcohol. 0.5mL of 1% lidocaine with epinephrine was injected to obtain adequate anesthesia of lesion(s). Shave biopsy at site(s) performed. Hemostasis was achieved with aluminium chloride. Petrolatum ointment and a sterile dressing were applied. The patient tolerated the procedure and no complications were noted. The patient was provided with verbal and written post care instructions.     - Cryotherapy procedure note, location(s): see above. After verbal consent and discussion of risks and benefits including, but not limited to, dyspigmentation/scar, blister, and pain, 3 lesion(s) was(were) treated with 1-2 mm freeze border for 1-2 cycles with liquid nitrogen. Post cryotherapy instructions were provided.    Follow-up: 4-6 week(s) in-person, or earlier for new or changing lesions    Staff and Scribe:     Scribe Disclosure:  PATRICIA, Eric Douglass, am serving as a scribe to document services personally performed by Arash Ariza MD based on data collection and the provider's statements to me.     Provider Disclosure:   The documentation recorded by the scribe accurately reflects the services I personally performed and the decisions made by me.    Arash Ariza MD    Department of Dermatology  [facility]: Phone: [phone], Fax:[phone]  [facility]  [facility]: Phone: [phone] Fax: [phone]  ____________________________________________    CC: Derm Problem (Patient is here today for a skin exam. )    HPI:  Mr. Carlitos Hernandez is a(n) 66 year old male who presents today as a new patient for FBSE. Today, patient reports that he has some spots around the swimsuit area that started as tick bites over the last 1-2 years but are still present on his skin. He previously treated with a topical steroid that worked one year, but he got a second round of spots and the steroid was not effective. Then he was prescribed fluocinonide, but he noticed darkening of the skin in the area, so he stopped using it. Denies history of rashes or eczema. Additionally, patient reports a number of spots that he would like examined. Denies personal or family history of skin cancer or history of tanning bed use.    Patient is otherwise feeling well, without additional skin concerns.    Labs Reviewed:  N/A    Physical Exam:  Vitals: There were no vitals taken for this visit.  SKIN: Total skin excluding the undergarment areas was performed. The exam included the head/face, neck, both arms, chest, back, abdomen, both legs, digits and/or nails.   - On the left posterior thigh, there were 2 excoriated pink papules with surrounding area of mild skin atrophy.  - On the right flank there was a 5-6 mm red, vascular papule.  - There are dome shaped bright red papules on the trunk and extremities.  - Multiple regular brown pigmented macules and papules are identified on the trunk and extremities.   - Scattered brown macules on sun exposed areas.  - There are waxy stuck on tan to brown papules on the trunk and extremities including on the webbed space between 2nd and 3rd fingers on the left hand, left cheek, and left temporal scalp.  - No other lesions of concern on areas examined.     Medications:  Current Outpatient Medications   Medication     cetirizine  (ZYRTEC) 10 MG tablet     EPINEPHrine (ANY BX GENERIC EQUIV) 0.3 MG/0.3ML injection 2-pack     fluocinonide (LIDEX) 0.05 % external cream     glucosamine-chondroitin 500-400 MG CAPS per capsule     rosuvastatin (CRESTOR) 5 MG tablet     sildenafil (VIAGRA) 100 MG tablet     sildenafil (VIAGRA) 100 MG tablet     No current facility-administered medications for this visit.      Past Medical History:   Patient Active Problem List   Diagnosis     Hyperlipemia     Hyperlipidemia, unspecified hyperlipidemia type     It band syndrome, right     Plantar fasciitis     Past Medical History:   Diagnosis Date     Hyperlipemia      It band syndrome, right 11/23/2016     Labile hypertension      Plantar fasciitis 2/19/2018

## 2022-01-24 LAB
PATH REPORT.COMMENTS IMP SPEC: NORMAL
PATH REPORT.COMMENTS IMP SPEC: NORMAL
PATH REPORT.FINAL DX SPEC: NORMAL
PATH REPORT.GROSS SPEC: NORMAL
PATH REPORT.MICROSCOPIC SPEC OTHER STN: NORMAL
PATH REPORT.RELEVANT HX SPEC: NORMAL

## 2022-02-10 NOTE — PROGRESS NOTES
Drug Administration Record    Prior to injection, verified patient identity using patient's name and date of birth.  Due to injection administration, patient instructed to remain in clinic for 15 minutes  afterwards, and to report any adverse reaction to me immediately.    Drug Name: triamcinolone acetonide(kenalog)  Dose: 0.4mL of triamcinolone 10mg/mL, 4mg dose  Route administered: ID  NDC #: Kenalog-10 (9009-0435-13)  Amount of waste(mL):4.6  Reason for waste: Multi dose vial    LOT #: [id number]  SITE: see note  : [facility]  EXPIRATION DATE: 4/2023

## 2022-02-24 ENCOUNTER — OFFICE VISIT (OUTPATIENT)
Dept: DERMATOLOGY | Facility: CLINIC | Age: 67
End: 2022-02-24
Payer: COMMERCIAL

## 2022-02-24 DIAGNOSIS — L82.0 INFLAMED SEBORRHEIC KERATOSIS: ICD-10-CM

## 2022-02-24 DIAGNOSIS — L82.1 SEBORRHEIC KERATOSIS: ICD-10-CM

## 2022-02-24 DIAGNOSIS — L28.1 PRURIGO NODULARIS: ICD-10-CM

## 2022-02-24 DIAGNOSIS — D18.01 CHERRY ANGIOMA: ICD-10-CM

## 2022-02-24 DIAGNOSIS — D18.09 HEMANGIOMA OF OTHER SITES: Primary | ICD-10-CM

## 2022-02-24 DIAGNOSIS — D22.9 MULTIPLE BENIGN NEVI: ICD-10-CM

## 2022-02-24 DIAGNOSIS — L81.4 SOLAR LENTIGO: ICD-10-CM

## 2022-02-24 PROCEDURE — 17110 DESTRUCTION B9 LES UP TO 14: CPT | Mod: GC | Performed by: DERMATOLOGY

## 2022-02-24 PROCEDURE — 99213 OFFICE O/P EST LOW 20 MIN: CPT | Mod: 25 | Performed by: DERMATOLOGY

## 2022-02-24 PROCEDURE — 11900 INJECT SKIN LESIONS </W 7: CPT | Mod: XS | Performed by: DERMATOLOGY

## 2022-02-24 ASSESSMENT — PAIN SCALES - GENERAL: PAINLEVEL: NO PAIN (0)

## 2022-02-24 NOTE — NURSING NOTE
Dermatology Rooming Note    Carlitos Hernandez's goals for this visit include:   Chief Complaint   Patient presents with     Derm Problem     follow up on Prurigo nodules/ ILK treatment     Grace Houston CMA on 2/24/2022 at 2:14 PM

## 2022-02-24 NOTE — NURSING NOTE
Drug Administration Record    Prior to injection, verified patient identity using patient's name and date of birth.  Due to injection administration, patient instructed to remain in clinic for 15 minutes  afterwards, and to report any adverse reaction to me immediately.    Drug Name: triamcinolone acetonide(kenalog)  Dose: 1mL of triamcinolone 10mg/mL, 10mg dose  Route administered: IM  NDC #: Kenalog-10 (3105-7204-60)  Amount of waste(mL):4ml  Reason for waste: Single use vial    LOT #: [id number]  SITE: see providers notes     EXPIRATION DATE: apr,2023

## 2022-02-24 NOTE — LETTER
2/24/2022       RE: Carlitos Hernandez  [address] [facility] [address]     Dear Colleague,    Thank you for referring your patient, Carlitos Hernandez, to the [facility] DERMATOLOGY CLINIC [place] at [facility]. Please see a copy of my visit note below.    [facility] Dermatology Note  Encounter Date: Feb 24, 2022  Office Visit     Dermatology Problem List:  1. 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 ISKs, s/p cryo  3. Benign biopsies  - hemangioma, right flank, s/p shave bx 1/20/2022   ____________________________________________    Assessment & Plan:    # Excoriated papule, right anterior thigh, consistent with prurigo nodularis.  - ILK today, see procedure below  - Future: consider biopsy if not resolved with ILK    # Inflamed seborrheic keratosis  - Cryotherapy, see procedure below    # Hemangioma, irritated, right flank. Acute, uncomplicated.   - Shave biopsy at last visit but with recurrence  - Recommended punch biopsy vs monitoring; patient elects to monitor for progression. Discussed risks/benefits of procedure and patient expressed understanding    #Benign lesions: Multiple benign nevi, solar lentigos, seborrheic keratoses, cherry angiomas. Explained to patient benign nature of lesion. No treatment is necessary at this time unless the lesion changes or becomes symptomatic.   - ABCDs of melanoma were discussed and self skin checks were advised.  - Sun precaution was advised including the use of sun screens of SPF 30 or higher, sun protective clothing, and avoidance of tanning beds.    Procedures Performed:   - Intra-lesional triamcinolone procedure note. After positioning and cleansing with isopropyl alcohol, 0.2 total mL of triamcinolone 10 mg/mL was injected into 1 lesion(s) on the right thigh. The patient tolerated the procedure well and left the dermatology clinic in good condition.  - Cryotherapy procedure note, location(s):  inter-digital between 2nd and 3rd finger on the right. After verbal consent and discussion of risks and benefits including, but not limited to, dyspigmentation/scar, blister, and pain, 2 lesion(s) was(were) treated with 1-2 mm freeze border for 1-2 cycles with liquid nitrogen. Post cryotherapy instructions were provided.    Follow-up: prn for new or changing lesions    Staff and Scribe:   Mak Mac PGY2 [facility]    Scribe Disclosure:  I, Nasreen Streeter, am serving as a scribe to document services personally performed by Arash Ariza MD based on data collection and the provider's statements to me.     Provider Disclosure:   The documentation recorded by the scribe accurately reflects the services I personally performed and the decisions made by me.    Staff Physician Comments:   I saw and evaluated the patient with the resident and I agree with the assessment and plan.  I was present for the entire minor procedure and examination.    Arash Ariza MD  Pronouns: he/him/his    Department of Dermatology  [facility]: Phone: [phone], Fax:[phone]  [facility]: Phone: [phone] Fax: [phone]  ____________________________________________    CC: Derm Problem (follow up on Prurigo nodules/ ILK treatment)    HPI:  Mr. Carlitos Hernandez is a(n) 66 year old male who presents today as a return patient for prurigo nodularis, cherry hemangioma and inflamed seborrheic keratosis. Last seen in dermatology on 1/20/22 by myself at which point excoriated papules on the L posterior thigh were treated with ILK. A biopsied lesion on the R flank returned as a hemangioma. Inflammed seborrheic keratosis were treated with cryotherapy.  - Prurigo nodularis previously treated with ILK have resolved  - New nodule on her anterior right thigh similar to previous prurigo nodularis  - Hemangioma on the right trunk  which was shaved has returned but smaller  - New inflamed seborrheic keratosis in between his fingers on the right that are irritating.    Patient is otherwise feeling well, without additional skin concerns.    Labs Reviewed:  N/A    Physical Exam:  Vitals: There were no vitals taken for this visit.  SKIN: Focused examination of legs, trunk, face was performed.  - On the right anterior thigh, there is 1 excoriated pink papules.  - On the right flank there was a 6 mm red, vascular papule.  - Multiple regular brown pigmented macules and papules are identified on the trunk and extremities.   - Scattered brown macules on sun exposed areas.  - There are waxy stuck on tan to brown papules on the trunk and extremities including on the webbed space between 2nd and 3rd fingers on the left hand, left cheek, and left temporal scalp.  - No other lesions of concern on areas examined.     Medications:  Current Outpatient Medications   Medication     cetirizine (ZYRTEC) 10 MG tablet     EPINEPHrine (ANY BX GENERIC EQUIV) 0.3 MG/0.3ML injection 2-pack     fluocinonide (LIDEX) 0.05 % external cream     glucosamine-chondroitin 500-400 MG CAPS per capsule     rosuvastatin (CRESTOR) 5 MG tablet     sildenafil (VIAGRA) 100 MG tablet     sildenafil (VIAGRA) 100 MG tablet     Current Facility-Administered Medications   Medication     triamcinolone acetonide (KENALOG-10) injection 4 mg      Past Medical History:   Patient Active Problem List   Diagnosis     Hyperlipemia     Hyperlipidemia, unspecified hyperlipidemia type     It band syndrome, right     Plantar fasciitis     Past Medical History:   Diagnosis Date     Hyperlipemia      It band syndrome, right 11/23/2016     Labile hypertension      Plantar fasciitis 2/19/2018        CC Ovidio Franco MD  [address] [place] [address] on close of this encounter.

## 2022-02-24 NOTE — PROGRESS NOTES
[facility] Dermatology Note  Encounter Date: Feb 24, 2022  Office Visit     Dermatology Problem List:  1. 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 ISKs, s/p cryo  3. Benign biopsies  - hemangioma, right flank, s/p shave bx 1/20/2022   ____________________________________________    Assessment & Plan:    # Excoriated papule, right anterior thigh, consistent with prurigo nodularis.  - ILK today, see procedure below  - Future: consider biopsy if not resolved with ILK    # Inflamed seborrheic keratosis  - Cryotherapy, see procedure below    # Hemangioma, irritated, right flank. Acute, uncomplicated.   - Shave biopsy at last visit but with recurrence  - Recommended punch biopsy vs monitoring; patient elects to monitor for progression. Discussed risks/benefits of procedure and patient expressed understanding    #Benign lesions: Multiple benign nevi, solar lentigos, seborrheic keratoses, cherry angiomas. Explained to patient benign nature of lesion. No treatment is necessary at this time unless the lesion changes or becomes symptomatic.   - ABCDs of melanoma were discussed and self skin checks were advised.  - Sun precaution was advised including the use of sun screens of SPF 30 or higher, sun protective clothing, and avoidance of tanning beds.    Procedures Performed:   - Intra-lesional triamcinolone procedure note. After positioning and cleansing with isopropyl alcohol, 0.2 total mL of triamcinolone 10 mg/mL was injected into 1 lesion(s) on the right thigh. The patient tolerated the procedure well and left the dermatology clinic in good condition.  - Cryotherapy procedure note, location(s): inter-digital between 2nd and 3rd finger on the right. After verbal consent and discussion of risks and benefits including, but not limited to, dyspigmentation/scar, blister, and pain, 2 lesion(s) was(were) treated with 1-2 mm freeze border for 1-2 cycles with liquid nitrogen. Post cryotherapy instructions were  provided.    Follow-up: prn for new or changing lesions    Staff and Scribe:   Mak Mac PGY2 [facility]    Scribe Disclosure:  I, Nasreen Streeter, am serving as a scribe to document services personally performed by Arash Ariza MD based on data collection and the provider's statements to me.     Provider Disclosure:   The documentation recorded by the scribe accurately reflects the services I personally performed and the decisions made by me.    Staff Physician Comments:   I saw and evaluated the patient with the resident and I agree with the assessment and plan.  I was present for the entire minor procedure and examination.    Arash Ariza MD  Pronouns: he/him/his    Department of Dermatology  [facility]: Phone: [phone], Fax:[phone]  [facility]: Phone: [phone] Fax: [phone]  ____________________________________________    CC: Derm Problem (follow up on Prurigo nodules/ ILK treatment)    HPI:  Mr. Carlitos Hernandez is a(n) 66 year old male who presents today as a return patient for prurigo nodularis, cherry hemangioma and inflamed seborrheic keratosis. Last seen in dermatology on 1/20/22 by myself at which point excoriated papules on the L posterior thigh were treated with ILK. A biopsied lesion on the R flank returned as a hemangioma. Inflammed seborrheic keratosis were treated with cryotherapy.  - Prurigo nodularis previously treated with ILK have resolved  - New nodule on her anterior right thigh similar to previous prurigo nodularis  - Hemangioma on the right trunk which was shaved has returned but smaller  - New inflamed seborrheic keratosis in between his fingers on the right that are irritating.    Patient is otherwise feeling well, without additional skin concerns.    Labs Reviewed:  N/A    Physical Exam:  Vitals: There were no vitals taken for this visit.  SKIN:  Focused examination of legs, trunk, face was performed.  - On the right anterior thigh, there is 1 excoriated pink papules.  - On the right flank there was a 6 mm red, vascular papule.  - Multiple regular brown pigmented macules and papules are identified on the trunk and extremities.   - Scattered brown macules on sun exposed areas.  - There are waxy stuck on tan to brown papules on the trunk and extremities including on the webbed space between 2nd and 3rd fingers on the left hand, left cheek, and left temporal scalp.  - No other lesions of concern on areas examined.     Medications:  Current Outpatient Medications   Medication     cetirizine (ZYRTEC) 10 MG tablet     EPINEPHrine (ANY BX GENERIC EQUIV) 0.3 MG/0.3ML injection 2-pack     fluocinonide (LIDEX) 0.05 % external cream     glucosamine-chondroitin 500-400 MG CAPS per capsule     rosuvastatin (CRESTOR) 5 MG tablet     sildenafil (VIAGRA) 100 MG tablet     sildenafil (VIAGRA) 100 MG tablet     Current Facility-Administered Medications   Medication     triamcinolone acetonide (KENALOG-10) injection 4 mg      Past Medical History:   Patient Active Problem List   Diagnosis     Hyperlipemia     Hyperlipidemia, unspecified hyperlipidemia type     It band syndrome, right     Plantar fasciitis     Past Medical History:   Diagnosis Date     Hyperlipemia      It band syndrome, right 11/23/2016     Labile hypertension      Plantar fasciitis 2/19/2018        CC Ovidio Franco MD  [address] [place] [address] on close of this encounter.

## 2022-04-08 ENCOUNTER — OFFICE VISIT (OUTPATIENT)
Dept: OPHTHALMOLOGY | Facility: CLINIC | Age: 67
End: 2022-04-08
Attending: OPTOMETRIST
Payer: COMMERCIAL

## 2022-04-08 DIAGNOSIS — H35.372 EPIRETINAL MEMBRANE, LEFT EYE: ICD-10-CM

## 2022-04-08 DIAGNOSIS — H52.13 MYOPIA OF BOTH EYES WITH REGULAR ASTIGMATISM: ICD-10-CM

## 2022-04-08 DIAGNOSIS — H35.372 EPIRETINAL MEMBRANE, LEFT EYE: Primary | ICD-10-CM

## 2022-04-08 DIAGNOSIS — H52.223 MYOPIA OF BOTH EYES WITH REGULAR ASTIGMATISM: ICD-10-CM

## 2022-04-08 DIAGNOSIS — H43.813 PVD (POSTERIOR VITREOUS DETACHMENT), BILATERAL: ICD-10-CM

## 2022-04-08 PROCEDURE — 92004 COMPRE OPH EXAM NEW PT 1/>: CPT | Performed by: OPTOMETRIST

## 2022-04-08 PROCEDURE — G0463 HOSPITAL OUTPT CLINIC VISIT: HCPCS | Mod: 25

## 2022-04-08 PROCEDURE — 92015 DETERMINE REFRACTIVE STATE: CPT

## 2022-04-08 ASSESSMENT — VISUAL ACUITY
OD_CC: 20/20
METHOD: SNELLEN - LINEAR
OD_CC+: -2
METHOD_MR_RETINOSCOPY: 1
OS_CC: 20/20
CORRECTION_TYPE: GLASSES

## 2022-04-08 ASSESSMENT — CONF VISUAL FIELD
OD_NORMAL: 1
METHOD: COUNTING FINGERS
OS_NORMAL: 1

## 2022-04-08 ASSESSMENT — REFRACTION_WEARINGRX
OD_SPHERE: -2.00
OS_ADD: +2.50
OD_AXIS: 055
OS_SPHERE: -2.00
OS_AXIS: 115
OD_ADD: +2.50
SPECS_TYPE: PAL
OS_CYLINDER: +0.25
OD_CYLINDER: +0.75

## 2022-04-08 ASSESSMENT — SLIT LAMP EXAM - LIDS
COMMENTS: NORMAL
COMMENTS: NORMAL

## 2022-04-08 ASSESSMENT — REFRACTION_MANIFEST
OS_ADD: +2.50
OD_AXIS: 045
OD_ADD: +2.50
OS_AXIS: 115
OD_SPHERE: -1.75
OS_SPHERE: -2.00
OS_CYLINDER: +0.50
OD_CYLINDER: +0.75

## 2022-04-08 ASSESSMENT — TONOMETRY
OD_IOP_MMHG: 14
OS_IOP_MMHG: 16
IOP_METHOD: TONOPEN

## 2022-04-08 ASSESSMENT — EXTERNAL EXAM - LEFT EYE: OS_EXAM: NORMAL

## 2022-04-08 ASSESSMENT — EXTERNAL EXAM - RIGHT EYE: OD_EXAM: NORMAL

## 2022-04-08 NOTE — PROGRESS NOTES
Assessment & Plan       Carlitos Hernandez is a 66 year old male with the following diagnoses:   1. Epiretinal membrane, left eye - Left Eye    2. PVD (posterior vitreous detachment), bilateral - Both Eyes    3. Myopia of both eyes with regular astigmatism    4. Epiretinal membrane, left eye           New prescription for glasses   ERM follow as well as PVD and cataracts yearly exam     Patient disposition:   No follow-ups on file.          Complete documentation of historical and exam elements from today's encounter can be found in the full encounter summary report (not reduplicated in this progress note). I personally obtained the chief complaint(s) and history of present illness.  I confirmed and edited as necessary the review of systems, past medical/surgical history, family history, social history, and examination findings as documented by others; and I examined the patient myself. I personally reviewed the relevant tests, images, and reports as documented above. I formulated and edited as necessary the assessment and plan and discussed the findings and management plan with the patient and family.  Dr. Antoni Love

## 2022-04-08 NOTE — NURSING NOTE
Chief Complaints and History of Present Illnesses   Patient presents with     Annual Eye Exam     Chief Complaint(s) and History of Present Illness(es)     Annual Eye Exam     Pain scale: 0/10              Comments     Pt C/o floaters BE for many years, seems worse over the last few year.   No flashes, redness or eye pain    Jeanna Tamez COT 9:11 AM April 8, 2022

## 2022-06-17 ENCOUNTER — MYC MEDICAL ADVICE (OUTPATIENT)
Dept: INTERNAL MEDICINE | Facility: CLINIC | Age: 67
End: 2022-06-17
Payer: COMMERCIAL

## 2022-06-18 ENCOUNTER — OFFICE VISIT (OUTPATIENT)
Dept: FAMILY MEDICINE | Facility: CLINIC | Age: 67
End: 2022-06-18
Payer: COMMERCIAL

## 2022-06-18 VITALS
OXYGEN SATURATION: 96 % | DIASTOLIC BLOOD PRESSURE: 91 MMHG | WEIGHT: 193 LBS | TEMPERATURE: 98.2 F | SYSTOLIC BLOOD PRESSURE: 147 MMHG | RESPIRATION RATE: 16 BRPM | BODY MASS INDEX: 26.18 KG/M2 | HEART RATE: 66 BPM

## 2022-06-18 DIAGNOSIS — H81.10 BENIGN PAROXYSMAL POSITIONAL VERTIGO, UNSPECIFIED LATERALITY: Primary | ICD-10-CM

## 2022-06-18 PROCEDURE — 99214 OFFICE O/P EST MOD 30 MIN: CPT | Performed by: PHYSICIAN ASSISTANT

## 2022-06-18 RX ORDER — MECLIZINE HYDROCHLORIDE 25 MG/1
25 TABLET ORAL 3 TIMES DAILY PRN
Qty: 15 TABLET | Refills: 0 | Status: SHIPPED | OUTPATIENT
Start: 2022-06-18 | End: 2022-06-23

## 2022-06-18 NOTE — PATIENT INSTRUCTIONS
Take the meclizine as needed  Follow-up with your primary care provider for reevaluation treatment if new symptoms or concerns arise per

## 2022-06-18 NOTE — PROGRESS NOTES
Patient presents with:  Nausea: On Thursday afternoon dizziness and nausea. Patient had same thing happen 4 years ago when he had an ear infection.      Clinical Decision Making:  I had a long conversation with the patient stating that he had a paroxysmal episode of dizziness.  Patient was cutting the grass and did not happen when he was exerting himself and performing the activity.  Rather it happened after he was inside and resting.  There was question if he was dehydrated.  Patient had episode of dizziness that was made worse with change of position.  He has not had recurrence of the symptoms.  Patient will be treated with meclizine for BPPV.  He will monitor for other symptoms.  Multiple etiologies and diagnoses were considered to include cardiac causes but he did not have other cardiac symptoms.  His blood pressure was elevated.  He is not had previous history of hypertension and is not currently treated for hypertension.  He is also not had recent evaluation by [facility].  Indication for emergent return was gone over.  Patient will follow up with his primary care provider next week to follow up with his symptoms and review his elevated blood pressure.  Indication for emergent return to the ER was gone over questions were answered to patient satisfaction before discharge.      ICD-10-CM    1. Benign paroxysmal positional vertigo, unspecified laterality  H81.10 meclizine (ANTIVERT) 25 MG tablet       Patient Instructions   Take the meclizine as needed  Follow-up with your primary care provider for reevaluation treatment if new symptoms or concerns arise per          HPI:  Carlitos Hernandez is a 66 year old male who presents today for 2 bruxism's of dizziness that was made worse with change of position.  He has had some nauseousness at the time of the event but no continued nausea and no vomiting.  No associated diaphoresis chest arm or jaw pain shortness of breath syncope presyncope pleuritic chest pain  headache vision disturbances or paresthesias or weakness.  No other symptoms of TIA or CVA.  Patient states that standing made the symptoms worse and lying is the best.  Change of position and moving caused him to have the vertigo type symptoms.  Patient does not admit to alcohol use and he has not had increase in salt/sodium.    Cardiovascular risk factors are reviewed with the patient and are negative.  Cardiovascular Risk factors:    Male, age 66  Negative Hypertension  Negative hyperlipidemia  Negative diabetes mellitus  Negative obesity  Negative sedentary lifestyle  Negative family history,   Negative smoker  Negative alcohol use  Negative illicit street drug use    History obtained from chart review and the patient.    Problem List:  2018-02: Plantar fasciitis  2016-11: Hyperlipidemia, unspecified hyperlipidemia type  2016-11: It band syndrome, right  Hyperlipemia      Past Medical History:   Diagnosis Date     Hyperlipemia      It band syndrome, right 11/23/2016     Labile hypertension      Plantar fasciitis 2/19/2018       Social History     Tobacco Use     Smoking status: Never Smoker     Smokeless tobacco: Never Used   Substance Use Topics     Alcohol use: Yes     Alcohol/week: 7.0 standard drinks     Types: 7 Standard drinks or equivalent per week       Review of Systems  As above in HPI otherwise negative.    Vitals:    06/18/22 1515   BP: (!) 147/91   Pulse: 66   Resp: 16   Temp: 98.2  F (36.8  C)   TempSrc: Oral   SpO2: 96%   Weight: 87.5 kg (193 lb)       General: Patient is resting comfortably no acute distress is afebrile  HEENT: Head is normocephalic atraumatic   eyes are PERRL EOMI sclera anicteric   TMs are clear bilaterally  Throat is with mild pharyngeal wall erythema and no exudate  No cervical lymphadenopathy present  LUNGS: Clear to auscultation bilaterally  HEART: Regular rate and rhythm  Skin: Without rash non-diaphoretic  Neuro: Cranial nerves II through XII are grossly intact.  Patient  has good balance is able to arise from a seated position walk and talk and get onto the examination table.  No focal neurologic deficits noted with finger-to-nose and heel-to-shin testing intact patient had both direct and consensual stimulation to light and no noted nystagmus.  He was alert and oriented.     Physical Exam    At the end of the encounter, I discussed results, diagnosis, medications. Discussed red flags for immediate return to clinic/ER, as well as indications for follow up if no improvement. Patient understood and agreed to plan. Patient was stable for discharge.

## 2022-07-13 ENCOUNTER — OFFICE VISIT (OUTPATIENT)
Dept: INTERNAL MEDICINE | Facility: CLINIC | Age: 67
End: 2022-07-13
Payer: COMMERCIAL

## 2022-07-13 ENCOUNTER — LAB (OUTPATIENT)
Dept: LAB | Facility: CLINIC | Age: 67
End: 2022-07-13
Payer: COMMERCIAL

## 2022-07-13 VITALS
BODY MASS INDEX: 26.24 KG/M2 | WEIGHT: 193.7 LBS | RESPIRATION RATE: 16 BRPM | HEART RATE: 77 BPM | HEIGHT: 72 IN | SYSTOLIC BLOOD PRESSURE: 130 MMHG | DIASTOLIC BLOOD PRESSURE: 82 MMHG | OXYGEN SATURATION: 94 %

## 2022-07-13 DIAGNOSIS — H92.01 RIGHT EAR PAIN: ICD-10-CM

## 2022-07-13 DIAGNOSIS — R42 DIZZINESS: Primary | ICD-10-CM

## 2022-07-13 DIAGNOSIS — R42 DIZZINESS: ICD-10-CM

## 2022-07-13 LAB
ALBUMIN SERPL-MCNC: 4 G/DL (ref 3.4–5)
ALP SERPL-CCNC: 73 U/L (ref 40–150)
ALT SERPL W P-5'-P-CCNC: 35 U/L (ref 0–70)
ANION GAP SERPL CALCULATED.3IONS-SCNC: 9 MMOL/L (ref 3–14)
AST SERPL W P-5'-P-CCNC: 27 U/L (ref 0–45)
BASOPHILS # BLD AUTO: 0 10E3/UL (ref 0–0.2)
BASOPHILS NFR BLD AUTO: 1 %
BILIRUB SERPL-MCNC: 0.6 MG/DL (ref 0.2–1.3)
BUN SERPL-MCNC: 21 MG/DL (ref 7–30)
CALCIUM SERPL-MCNC: 9.1 MG/DL (ref 8.5–10.1)
CHLORIDE BLD-SCNC: 109 MMOL/L (ref 94–109)
CO2 SERPL-SCNC: 26 MMOL/L (ref 20–32)
CREAT SERPL-MCNC: 0.92 MG/DL (ref 0.66–1.25)
CRP SERPL-MCNC: <2.9 MG/L (ref 0–8)
EOSINOPHIL # BLD AUTO: 0.1 10E3/UL (ref 0–0.7)
EOSINOPHIL NFR BLD AUTO: 2 %
ERYTHROCYTE [DISTWIDTH] IN BLOOD BY AUTOMATED COUNT: 11.8 % (ref 10–15)
ERYTHROCYTE [SEDIMENTATION RATE] IN BLOOD BY WESTERGREN METHOD: 7 MM/HR (ref 0–20)
GFR SERPL CREATININE-BSD FRML MDRD: >90 ML/MIN/1.73M2
GLUCOSE BLD-MCNC: 78 MG/DL (ref 70–99)
HCT VFR BLD AUTO: 42 % (ref 40–53)
HGB BLD-MCNC: 14.8 G/DL (ref 13.3–17.7)
IMM GRANULOCYTES # BLD: 0 10E3/UL
IMM GRANULOCYTES NFR BLD: 0 %
LYMPHOCYTES # BLD AUTO: 2.8 10E3/UL (ref 0.8–5.3)
LYMPHOCYTES NFR BLD AUTO: 48 %
MCH RBC QN AUTO: 31.5 PG (ref 26.5–33)
MCHC RBC AUTO-ENTMCNC: 35.2 G/DL (ref 31.5–36.5)
MCV RBC AUTO: 89 FL (ref 78–100)
MONOCYTES # BLD AUTO: 0.6 10E3/UL (ref 0–1.3)
MONOCYTES NFR BLD AUTO: 9 %
NEUTROPHILS # BLD AUTO: 2.4 10E3/UL (ref 1.6–8.3)
NEUTROPHILS NFR BLD AUTO: 40 %
NRBC # BLD AUTO: 0 10E3/UL
NRBC BLD AUTO-RTO: 0 /100
PLATELET # BLD AUTO: 198 10E3/UL (ref 150–450)
POTASSIUM BLD-SCNC: 3.8 MMOL/L (ref 3.4–5.3)
PROT SERPL-MCNC: 7.4 G/DL (ref 6.8–8.8)
RBC # BLD AUTO: 4.7 10E6/UL (ref 4.4–5.9)
SODIUM SERPL-SCNC: 144 MMOL/L (ref 133–144)
TSH SERPL DL<=0.005 MIU/L-ACNC: 2.4 MU/L (ref 0.4–4)
WBC # BLD AUTO: 6 10E3/UL (ref 4–11)

## 2022-07-13 PROCEDURE — 80053 COMPREHEN METABOLIC PANEL: CPT | Performed by: PATHOLOGY

## 2022-07-13 PROCEDURE — 99215 OFFICE O/P EST HI 40 MIN: CPT | Performed by: INTERNAL MEDICINE

## 2022-07-13 PROCEDURE — 85652 RBC SED RATE AUTOMATED: CPT | Performed by: PATHOLOGY

## 2022-07-13 PROCEDURE — 36415 COLL VENOUS BLD VENIPUNCTURE: CPT | Performed by: PATHOLOGY

## 2022-07-13 PROCEDURE — 86140 C-REACTIVE PROTEIN: CPT | Performed by: PATHOLOGY

## 2022-07-13 PROCEDURE — 85025 COMPLETE CBC W/AUTO DIFF WBC: CPT | Performed by: PATHOLOGY

## 2022-07-13 PROCEDURE — 84443 ASSAY THYROID STIM HORMONE: CPT | Performed by: PATHOLOGY

## 2022-07-13 NOTE — PROGRESS NOTES
HPI  66-year-old presents today for a couple of issues.  He reports that on 3 occasions approximately about every 2 weeks over the last couple months he has had a subconjunctival hemorrhage in the left inner eye.  No preceding trauma no other bleeding symptoms such as epistaxis bleeding gums hematuria hematochezia bruising or other symptoms at all.  He also is here in follow-up from a visit to urgent care the end of June.  This was precipitated by lawnmowing followed by a period of dizziness and unsteadiness that was severe immediately thereafter and then persisted for a day or 2 precipitating the urgent care visit.  He absolutely denies any vertigo and reports more of an unsteady imbalance type of symptoms.  He is felt to have benign positional vertigo and was treated with meclizine.  Subsequently the dizziness has subsided back to baseline although he has had some point tenderness 1 out of 10 in severity at the base of his right ear.  He is particularly sensitive about these issues as 4 years ago he presented with inner ear infection severe nausea dizziness some hearing loss and an MRI scan was found to have a aneurysm.  Presently no headache or focal neurologic symptoms.  Past Medical History:   Diagnosis Date     Hyperlipemia      It band syndrome, right 11/23/2016     Labile hypertension      Plantar fasciitis 2/19/2018     Past Surgical History:   Procedure Laterality Date     CRANIOTOMY, REPAIR ANEURYSM, COMBINED Right 8/9/2018    Procedure:  RIGHT CRANIOTOMY AND CLIPPING OF RIGHT CAROTID TERMINUS ANEURYSM, COMPLEX INTRAOPERATIVE ANGIOGRAMS;  Surgeon: Tigist Davidson MD;  Location: [facility];  Service:      IR CEREBRAL ANGIOGRAM  8/9/2018     rt craaniotomy for anerysm clipping  08/2018     thyroglossal duct cystectomy       THYROID SURGERY       TONSILLECTOMY       VASECTOMY       Family History   Problem Relation Age of Onset     Coronary Artery Disease Maternal Grandmother      Other Cancer  "Father         liver     Hypertension Mother      Asthma Sister      Diabetes No family hx of      Glaucoma No family hx of      Macular Degeneration No family hx of          Exam:  /82 (BP Location: Right arm, Patient Position: Sitting, Cuff Size: Adult Regular)   Pulse 77   Resp 16   Ht 1.816 m (5' 11.5\")   Wt 87.9 kg (193 lb 11.2 oz)   SpO2 94%   BMI 26.64 kg/m    193 lbs 11.2 oz  Physical Exam   The patient is alert, oriented with a clear sensorium.   Skin shows no lesions or rashes and good turgor.   Head is normocephalic and atraumatic.   Eyes show PERRLA with benign optic fundi.   Ears show normal TMs bilaterally.   Mouth shows clear oral mucosa.   Neck shows no nodes, thyromegaly or bruits.   Back is non tender.  Lungs are clear to  auscultation.   Heart shows normal S1 and S2 without murmur or gallop.    Extremities show no edema and no evidence of active synovitis.   Neurologic examination shows cranial nerves II-XII intact. Motor shows 5/5 strength. Reflexes are symmetric. Cerebellar testing shows normal tandem gait.  Romberg negative.  Labs reviewed:  Results for orders placed or performed in visit on 07/13/22   Comprehensive metabolic panel     Status: Normal   Result Value Ref Range    Sodium 144 133 - 144 mmol/L    Potassium 3.8 3.4 - 5.3 mmol/L    Chloride 109 94 - 109 mmol/L    Carbon Dioxide (CO2) 26 20 - 32 mmol/L    Anion Gap 9 3 - 14 mmol/L    Urea Nitrogen 21 7 - 30 mg/dL    Creatinine 0.92 0.66 - 1.25 mg/dL    Calcium 9.1 8.5 - 10.1 mg/dL    Glucose 78 70 - 99 mg/dL    Alkaline Phosphatase 73 40 - 150 U/L    AST 27 0 - 45 U/L    ALT 35 0 - 70 U/L    Protein Total 7.4 6.8 - 8.8 g/dL    Albumin 4.0 3.4 - 5.0 g/dL    Bilirubin Total 0.6 0.2 - 1.3 mg/dL    GFR Estimate >90 >60 mL/min/1.73m2   Erythrocyte sedimentation rate auto     Status: Normal   Result Value Ref Range    Erythrocyte Sedimentation Rate 7 0 - 20 mm/hr   CRP inflammation     Status: Normal   Result Value Ref Range    " CRP Inflammation <2.9 0.0 - 8.0 mg/L   TSH with free T4 reflex     Status: Normal   Result Value Ref Range    TSH 2.40 0.40 - 4.00 mU/L   CBC with platelets and differential     Status: None   Result Value Ref Range    WBC Count 6.0 4.0 - 11.0 10e3/uL    RBC Count 4.70 4.40 - 5.90 10e6/uL    Hemoglobin 14.8 13.3 - 17.7 g/dL    Hematocrit 42.0 40.0 - 53.0 %    MCV 89 78 - 100 fL    MCH 31.5 26.5 - 33.0 pg    MCHC 35.2 31.5 - 36.5 g/dL    RDW 11.8 10.0 - 15.0 %    Platelet Count 198 150 - 450 10e3/uL    % Neutrophils 40 %    % Lymphocytes 48 %    % Monocytes 9 %    % Eosinophils 2 %    % Basophils 1 %    % Immature Granulocytes 0 %    NRBCs per 100 WBC 0 <1 /100    Absolute Neutrophils 2.4 1.6 - 8.3 10e3/uL    Absolute Lymphocytes 2.8 0.8 - 5.3 10e3/uL    Absolute Monocytes 0.6 0.0 - 1.3 10e3/uL    Absolute Eosinophils 0.1 0.0 - 0.7 10e3/uL    Absolute Basophils 0.0 0.0 - 0.2 10e3/uL    Absolute Immature Granulocytes 0.0 <=0.4 10e3/uL    Absolute NRBCs 0.0 10e3/uL   CBC with Platelets & Differential     Status: None    Narrative    The following orders were created for panel order CBC with Platelets & Differential.  Procedure                               Abnormality         Status                     ---------                               -----------         ------                     CBC with platelets and d...[[id number]]                      Final result                 Please view results for these tests on the individual orders.         ASSESSMENT  1  recurrent subconjunctival hemorrhage  2 dizziness likely related to dehydration  3  mild tenderness base of right ear suspect resolving chondritis   4 hypertension controlled non-pharacologically  5 hyperlipidemia needs follow-up on increased rosuvastatin  6 IGT    Plan  Will contact his eye doctor regarding the some conjunctival hemorrhage to see if there is any cause for concern with the recurrent nature of this.  We will assess his labs today and have him  follow-up with fasting lipids regarding the increase in the rosuvastatin dose.  There is evidence of any systemic inflammation or other abnormality we will consider imaging  Over 40 minutes on the day of service spent in chart review, patient contact, record completion and review and notification of lab reports    This note was completed using Dragon voice recognition software.      Ovidio Franco MD  General Internal Medicine  [facility]  [phone]

## 2022-07-13 NOTE — NURSING NOTE
"Carlitos Hernandez is a 66 year old male patient that presents today in clinic for the following:    Chief Complaint   Patient presents with     Blood Shot, Left Eye     He reports that this is the third episode which started about one week. He reports a history of them in the beginning of June and mid-June.      Dizziness     The patient reports notes a spell of dizziness wherein he reported to an Urgent Care back in June. He reports pain in his right ear following this encounter.      The patient's allergies and medications were reviewed as noted. A set of vitals were recorded as noted without incident: /82 (BP Location: Right arm, Patient Position: Sitting, Cuff Size: Adult Regular)   Pulse 77   Resp 16   Ht 1.816 m (5' 11.5\")   Wt 87.9 kg (193 lb 11.2 oz)   SpO2 94%   BMI 26.64 kg/m  . The patient does not have any other questions for the provider.    Papo Tran, EMT at 4:34 PM on 7/13/2022  "

## 2022-07-15 ENCOUNTER — MYC MEDICAL ADVICE (OUTPATIENT)
Dept: INTERNAL MEDICINE | Facility: CLINIC | Age: 67
End: 2022-07-15

## 2022-09-06 NOTE — PROGRESS NOTES
HPI  66-year-old presents today for reevaluation of significant reaction to take and other apparent bites.  On several occasions he has removed ticks and subsequently developed a erythematous lump in this area.  If he promptly applies triamcinolone cream is found that he can suppress this and even a limited reaction other areas however have been present for over a year now and continue to itch and bother her periodically.  Has not tried anything orally in terms of anti-inflammatory or antihistamine for this.  He would like to permanently suppress these reactions.  Otherwise he has been feeling well has been exercising regularly he has been doing high intensity training and we discussed this.  He has had no associated chest pain dyspnea dizziness or other symptoms in association with this.  Past Medical History:   Diagnosis Date     Hyperlipemia      It band syndrome, right 11/23/2016     Labile hypertension      Plantar fasciitis 2/19/2018     Past Surgical History:   Procedure Laterality Date     CRANIOTOMY, REPAIR ANEURYSM, COMBINED Right 8/9/2018    Procedure:  RIGHT CRANIOTOMY AND CLIPPING OF RIGHT CAROTID TERMINUS ANEURYSM, COMPLEX INTRAOPERATIVE ANGIOGRAMS;  Surgeon: Tigist Davidson MD;  Location: [facility];  Service:      IR CEREBRAL ANGIOGRAM  8/9/2018     rt craaniotomy for anerysm clipping  08/2018     thyroglossal duct cystectomy       THYROID SURGERY       TONSILLECTOMY       VASECTOMY       Family History   Problem Relation Age of Onset     Coronary Artery Disease Maternal Grandmother      Other Cancer Father         liver     Hypertension Mother      Asthma Sister      Social History     Socioeconomic History     Marital status:      Spouse name: Not on file     Number of children: Not on file     Years of education: Not on file     Highest education level: Not on file   Occupational History     Not on file   Tobacco Use     Smoking status: Never Smoker     Smokeless tobacco:  "Never Used   Substance and Sexual Activity     Alcohol use: Yes     Alcohol/week: 7.0 standard drinks     Types: 7 Standard drinks or equivalent per week     Drug use: No     Sexual activity: Not on file   Other Topics Concern     Not on file   Social History Narrative     Not on file     Social Determinants of Health     Financial Resource Strain:      Difficulty of Paying Living Expenses:    Food Insecurity:      Worried About Running Out of Food in the Last Year:      Ran Out of Food in the Last Year:    Transportation Needs:      Lack of Transportation (Medical):      Lack of Transportation (Non-Medical):    Physical Activity:      Days of Exercise per Week:      Minutes of Exercise per Session:    Stress:      Feeling of Stress :    Social Connections:      Frequency of Communication with Friends and Family:      Frequency of Social Gatherings with Friends and Family:      Attends [employer] Services:      Active Member of Clubs or Organizations:      Attends Club or Organization Meetings:      Marital Status:    Intimate Partner Violence:      Fear of Current or Ex-Partner:      Emotionally Abused:      Physically Abused:      Sexually Abused:        Exam:  /82 (BP Location: Right arm, Patient Position: Sitting, Cuff Size: Adult Regular)   Pulse 69   Resp 16   Ht 1.829 m (6')   Wt 88 kg (194 lb)   SpO2 95%   BMI 26.31 kg/m    194 lbs 0 oz  The patient is alert, oriented with a clear sensorium.   Skin shows several erythematous indurated granulomatous type lesions scattered in his \"swimsuit\" area no other  rashes and good turgor.   Head is normocephalic and atraumatic.     Lungs are clear.   Heart shows normal S1 and S2 without murmur or gallop.    Extremities show no edema.    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  We discussed the use of short course prednisone but he declined.  We will place a referral to dermatology and will have him follow-up with fasting labs update his immunizations today with a flu shot  Over 40 minutes on the day of service spent in chart review, patient contact, record completion and review and notification of lab reports      This note was completed using Dragon voice recognition software.      Ovidio Franco MD  General Internal Medicine  [facility]  [phone]         Detail Level: Detailed

## 2022-09-11 ENCOUNTER — HEALTH MAINTENANCE LETTER (OUTPATIENT)
Age: 67
End: 2022-09-11

## 2022-11-15 ENCOUNTER — TELEPHONE (OUTPATIENT)
Dept: INTERNAL MEDICINE | Facility: CLINIC | Age: 67
End: 2022-11-15

## 2022-11-15 ASSESSMENT — ENCOUNTER SYMPTOMS
JOINT SWELLING: 0
MUSCLE CRAMPS: 0
STIFFNESS: 0
MUSCLE WEAKNESS: 0
MYALGIAS: 1
ARTHRALGIAS: 1
POOR WOUND HEALING: 0
BACK PAIN: 1
NAIL CHANGES: 0
NECK PAIN: 0
SKIN CHANGES: 1

## 2022-11-15 ASSESSMENT — ACTIVITIES OF DAILY LIVING (ADL)
IN_THE_PAST_7_DAYS,_DID_YOU_NEED_HELP_FROM_OTHERS_TO_TAKE_CARE_OF_THINGS_SUCH_AS_LAUNDRY_AND_HOUSEKEEPING,_BANKING,_SHOPPING,_USING_THE_TELEPHONE,_FOOD_PREPARATION,_TRANSPORTATION,_OR_TAKING_YOUR_OWN_MEDICATIONS?: N
IN_THE_PAST_7_DAYS,_DID_YOU_NEED_HELP_FROM_OTHERS_TO_PERFORM_EVERYDAY_ACTIVITIES_SUCH_AS_EATING,_GETTING_DRESSED,_GROOMING,_BATHING,_WALKING,_OR_USING_THE_TOILET: N

## 2022-11-15 NOTE — TELEPHONE ENCOUNTER
Spoke with patient, he is going to call and schedule lab appointments in [facility].  Patient has contact information to call.

## 2022-11-16 ENCOUNTER — LAB (OUTPATIENT)
Dept: LAB | Facility: CLINIC | Age: 67
End: 2022-11-16
Payer: COMMERCIAL

## 2022-11-16 DIAGNOSIS — E78.5 HYPERLIPIDEMIA, UNSPECIFIED HYPERLIPIDEMIA TYPE: ICD-10-CM

## 2022-11-16 DIAGNOSIS — Z12.5 SPECIAL SCREENING FOR MALIGNANT NEOPLASM OF PROSTATE: ICD-10-CM

## 2022-11-16 LAB
CHOLEST SERPL-MCNC: 188 MG/DL
HDLC SERPL-MCNC: 42 MG/DL
LDLC SERPL CALC-MCNC: 107 MG/DL
NONHDLC SERPL-MCNC: 146 MG/DL
PSA SERPL-MCNC: 2.06 NG/ML (ref 0–4.5)
TRIGL SERPL-MCNC: 193 MG/DL

## 2022-11-16 PROCEDURE — 36415 COLL VENOUS BLD VENIPUNCTURE: CPT

## 2022-11-16 PROCEDURE — 80061 LIPID PANEL: CPT

## 2022-11-16 PROCEDURE — G0103 PSA SCREENING: HCPCS

## 2022-11-22 ENCOUNTER — OFFICE VISIT (OUTPATIENT)
Dept: INTERNAL MEDICINE | Facility: CLINIC | Age: 67
End: 2022-11-22
Payer: COMMERCIAL

## 2022-11-22 VITALS
HEART RATE: 71 BPM | HEIGHT: 71 IN | SYSTOLIC BLOOD PRESSURE: 130 MMHG | DIASTOLIC BLOOD PRESSURE: 84 MMHG | WEIGHT: 197.2 LBS | BODY MASS INDEX: 27.61 KG/M2 | OXYGEN SATURATION: 96 %

## 2022-11-22 DIAGNOSIS — E78.5 HYPERLIPIDEMIA, UNSPECIFIED HYPERLIPIDEMIA TYPE: ICD-10-CM

## 2022-11-22 PROCEDURE — 99397 PER PM REEVAL EST PAT 65+ YR: CPT | Performed by: INTERNAL MEDICINE

## 2022-11-22 RX ORDER — ROSUVASTATIN CALCIUM 10 MG/1
10 TABLET, COATED ORAL DAILY
Qty: 100 TABLET | Refills: 3 | Status: SHIPPED | OUTPATIENT
Start: 2022-11-22 | End: 2023-08-29

## 2022-11-22 NOTE — PROGRESS NOTES
HPI  67-year-old presents today for physical exam with several concerns.  He is concerned about his PSA.  He notices that it was 0.710 years ago and is wondering about the rise in this and we discussed this.  He is concerned about his lipids.  He rather than happy about the 60 point decline he compares it to lipids from 10 years ago which were a little better while he is taking Lipitor.  He quit taking the Lipitor because of muscle aching.  He has not had any muscle aching related to the rosuvastatin but he has had some occasional morning stiffness is predominantly in the joints and loosens up and improves with activity and movement.  He has concerns about several skin lesions which we reviewed.  He has had some crepitus in his right shoulder he has had known rotator cuff issues here has been doing rotator cuff exercises but and painful arc there is a little crepitus in the area of the rotator cuff tendons.  He is exercising regularly doing a combination of strength and cardiovascular training.  He is restricting his carbs he is eating a diet high in fruits and vegetables.  He has pretty much eliminated alcohol from his diet.  Past Medical History:   Diagnosis Date     Hyperlipemia      It band syndrome, right 11/23/2016     Labile hypertension      Plantar fasciitis 2/19/2018     Past Surgical History:   Procedure Laterality Date     CRANIOTOMY, REPAIR ANEURYSM, COMBINED Right 8/9/2018    Procedure:  RIGHT CRANIOTOMY AND CLIPPING OF RIGHT CAROTID TERMINUS ANEURYSM, COMPLEX INTRAOPERATIVE ANGIOGRAMS;  Surgeon: Tigist Davidson MD;  Location: [facility];  Service:      IR CEREBRAL ANGIOGRAM  8/9/2018     rt craaniotomy for anerysm clipping  08/2018     thyroglossal duct cystectomy       THYROID SURGERY       TONSILLECTOMY       VASECTOMY       Family History   Problem Relation Age of Onset     Coronary Artery Disease Maternal Grandmother      Other Cancer Father         liver     Hypertension Mother   "    Asthma Sister      Diabetes No family hx of      Glaucoma No family hx of      Macular Degeneration No family hx of        Answers for HPI/ROS submitted by the patient on 11/15/2022  General Symptoms: No  Skin Symptoms: Yes  HENT Symptoms: No  EYE SYMPTOMS: No  HEART SYMPTOMS: No  LUNG SYMPTOMS: No  INTESTINAL SYMPTOMS: No  URINARY SYMPTOMS: No  REPRODUCTIVE SYMPTOMS: No  SKELETAL SYMPTOMS: Yes  BLOOD SYMPTOMS: No  NERVOUS SYSTEM SYMPTOMS: No  MENTAL HEALTH SYMPTOMS: No  Changes in hair: No  Changes in moles/birth marks: Yes  Itching: Yes  Rashes: No  Changes in nails: No  Acne: No  Change in facial hair: No  Warts: No  Non-healing sores: No  Scarring: No  Flaking of skin: No  Color changes of hands/feet in cold : No  Sun sensitivity: No  Skin thickening: No  Back pain: Yes  Muscle aches: Yes  Neck pain: No  Swollen joints: No  Joint pain: Yes  Bone pain: No  Muscle cramps: No  Muscle weakness: No  Joint stiffness: No  Bone fracture: No      Exam:  /84 (BP Location: Right arm, Patient Position: Sitting, Cuff Size: Adult Regular)   Pulse 71   Ht 1.816 m (5' 11.5\")   Wt 89.4 kg (197 lb 3.2 oz)   SpO2 96%   BMI 27.12 kg/m    197 lbs 3.2 oz  Physical Exam   The patient is alert, oriented with a clear sensorium.   Skin shows multiple seborreic keratosis   Head is normocephalic and atraumatic.   Eyes show PERRLA   Ears show normal TMs bilaterally.   Mouth shows clear oral mucosa.   Neck shows no nodes, thyromegaly or bruits.   Back is non tender.  Lungs are clear to percussion and auscultation.   Heart shows normal S1 and S2 without murmur or gallop.   Abdomen is soft and nontender without masses or organomegaly.   Genitalia show normal testes. Small left inguinal hernia.  Rectal shows small smooth prostate without nodules or masses.  Extremities show no edema and no evidence of active synovitis.  He has some crepitus in the tendon and painful arc area of the right shoulder.  Neurologic examination shows " cranial nerves II-XII intact. Motor shows 5/5 strength. Reflexes are symmetric. Cerebellar testing shows normal tandem gait.  Romberg negative.      ASSESSMENT  1 hypertension controlled non-pharacologically  2 hyperlipidemia on rosuvastatin  3 IGT  4 Seborreic keratosis  5 right shoulder impingement    Plan  Increase the rosuvastatin to 10 mg daily put in a standing order for lipids so we can have it rechecked.  We discussed prostate cancer screening and he can decide if he wants to do PSA again next year.  Follow-up in a year    This note was completed using Dragon voice recognition software.      Ovidio Franco MD  General Internal Medicine  [facility]  [phone]

## 2022-11-22 NOTE — NURSING NOTE
Carlitos Hernandez is a 67 year old male patient that presents today in clinic for the following:    Chief Complaint   Patient presents with     Physical     PSA, Lipid concerns, skin concerns, colonoscopy     The patient's allergies and medications were reviewed as noted. A set of vitals were recorded as noted without incident. The patient does not have any other questions for the provider.    Dago Chacon, EMT at 1:04 PM on 11/22/2022

## 2022-12-27 ENCOUNTER — MYC MEDICAL ADVICE (OUTPATIENT)
Dept: INTERNAL MEDICINE | Facility: CLINIC | Age: 67
End: 2022-12-27

## 2023-02-02 ENCOUNTER — E-VISIT (OUTPATIENT)
Dept: INTERNAL MEDICINE | Facility: CLINIC | Age: 68
End: 2023-02-02
Payer: COMMERCIAL

## 2023-02-02 DIAGNOSIS — K40.90 INGUINAL HERNIA WITHOUT OBSTRUCTION OR GANGRENE, RECURRENCE NOT SPECIFIED, UNSPECIFIED LATERALITY: Primary | ICD-10-CM

## 2023-02-02 PROCEDURE — 99421 OL DIG E/M SVC 5-10 MIN: CPT | Performed by: INTERNAL MEDICINE

## 2023-02-24 ENCOUNTER — OFFICE VISIT (OUTPATIENT)
Dept: OPHTHALMOLOGY | Facility: CLINIC | Age: 68
End: 2023-02-24
Attending: OPTOMETRIST
Payer: COMMERCIAL

## 2023-02-24 DIAGNOSIS — H35.372 EPIRETINAL MEMBRANE, LEFT EYE: Primary | ICD-10-CM

## 2023-02-24 DIAGNOSIS — H52.4 MYOPIA OF BOTH EYES WITH ASTIGMATISM AND PRESBYOPIA: ICD-10-CM

## 2023-02-24 DIAGNOSIS — H43.813 PVD (POSTERIOR VITREOUS DETACHMENT), BILATERAL: ICD-10-CM

## 2023-02-24 DIAGNOSIS — H52.203 MYOPIA OF BOTH EYES WITH ASTIGMATISM AND PRESBYOPIA: ICD-10-CM

## 2023-02-24 DIAGNOSIS — H35.372 EPIRETINAL MEMBRANE, LEFT EYE: ICD-10-CM

## 2023-02-24 DIAGNOSIS — H52.13 MYOPIA OF BOTH EYES WITH ASTIGMATISM AND PRESBYOPIA: ICD-10-CM

## 2023-02-24 PROCEDURE — 92134 CPTRZ OPH DX IMG PST SGM RTA: CPT | Performed by: OPTOMETRIST

## 2023-02-24 PROCEDURE — 92015 DETERMINE REFRACTIVE STATE: CPT

## 2023-02-24 PROCEDURE — G0463 HOSPITAL OUTPT CLINIC VISIT: HCPCS

## 2023-02-24 PROCEDURE — 76512 OPH US DX B-SCAN: CPT | Performed by: OPTOMETRIST

## 2023-02-24 PROCEDURE — 92014 COMPRE OPH EXAM EST PT 1/>: CPT | Performed by: OPTOMETRIST

## 2023-02-24 PROCEDURE — G0463 HOSPITAL OUTPT CLINIC VISIT: HCPCS | Mod: 25

## 2023-02-24 ASSESSMENT — CONF VISUAL FIELD
OD_SUPERIOR_NASAL_RESTRICTION: 0
OD_NORMAL: 1
OD_SUPERIOR_TEMPORAL_RESTRICTION: 0
METHOD: COUNTING FINGERS
OD_INFERIOR_NASAL_RESTRICTION: 0
OS_INFERIOR_NASAL_RESTRICTION: 0
OS_SUPERIOR_NASAL_RESTRICTION: 0
OS_NORMAL: 1
OS_SUPERIOR_TEMPORAL_RESTRICTION: 0
OD_INFERIOR_TEMPORAL_RESTRICTION: 0
OS_INFERIOR_TEMPORAL_RESTRICTION: 0

## 2023-02-24 ASSESSMENT — REFRACTION_MANIFEST
OS_CYLINDER: +0.25
OD_CYLINDER: +1.25
OD_SPHERE: -2.00
OS_AXIS: 135
OD_AXIS: 030
OD_ADD: +2.50
OS_ADD: +2.50
OS_SPHERE: -2.00

## 2023-02-24 ASSESSMENT — TONOMETRY
OS_IOP_MMHG: 18
IOP_METHOD: TONOPEN
OD_IOP_MMHG: 18

## 2023-02-24 ASSESSMENT — REFRACTION_WEARINGRX
OD_ADD: +2.50
OS_AXIS: 115
OS_SPHERE: -2.00
OD_CYLINDER: +0.75
OS_ADD: +2.50
OD_AXIS: 055
OS_CYLINDER: +0.25
SPECS_TYPE: PAL
OD_SPHERE: -2.00

## 2023-02-24 ASSESSMENT — VISUAL ACUITY
METHOD: SNELLEN - LINEAR
OS_CC: 20/20
OD_CC: 20/20
OD_CC+: -2
CORRECTION_TYPE: GLASSES

## 2023-02-24 ASSESSMENT — SLIT LAMP EXAM - LIDS
COMMENTS: NORMAL
COMMENTS: NORMAL

## 2023-02-24 ASSESSMENT — EXTERNAL EXAM - LEFT EYE: OS_EXAM: NORMAL

## 2023-02-24 ASSESSMENT — EXTERNAL EXAM - RIGHT EYE: OD_EXAM: NORMAL

## 2023-02-24 NOTE — PATIENT INSTRUCTIONS
ERM left eye follow  PVD Discussed vitreoul floaters and PVD   Educated patient on signs and symptoms of retinal detachment including increase in flashes, floaters, or a change in vision. If symptoms present, return to clinic immediately.   New prescription for glasses   yearly exam     Patient disposition:

## 2023-02-24 NOTE — PROGRESS NOTES
Assessment & Plan       Carlitos Hernandez is a 67 year old male with the following diagnoses:   1. Epiretinal membrane, left eye - Both Eyes    2. PVD (posterior vitreous detachment), bilateral - Both Eyes    3. Myopia of both eyes with astigmatism and presbyopia - Both Eyes          ERM left eye follow  PVD Discussed vitreoul floaters and PVD   Educated patient on signs and symptoms of retinal detachment including increase in flashes, floaters, or a change in vision. If symptoms present, return to clinic immediately.   New prescription for glasses   yearly exam     Patient disposition:   No follow-ups on file.          Complete documentation of historical and exam elements from today's encounter can be found in the full encounter summary report (not reduplicated in this progress note). I personally obtained the chief complaint(s) and history of present illness.  I confirmed and edited as necessary the review of systems, past medical/surgical history, family history, social history, and examination findings as documented by others; and I examined the patient myself. I personally reviewed the relevant tests, images, and reports as documented above. I formulated and edited as necessary the assessment and plan and discussed the findings and management plan with the patient and family.  Dr. Antoni Love

## 2023-02-24 NOTE — NURSING NOTE
Chief Complaints and History of Present Illnesses   Patient presents with     Annual Eye Exam     Epiretinal membrane, left eye     Chief Complaint(s) and History of Present Illness(es)     Annual Eye Exam            Comments: Epiretinal membrane, left eye          Comments    Pt states no change in VA since last visit  Pt states floaters have increased in both eyes  No flashes, eye pain or redness    Jeanna Tamez COT 12:36 PM February 24, 2023                                 
no

## 2023-04-21 ENCOUNTER — LAB (OUTPATIENT)
Dept: LAB | Facility: CLINIC | Age: 68
End: 2023-04-21
Payer: COMMERCIAL

## 2023-04-21 DIAGNOSIS — E78.5 HYPERLIPIDEMIA, UNSPECIFIED HYPERLIPIDEMIA TYPE: ICD-10-CM

## 2023-04-21 LAB
CHOLEST SERPL-MCNC: 215 MG/DL
HDLC SERPL-MCNC: 44 MG/DL
LDLC SERPL CALC-MCNC: 121 MG/DL
NONHDLC SERPL-MCNC: 171 MG/DL
TRIGL SERPL-MCNC: 249 MG/DL

## 2023-04-21 PROCEDURE — 36415 COLL VENOUS BLD VENIPUNCTURE: CPT

## 2023-04-21 PROCEDURE — 80061 LIPID PANEL: CPT

## 2023-08-28 ENCOUNTER — LAB (OUTPATIENT)
Dept: LAB | Facility: CLINIC | Age: 68
End: 2023-08-28
Payer: COMMERCIAL

## 2023-08-28 DIAGNOSIS — E78.5 HYPERLIPIDEMIA, UNSPECIFIED HYPERLIPIDEMIA TYPE: ICD-10-CM

## 2023-08-28 LAB
CHOLEST SERPL-MCNC: 197 MG/DL
HDLC SERPL-MCNC: 42 MG/DL
LDLC SERPL CALC-MCNC: 109 MG/DL
NONHDLC SERPL-MCNC: 155 MG/DL
TRIGL SERPL-MCNC: 229 MG/DL

## 2023-08-28 PROCEDURE — 80061 LIPID PANEL: CPT

## 2023-08-28 PROCEDURE — 36415 COLL VENOUS BLD VENIPUNCTURE: CPT

## 2023-08-29 DIAGNOSIS — E78.5 HYPERLIPIDEMIA, UNSPECIFIED HYPERLIPIDEMIA TYPE: ICD-10-CM

## 2023-08-29 RX ORDER — ROSUVASTATIN CALCIUM 20 MG/1
20 TABLET, COATED ORAL DAILY
Qty: 90 TABLET | Refills: 3 | Status: SHIPPED | OUTPATIENT
Start: 2023-08-29 | End: 2024-10-01

## 2023-11-02 ENCOUNTER — LAB (OUTPATIENT)
Dept: LAB | Facility: CLINIC | Age: 68
End: 2023-11-02
Payer: COMMERCIAL

## 2023-11-02 DIAGNOSIS — A69.20 LYME DISEASE: ICD-10-CM
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
## 2023-11-02 DIAGNOSIS — E78.5 HYPERLIPIDEMIA, UNSPECIFIED HYPERLIPIDEMIA TYPE: ICD-10-CM

## 2023-11-02 DIAGNOSIS — R73.02 IGT (IMPAIRED GLUCOSE TOLERANCE): ICD-10-CM

## 2023-11-02 LAB
ALBUMIN SERPL BCG-MCNC: 4.4 G/DL (ref 3.5–5.2)
ALP SERPL-CCNC: 78 U/L (ref 40–129)
ALT SERPL W P-5'-P-CCNC: 36 U/L (ref 0–70)
ANION GAP SERPL CALCULATED.3IONS-SCNC: 9 MMOL/L (ref 7–15)
AST SERPL W P-5'-P-CCNC: 29 U/L (ref 0–45)
B BURGDOR IGG+IGM SER QL: 0.2
BILIRUB SERPL-MCNC: 0.7 MG/DL
BUN SERPL-MCNC: 16.6 MG/DL (ref 8–23)
CALCIUM SERPL-MCNC: 9.6 MG/DL (ref 8.8–10.2)
CHLORIDE SERPL-SCNC: 105 MMOL/L (ref 98–107)
CHOLEST SERPL-MCNC: 178 MG/DL
CREAT SERPL-MCNC: 0.97 MG/DL (ref 0.67–1.17)
DEPRECATED HCO3 PLAS-SCNC: 27 MMOL/L (ref 22–29)
EGFRCR SERPLBLD CKD-EPI 2021: 84 ML/MIN/1.73M2
GLUCOSE SERPL-MCNC: 101 MG/DL (ref 70–99)
HBA1C MFR BLD: 5.6 % (ref 0–5.6)
HDLC SERPL-MCNC: 39 MG/DL
LDLC SERPL CALC-MCNC: 72 MG/DL
NONHDLC SERPL-MCNC: 139 MG/DL
POTASSIUM SERPL-SCNC: 4.3 MMOL/L (ref 3.4–5.3)
PROT SERPL-MCNC: 7 G/DL (ref 6.4–8.3)
SODIUM SERPL-SCNC: 141 MMOL/L (ref 135–145)
TRIGL SERPL-MCNC: 337 MG/DL

## 2023-11-02 PROCEDURE — 83036 HEMOGLOBIN GLYCOSYLATED A1C: CPT

## 2023-11-02 PROCEDURE — 80053 COMPREHEN METABOLIC PANEL: CPT

## 2023-11-02 PROCEDURE — 80061 LIPID PANEL: CPT

## 2023-11-02 PROCEDURE — 99000 SPECIMEN HANDLING OFFICE-LAB: CPT

## 2023-11-02 PROCEDURE — 36415 COLL VENOUS BLD VENIPUNCTURE: CPT

## 2023-11-02 PROCEDURE — 86666 EHRLICHIA ANTIBODY: CPT | Mod: 90

## 2023-11-02 PROCEDURE — 86753 PROTOZOA ANTIBODY NOS: CPT | Mod: 90

## 2023-11-02 PROCEDURE — 86618 LYME DISEASE ANTIBODY: CPT

## 2023-11-04 ENCOUNTER — OFFICE VISIT (OUTPATIENT)
Dept: FAMILY MEDICINE | Facility: CLINIC | Age: 68
End: 2023-11-04
Payer: COMMERCIAL

## 2023-11-04 VITALS
TEMPERATURE: 97.5 F | SYSTOLIC BLOOD PRESSURE: 128 MMHG | DIASTOLIC BLOOD PRESSURE: 73 MMHG | RESPIRATION RATE: 12 BRPM | OXYGEN SATURATION: 95 % | HEART RATE: 74 BPM | BODY MASS INDEX: 28.75 KG/M2 | WEIGHT: 209 LBS

## 2023-11-04 DIAGNOSIS — H65.01 NON-RECURRENT ACUTE SEROUS OTITIS MEDIA OF RIGHT EAR: Primary | ICD-10-CM

## 2023-11-04 LAB
B MICROTI IGG TITR SER: NORMAL {TITER}
B MICROTI IGM TITR SER: NORMAL {TITER}

## 2023-11-04 PROCEDURE — 99213 OFFICE O/P EST LOW 20 MIN: CPT | Performed by: PHYSICIAN ASSISTANT

## 2023-11-04 RX ORDER — AMOXICILLIN 500 MG/1
1000 CAPSULE ORAL 2 TIMES DAILY
Qty: 28 CAPSULE | Refills: 0 | Status: SHIPPED | OUTPATIENT
Start: 2023-11-04 | End: 2023-11-11

## 2023-11-04 NOTE — PROGRESS NOTES
URGENT CARE VISIT:    SUBJECTIVE:   Carlitos Hernandez is a 68 year old male presenting with a chief complaint of stuffy nose and ear pain right.  Onset was 1 day(s) ago. Has had mild cold for a few days. Ear pain last night but is better now.   He denies the following symptoms: cough - productive and sore throat  Course of illness is improving.    Treatment measures tried include None tried with good relief of symptoms.  Predisposing factors include hx of bad left ear infection which led to permanent hearing loss.    PMH:   Past Medical History:   Diagnosis Date    Hyperlipemia     It band syndrome, right 11/23/2016    Labile hypertension     Plantar fasciitis 2/19/2018     Allergies: Wasps [hornets] and Pollen extract   Medications:   Current Outpatient Medications   Medication Sig Dispense Refill    amoxicillin (AMOXIL) 500 MG capsule Take 2 capsules (1,000 mg) by mouth 2 times daily for 7 days 28 capsule 0    cetirizine (ZYRTEC) 10 MG tablet Take 1 tablet (10 mg) by mouth daily as needed (itching) 30 tablet 4    EPINEPHrine (ANY BX GENERIC EQUIV) 0.3 MG/0.3ML injection 2-pack Inject 0.3 mLs (0.3 mg) into the muscle once as needed for anaphylaxis 2 each 1    fluocinonide (LIDEX) 0.05 % external cream Apply topically 3 times daily 60 g 11    glucosamine-chondroitin 500-400 MG CAPS per capsule Take 3 capsules by mouth daily 200 capsule 0    rosuvastatin (CRESTOR) 20 MG tablet Take 1 tablet (20 mg) by mouth daily 90 tablet 3    sildenafil (VIAGRA) 100 MG tablet Take 1 tablet (100 mg) by mouth daily as needed (30min to 4 hrs before sex.) .  Do not use with nitroglycerin, terazosin or doxazosin. 18 tablet 3     Social History:   Social History     Tobacco Use    Smoking status: Never    Smokeless tobacco: Never   Substance Use Topics    Alcohol use: Yes     Alcohol/week: 7.0 standard drinks of alcohol     Types: 7 Standard drinks or equivalent per week       ROS:  Review of systems negative except as stated  above.    OBJECTIVE:  /73   Pulse 74   Temp 97.5  F (36.4  C)   Resp 12   Wt 94.8 kg (209 lb)   SpO2 95%   BMI 28.75 kg/m    GENERAL APPEARANCE: healthy, alert and no distress  EYES: EOMI,  PERRL, conjunctiva clear  HENT: ear canals and TM's normal.  Nose and mouth without ulcers, erythema or lesions  NECK: supple, nontender, no lymphadenopathy  RESP: lungs clear to auscultation - no rales, rhonchi or wheezes  CV: regular rates and rhythm, normal S1 S2, no murmur noted  SKIN: no suspicious lesions or rashes      ASSESSMENT:    ICD-10-CM    1. Non-recurrent acute serous otitis media of right ear  H65.01 amoxicillin (AMOXIL) 500 MG capsule          PLAN:  Patient Instructions   Patient was educated on the natural course of viral illness which typically lasts up to 7 days.  No evidence of infection. Wait and see approach taken. If pain becomes persistent and worsening then start antibiotic. Side effects discussed. Conservative measures discussed including increased fluids, nasal saline irrigation (neti pot), warm steamy shower, salt water gargles, Flonase nasal spray, Pseudofed, and analgesics (Tylenol and/or Ibuprofen). See your primary care provider if symptoms worsen or do not improve in 7 days. Seek emergency care if you develop fever over 104 or shortness of breath.  Patient verbalized understanding and is agreeable to plan. The patient was discharged ambulatory and in stable condition.    Wen Vega PA-C ....................  11/4/2023   3:38 PM

## 2023-11-04 NOTE — PATIENT INSTRUCTIONS
Patient was educated on the natural course of viral illness which typically lasts up to 7 days.  Conservative measures discussed including increased fluids, nasal saline irrigation (neti pot), warm steamy shower, salt water gargles, Flonase nasal spray, Pseudofed, and analgesics (Tylenol and/or Ibuprofen). See your primary care provider if symptoms worsen or do not improve in 7 days. Seek emergency care if you develop fever over 104 or shortness of breath.

## 2023-11-06 LAB
A PHAGOCYTOPH IGG TITR SER IF: NORMAL {TITER}
A PHAGOCYTOPH IGM TITR SER IF: NORMAL {TITER}

## 2023-11-21 ENCOUNTER — OFFICE VISIT (OUTPATIENT)
Dept: INTERNAL MEDICINE | Facility: CLINIC | Age: 68
End: 2023-11-21
Payer: COMMERCIAL

## 2023-11-21 VITALS
OXYGEN SATURATION: 95 % | DIASTOLIC BLOOD PRESSURE: 88 MMHG | HEART RATE: 75 BPM | SYSTOLIC BLOOD PRESSURE: 138 MMHG | BODY MASS INDEX: 28.8 KG/M2 | HEIGHT: 71 IN | WEIGHT: 205.7 LBS

## 2023-11-21 DIAGNOSIS — E78.5 HYPERLIPIDEMIA, UNSPECIFIED HYPERLIPIDEMIA TYPE: ICD-10-CM

## 2023-11-21 DIAGNOSIS — L57.0 AK (ACTINIC KERATOSIS): ICD-10-CM

## 2023-11-21 DIAGNOSIS — N43.3 HYDROCELE, UNSPECIFIED HYDROCELE TYPE: ICD-10-CM

## 2023-11-21 DIAGNOSIS — R68.82 DECREASED LIBIDO: Primary | ICD-10-CM

## 2023-11-21 PROCEDURE — 17110 DESTRUCTION B9 LES UP TO 14: CPT | Performed by: INTERNAL MEDICINE

## 2023-11-21 PROCEDURE — G0439 PPPS, SUBSEQ VISIT: HCPCS | Performed by: INTERNAL MEDICINE

## 2023-11-21 RX ORDER — SILDENAFIL 100 MG/1
100 TABLET, FILM COATED ORAL DAILY PRN
Qty: 30 TABLET | Refills: 4 | Status: SHIPPED | OUTPATIENT
Start: 2023-11-21

## 2023-11-21 NOTE — PROGRESS NOTES
Carlitos is a 68 year old that presents in clinic today for the following:     Chief Complaint   Patient presents with    Physical     Pt here for annual physical and would like to discuss tick bites form this past summer, hernia, lesions on face, and lipid profile           11/21/2023     1:29 PM   Additional Questions   Roomed by Irene Lilly   Accompanied by N/A       Screenings as of today     PHQ-2 Total Score (Adult) - Positive if 3 or more points; Administer   PHQ-9 if positive 0    Fallen 2 or more times in the past year? No    Any fall with injury in the past year? 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  He had some skin lesions on his face which have fluctuated and he is concerned about this and interested and cryotherapy for this.  He has noted decreased libido recently and he feels that this is a change the sexual frequency has gone from a couple times a week to a once every couple of weeks.  He is requesting a prescription for sildenafil to help with erections and is concerned regarding testosterone level and so we will arrange that in the morning.  He reports that his recent labs were not done fasting explaining the slight elevation in the blood sugar and the significant elevation in the triglycerides.  He agreed to repeat the lipids fasting at the time of the testosterone evaluation.  He is exercising regularly he is trying to eat healthy he is sleeping 9 to 10 hours at night but is waking up refreshed and not feeling sleepy or tired during the day.  Uses no alcohol or tobacco.  He has been concerned about his hydrocele and his hernia since last year and the hydrocele has become intermittently symptomatic based on size.  Past Medical History:   Diagnosis Date    Hyperlipemia     It band syndrome, right 11/23/2016    Labile hypertension     Plantar fasciitis 2/19/2018     Past Surgical History:   Procedure  "Laterality Date    COLONOSCOPY  2014    CRANIOTOMY, REPAIR ANEURYSM, COMBINED Right 08/09/2018    Procedure:  RIGHT CRANIOTOMY AND CLIPPING OF RIGHT CAROTID TERMINUS ANEURYSM, COMPLEX INTRAOPERATIVE ANGIOGRAMS;  Surgeon: Tigist Davidson MD;  Location: [facility];  Service:     IR CEREBRAL ANGIOGRAM  08/09/2018    rt craaniotomy for anerysm clipping  08/2018    thyroglossal duct cystectomy      THYROID SURGERY      TONSILLECTOMY      VASECTOMY       Family History   Problem Relation Age of Onset    Coronary Artery Disease Maternal Grandmother     Other Cancer Father         liver    Hypertension Mother     Cerebrovascular Disease Mother     Asthma Sister     Diabetes No family hx of     Glaucoma No family hx of     Macular Degeneration No family hx of          Exam:  /88 (BP Location: Right arm, Patient Position: Sitting, Cuff Size: Adult Regular)   Pulse 75   Ht 1.805 m (5' 11.06\")   Wt 93.3 kg (205 lb 11.2 oz)   SpO2 95%   BMI 28.64 kg/m    205 lbs 11.2 oz  Physical Exam   The patient is alert, oriented with a clear sensorium.   Skin shows apparent actinic keratosis on the right temple seborrheic keratosis in the scalp both treated with cryotherapy.  Another small keratosis on the left thigh was also treated with the liquid nitrogen  Head is normocephalic and atraumatic.   Ears show normal TMs bilaterally.   Mouth shows clear oral mucosa.   Neck shows no nodes, thyromegaly or bruits.   Back is non tender.  Lungs are clear to percussion and auscultation.   Heart shows normal S1 and S2 without murmur or gallop.   Abdomen is soft and nontender without masses or organomegaly.   Genitalia show large right-sided hydrocele with evidence of small bilateral inguinal hernias.  Extremities show no edema and no evidence of active synovitis.   Neurologic examination shows cranial nerves II-XII intact. Motor shows 5/5 strength. Reflexes are symmetric. Cerebellar testing shows normal tandem gait.  " Romberg negative.    Labs reviewed:   Latest Reference Range & Units 11/02/23 08:08   Sodium 135 - 145 mmol/L 141   Potassium 3.4 - 5.3 mmol/L 4.3   Chloride 98 - 107 mmol/L 105   Carbon Dioxide (CO2) 22 - 29 mmol/L 27   Urea Nitrogen 8.0 - 23.0 mg/dL 16.6   Creatinine 0.67 - 1.17 mg/dL 0.97   GFR Estimate >60 mL/min/1.73m2 84   Calcium 8.8 - 10.2 mg/dL 9.6   Anion Gap 7 - 15 mmol/L 9   Albumin 3.5 - 5.2 g/dL 4.4   Protein Total 6.4 - 8.3 g/dL 7.0   Alkaline Phosphatase 40 - 129 U/L 78   ALT 0 - 70 U/L 36   AST 0 - 45 U/L 29   Bilirubin Total <=1.2 mg/dL 0.7   Cholesterol <200 mg/dL 178   Glucose 70 - 99 mg/dL 101 (H)   HDL Cholesterol >=40 mg/dL 39 (L)   Hemoglobin A1C 0.0 - 5.6 % 5.6   LDL Cholesterol Calculated <=100 mg/dL 72   Non HDL Cholesterol <130 mg/dL 139 (H)   Triglycerides <150 mg/dL 337 (H)       ASSESSMENT  1 hypertension borderline control non-pharacologically  2 hyperlipidemia on rosuvastatin  3 IGT  4 Actinic keratosis treated with cryotherapy  5 symptomatic right hydrocele  6 small bilateral inguinal hernias    Plan  We will follow-up with the fasting lipids testosterone levels will continue him on the rosuvastatin will give him a prescription for the sildenafil as well as information regarding good Rx and the cost of this planned him follow-up in a year sooner immediately if any increase symptoms or    This note was completed using Dragon voice recognition software.      Ovidio Franco MD  General Internal Medicine  [facility]  [phone]

## 2023-11-21 NOTE — PROGRESS NOTES
Medicare Annual Wellness Questionnaire:  This 68 year old year old male presents for a Medicare Wellness Exam.    Fall Risk Assessment:  Have you fallen 2 or more times in the last year?  No    How many times were you injured due to a fall in the last year?  none    PHQ-2:  Over the last 2 weeks, how often have you been bothered by feeling down, depressed, or hopeless?  Not at all (0)     Over the last 2 weeks, how often have you had little interest or pleasure in doing things?  Not at all (0)   Social History:  What is your marital status?      Who lives in your household?  Spouse, daughter    Does your home have loose rugs in the hallway:     Yes     Does your home have grab bars in the bathroom:    No    Does your home have handrails on the stairs?  No    Does your home have poorly lit areas?    No    Do you feel threatened or controlled by a partner, ex-partner or anyone in your life?   No    Has anyone hurt you physically, for example by pushing, hitting, slapping or kicking you or forcing you to have sex?   No    Do you need help with the phone, transportation, shopping, preparing meals, housework, laundry, medications or managing money?   No    Sexual Health:  Are you sexually active?    Yes    If yes, with men, women, or both?   Women     If yes, how many partners?  1    If yes, are you using condoms?    No    Have you had any sexually transmitted infections in the last year?   No    Do you have any sexual concerns?    Yes, loss of libido    Women Only:  Women: What year did you stop having periods (approximate age)?  N/A    Women: Any vaginal bleeding in the last year?    N/A    Women: Have you ever had an abnormal Pap smear?    N/A    General Health Assessment:  Have you noticed any hearing difficulties?   Yes     Do you wear hearing aids?   No    Have you seen a hearing professional such as an audiologist in the last 1 year?   No    Do you have vision difficulty?    Yes     Do you wear glasses or  contacts?   Yes     Have you seen an eye doctor in the last 1 year?   Yes     How many servings of fruits and vegetables do you eat a day?  Fruit: 2  Vegetables: 2    How often do you exercise in a week?  5-6    How long and what kind of exercise do you do?  ~1 hr, stair stepping, weight lifting, walking    Tobacco and Alcohol History:  Do you use tobacco/nicotine products?    No    If yes, please list the method of use and average weekly consumption?  N/A    Do you use any other drugs?   No         Do you drink alcohol?   No    If you drink alcohol, how many drinks per week?  N/A    Advanced Directive:  Have you completed an Advance Directives document?  Yes     If yes, have you given a copy to the clinic?   No    Do you need information on Advance Directives?   No    Irene Lilly, EMT at 2:43 PM on 11/21/2023

## 2023-11-29 ENCOUNTER — LAB (OUTPATIENT)
Dept: LAB | Facility: CLINIC | Age: 68
End: 2023-11-29
Payer: COMMERCIAL

## 2023-11-29 DIAGNOSIS — E78.5 HYPERLIPIDEMIA, UNSPECIFIED HYPERLIPIDEMIA TYPE: ICD-10-CM

## 2023-11-29 DIAGNOSIS — R68.82 DECREASED LIBIDO: ICD-10-CM

## 2023-11-29 LAB
CHOLEST SERPL-MCNC: 184 MG/DL
HDLC SERPL-MCNC: 45 MG/DL
LDLC SERPL CALC-MCNC: 106 MG/DL
NONHDLC SERPL-MCNC: 139 MG/DL
SHBG SERPL-SCNC: 18 NMOL/L (ref 11–80)
TRIGL SERPL-MCNC: 166 MG/DL

## 2023-11-29 PROCEDURE — 80061 LIPID PANEL: CPT

## 2023-11-29 PROCEDURE — 84403 ASSAY OF TOTAL TESTOSTERONE: CPT

## 2023-11-29 PROCEDURE — 84270 ASSAY OF SEX HORMONE GLOBUL: CPT

## 2023-11-29 PROCEDURE — 36415 COLL VENOUS BLD VENIPUNCTURE: CPT

## 2023-11-30 ENCOUNTER — PRE VISIT (OUTPATIENT)
Dept: UROLOGY | Facility: CLINIC | Age: 68
End: 2023-11-30

## 2023-11-30 ENCOUNTER — OFFICE VISIT (OUTPATIENT)
Dept: UROLOGY | Facility: CLINIC | Age: 68
End: 2023-11-30
Attending: INTERNAL MEDICINE
Payer: COMMERCIAL

## 2023-11-30 VITALS
BODY MASS INDEX: 28 KG/M2 | WEIGHT: 200 LBS | DIASTOLIC BLOOD PRESSURE: 86 MMHG | OXYGEN SATURATION: 95 % | HEART RATE: 76 BPM | SYSTOLIC BLOOD PRESSURE: 141 MMHG | HEIGHT: 71 IN | RESPIRATION RATE: 17 BRPM

## 2023-11-30 DIAGNOSIS — N43.40 SPERMATOCELE: ICD-10-CM

## 2023-11-30 DIAGNOSIS — N50.82 SCROTAL PAIN: Primary | ICD-10-CM

## 2023-11-30 DIAGNOSIS — N43.3 HYDROCELE, UNSPECIFIED HYDROCELE TYPE: ICD-10-CM

## 2023-11-30 PROCEDURE — 99203 OFFICE O/P NEW LOW 30 MIN: CPT | Performed by: UROLOGY

## 2023-11-30 RX ORDER — CEFAZOLIN SODIUM 2 G/50ML
2 SOLUTION INTRAVENOUS SEE ADMIN INSTRUCTIONS
Status: CANCELLED | OUTPATIENT
Start: 2023-11-30

## 2023-11-30 RX ORDER — CEFAZOLIN SODIUM 2 G/50ML
2 SOLUTION INTRAVENOUS
Status: CANCELLED | OUTPATIENT
Start: 2023-11-30

## 2023-11-30 ASSESSMENT — PAIN SCALES - GENERAL: PAINLEVEL: NO PAIN (0)

## 2023-11-30 NOTE — CONFIDENTIAL NOTE
Reason for visit: consult     Relevant information: hydrocele, right    Records/imaging/labs/orders: in epic    At Rooming: nelly Kam  11/30/2023  8:23 AM

## 2023-11-30 NOTE — LETTER
11/30/2023       RE: Carlitos Hernandez  [address] [place] [address]     Dear Colleague,    Thank you for referring your patient, Carlitos Hernandez, to the [facility] [place] [facility]. Please see a copy of my visit note below.    I am seeing Carlitos Hernandez in consultation from Ovidio Franco  for evaluation of right scrotal bulge.    HPI:  Carlitos Hernandez is a 68 year old male with history right spermatocele.  Dr. Muse and also thought he may have small bilateral inguinal hernias, on his physical exam.  Mr. Hernandez has not had any hernia type bulge or pain noted.    Right hydrocele versus spermatocele.  This started after his vasectomy years ago.  Vasectomy required a re-do.  He wondered if this extra manipulation contributed to the scrotal fluid collection.  This has enlarged over time, and is bulky and uncomfortable enough that he would now like this repaired.    PAST MEDICAL HX:  Past Medical History:   Diagnosis Date    Hyperlipemia     It band syndrome, right 11/23/2016    Labile hypertension     Plantar fasciitis 2/19/2018       PAST SURG HX:  Past Surgical History:   Procedure Laterality Date    COLONOSCOPY  2014    CRANIOTOMY, REPAIR ANEURYSM, COMBINED Right 08/09/2018    Procedure:  RIGHT CRANIOTOMY AND CLIPPING OF RIGHT CAROTID TERMINUS ANEURYSM, COMPLEX INTRAOPERATIVE ANGIOGRAMS;  Surgeon: Tigist Davidson MD;  Location: [facility];  Service:     IR CEREBRAL ANGIOGRAM  08/09/2018    rt craaniotomy for anerysm clipping  08/2018    thyroglossal duct cystectomy      THYROID SURGERY      TONSILLECTOMY      VASECTOMY          FAMILY HX:  Family History   Problem Relation Age of Onset    Coronary Artery Disease Maternal Grandmother     Other Cancer Father         liver    Hypertension Mother     Cerebrovascular Disease Mother     Asthma Sister     Diabetes No family hx of     Glaucoma No family hx of     Macular  "Degeneration No family hx of        SOCIAL HX:  Social History     Tobacco Use    Smoking status: Never    Smokeless tobacco: Never    Tobacco comments:     none   Substance Use Topics    Alcohol use: Not Currently     Alcohol/week: 7.0 standard drinks of alcohol     Types: 7 Standard drinks or equivalent per week    Drug use: No       MEDICATIONS:  Current Outpatient Medications   Medication Sig    creatine 400 MG capsule     EPINEPHrine (ANY BX GENERIC EQUIV) 0.3 MG/0.3ML injection 2-pack Inject 0.3 mLs (0.3 mg) into the muscle once as needed for anaphylaxis    fluocinonide (LIDEX) 0.05 % external cream Apply topically 3 times daily    glucosamine-chondroitin 500-400 MG CAPS per capsule Take 3 capsules by mouth daily    rosuvastatin (CRESTOR) 20 MG tablet Take 1 tablet (20 mg) by mouth daily    sildenafil (VIAGRA) 100 MG tablet Take 1 tablet (100 mg) by mouth daily as needed    sildenafil (VIAGRA) 100 MG tablet Take 1 tablet (100 mg) by mouth daily as needed (30min to 4 hrs before sex.) .  Do not use with nitroglycerin, terazosin or doxazosin.     Current Facility-Administered Medications   Medication    triamcinolone acetonide (KENALOG-10) injection 2 mg    triamcinolone acetonide (KENALOG-10) injection 4 mg       ALLERGIES:  Wasps [hornets], Cats, and Pollen extract      GENERAL PHYSICAL EXAM:     BP (!) 141/86 (BP Location: Right arm, Patient Position: Sitting, Cuff Size: Adult Large)   Pulse 76   Resp 17   Ht 1.803 m (5' 11\")   Wt 90.7 kg (200 lb)   SpO2 95%   BMI 27.89 kg/m     Constitutional: No acute distress. Well nourished.   PSYCH: normal mood and affect.  NEURO: normal gait, no focal deficits.   EYES: anicteric, EOMI, PERR.  CARDIOPULMONARY: breathing non-labored, pulse regular rate/rhythm, no peripheral edema.  GI: Abdomen soft, non-tender, nondistended   MUSCULOSKELETAL: normal limb proportions, no muscle wasting, no contractures.  SKIN: Normal virilized hair distribution, no lesions, warts or " rashes over genitalia, abdomen extremities or face.  HEME/LYMPH: no ecchymosis, no lymphadenopathy in groin, no lymphedema.     EXAM:  Phallus circumcised, meatus adequate, no plaques palpated.   Left testis descended , size is normal  , consistency is normal  No intra-testicular masses.   Right testis descended , size is normal  , consistency is normal . No intra-testicular masses.   There is a large lemon-sized right spermatocele, about 6cm in size.  Cord structures not remarkable.     Prostate exam: deferred     Possible slight bulge in the left inguinal canal with cough.  Right inguinal canal not examined.  No gross evidence of inguinal hernia on either side by my exam.      Imaging/labs:  Lab Results   Component Value Date    CR 0.97 11/02/2023    CR 0.92 07/13/2022    CR 0.98 04/23/2019    CR 1.06 08/30/2018     Lab Results   Component Value Date    PSA 2.06 11/16/2022       ASSESSMENT:   Right spermatocele causing pain/ discomfort.  Possible small inguinal hernia bilateral by Dr. Franco's exam.  No significant inguinal hernia by my exam today.  Discussed urologists generally don't perform inguinal hernia repair.  Offered referral to gen surg, but I don't think this is required at this time, as no clinical hernia and these are asymptomatic.  Normal PSA     PLAN:  Right spermatocele repair surgery orders placed.  Discussed risks, benefits, and alternatives.  Discussed risks of spermatocele repair including: hematocele, recurrence, testis pain, possible atrophy or loss of the testicle if arteries are injured.  All of these are low risk.  Scrotal ultrasound now for surgery planning.      Thank-you for the kind consultation.  Ham Shay MD     Urological Surgeon      Additional Coding Information:    Problems:  4 -- one or more chronic illnesses with exacerbation or side effects    Data Reviewed  PSA     Tests ordered/pending: scrotal ultrasound     Notes from other providers reviewed: Dr. Franco note  11/21/2023    Level of risk:  3 -- low risk (e.g., OTC medication or observation, minor surgery without risks)    Time spent:  25 minutes spent on the date of the encounter doing chart review, history and exam, documentation and further activities per the note

## 2023-11-30 NOTE — NURSING NOTE
"Chief Complaint   Patient presents with    Consult     I want to get evaluated for hernia and Hyrdocele       Blood pressure (!) 141/86, pulse 76, resp. rate 17, height 1.803 m (5' 11\"), weight 90.7 kg (200 lb), SpO2 95%. Body mass index is 27.89 kg/m .    Patient Active Problem List   Diagnosis    Hyperlipemia    Hyperlipidemia, unspecified hyperlipidemia type    It band syndrome, right    Plantar fasciitis       Allergies   Allergen Reactions    Wasps [Hornets] Other (See Comments) and Hives     Decreased heart rate.     Cats Blisters    Pollen Extract        Current Outpatient Medications   Medication Sig Dispense Refill    cetirizine (ZYRTEC) 10 MG tablet Take 1 tablet (10 mg) by mouth daily as needed (itching) 30 tablet 4    creatine 400 MG capsule       EPINEPHrine (ANY BX GENERIC EQUIV) 0.3 MG/0.3ML injection 2-pack Inject 0.3 mLs (0.3 mg) into the muscle once as needed for anaphylaxis 2 each 1    fluocinonide (LIDEX) 0.05 % external cream Apply topically 3 times daily 60 g 11    glucosamine-chondroitin 500-400 MG CAPS per capsule Take 3 capsules by mouth daily 200 capsule 0    rosuvastatin (CRESTOR) 20 MG tablet Take 1 tablet (20 mg) by mouth daily 90 tablet 3    sildenafil (VIAGRA) 100 MG tablet Take 1 tablet (100 mg) by mouth daily as needed 30 tablet 4    sildenafil (VIAGRA) 100 MG tablet Take 1 tablet (100 mg) by mouth daily as needed (30min to 4 hrs before sex.) .  Do not use with nitroglycerin, terazosin or doxazosin. 18 tablet 3       Social History     Tobacco Use    Smoking status: Never    Smokeless tobacco: Never    Tobacco comments:     none   Substance Use Topics    Alcohol use: Not Currently     Alcohol/week: 7.0 standard drinks of alcohol     Types: 7 Standard drinks or equivalent per week    Drug use: No       Leanne Morillo  11/30/2023  10:06 AM     "

## 2023-11-30 NOTE — PROGRESS NOTES
I am seeing Carlitos Hernandez in consultation from Ovidio Franco  for evaluation of right scrotal bulge.    HPI:  Carlitos Hernandez is a 68 year old male with history right spermatocele.  Dr. Muse and also thought he may have small bilateral inguinal hernias, on his physical exam.  Mr. Hernandez has not had any hernia type bulge or pain noted.    Right hydrocele versus spermatocele.  This started after his vasectomy years ago.  Vasectomy required a re-do.  He wondered if this extra manipulation contributed to the scrotal fluid collection.  This has enlarged over time, and is bulky and uncomfortable enough that he would now like this repaired.    PAST MEDICAL HX:  Past Medical History:   Diagnosis Date     Hyperlipemia      It band syndrome, right 11/23/2016     Labile hypertension      Plantar fasciitis 2/19/2018       PAST SURG HX:  Past Surgical History:   Procedure Laterality Date     COLONOSCOPY  2014     CRANIOTOMY, REPAIR ANEURYSM, COMBINED Right 08/09/2018    Procedure:  RIGHT CRANIOTOMY AND CLIPPING OF RIGHT CAROTID TERMINUS ANEURYSM, COMPLEX INTRAOPERATIVE ANGIOGRAMS;  Surgeon: Tigist Davidson MD;  Location: [facility];  Service:      IR CEREBRAL ANGIOGRAM  08/09/2018     rt craaniotomy for anerysm clipping  08/2018     thyroglossal duct cystectomy       THYROID SURGERY       TONSILLECTOMY       VASECTOMY          FAMILY HX:  Family History   Problem Relation Age of Onset     Coronary Artery Disease Maternal Grandmother      Other Cancer Father         liver     Hypertension Mother      Cerebrovascular Disease Mother      Asthma Sister      Diabetes No family hx of      Glaucoma No family hx of      Macular Degeneration No family hx of        SOCIAL HX:  Social History     Tobacco Use     Smoking status: Never     Smokeless tobacco: Never     Tobacco comments:     none   Substance Use Topics     Alcohol use: Not Currently     Alcohol/week: 7.0 standard drinks of alcohol     Types: 7 Standard  "drinks or equivalent per week     Drug use: No       MEDICATIONS:  Current Outpatient Medications   Medication Sig     creatine 400 MG capsule      EPINEPHrine (ANY BX GENERIC EQUIV) 0.3 MG/0.3ML injection 2-pack Inject 0.3 mLs (0.3 mg) into the muscle once as needed for anaphylaxis     fluocinonide (LIDEX) 0.05 % external cream Apply topically 3 times daily     glucosamine-chondroitin 500-400 MG CAPS per capsule Take 3 capsules by mouth daily     rosuvastatin (CRESTOR) 20 MG tablet Take 1 tablet (20 mg) by mouth daily     sildenafil (VIAGRA) 100 MG tablet Take 1 tablet (100 mg) by mouth daily as needed     sildenafil (VIAGRA) 100 MG tablet Take 1 tablet (100 mg) by mouth daily as needed (30min to 4 hrs before sex.) .  Do not use with nitroglycerin, terazosin or doxazosin.     Current Facility-Administered Medications   Medication     triamcinolone acetonide (KENALOG-10) injection 2 mg     triamcinolone acetonide (KENALOG-10) injection 4 mg       ALLERGIES:  Wasps [hornets], Cats, and Pollen extract      GENERAL PHYSICAL EXAM:     BP (!) 141/86 (BP Location: Right arm, Patient Position: Sitting, Cuff Size: Adult Large)   Pulse 76   Resp 17   Ht 1.803 m (5' 11\")   Wt 90.7 kg (200 lb)   SpO2 95%   BMI 27.89 kg/m     Constitutional: No acute distress. Well nourished.   PSYCH: normal mood and affect.  NEURO: normal gait, no focal deficits.   EYES: anicteric, EOMI, PERR.  CARDIOPULMONARY: breathing non-labored, pulse regular rate/rhythm, no peripheral edema.  GI: Abdomen soft, non-tender, nondistended   MUSCULOSKELETAL: normal limb proportions, no muscle wasting, no contractures.  SKIN: Normal virilized hair distribution, no lesions, warts or rashes over genitalia, abdomen extremities or face.  HEME/LYMPH: no ecchymosis, no lymphadenopathy in groin, no lymphedema.     EXAM:  Phallus circumcised, meatus adequate, no plaques palpated.   Left testis descended , size is normal  , consistency is normal  No " intra-testicular masses.   Right testis descended , size is normal  , consistency is normal . No intra-testicular masses.   There is a large lemon-sized right spermatocele, about 6cm in size.  Cord structures not remarkable.     Prostate exam: deferred     Possible slight bulge in the left inguinal canal with cough.  Right inguinal canal not examined.  No gross evidence of inguinal hernia on either side by my exam.      Imaging/labs:  Lab Results   Component Value Date    CR 0.97 11/02/2023    CR 0.92 07/13/2022    CR 0.98 04/23/2019    CR 1.06 08/30/2018     Lab Results   Component Value Date    PSA 2.06 11/16/2022       ASSESSMENT:     Right spermatocele causing pain/ discomfort.    Possible small inguinal hernia bilateral by Dr. Franco's exam.  No significant inguinal hernia by my exam today.  Discussed urologists generally don't perform inguinal hernia repair.  Offered referral to gen surg, but I don't think this is required at this time, as no clinical hernia and these are asymptomatic.    Normal PSA     PLAN:    Right spermatocele repair surgery orders placed.  Discussed risks, benefits, and alternatives.    Discussed risks of spermatocele repair including: hematocele, recurrence, testis pain, possible atrophy or loss of the testicle if arteries are injured.  All of these are low risk.    Scrotal ultrasound now for surgery planning.      Thank-you for the kind consultation.  Ham Shay MD     Urological Surgeon      Additional Coding Information:    Problems:  4 -- one or more chronic illnesses with exacerbation or side effects    Data Reviewed  PSA     Tests ordered/pending: scrotal ultrasound     Notes from other providers reviewed: Dr. Franco note 11/21/2023    Level of risk:  3 -- low risk (e.g., OTC medication or observation, minor surgery without risks)    Time spent:  25 minutes spent on the date of the encounter doing chart review, history and exam, documentation and further activities per the  note

## 2023-12-01 LAB
TESTOST FREE SERPL-MCNC: 5.34 NG/DL
TESTOST SERPL-MCNC: 206 NG/DL (ref 240–950)

## 2023-12-06 ENCOUNTER — TELEPHONE (OUTPATIENT)
Dept: UROLOGY | Facility: CLINIC | Age: 68
End: 2023-12-06
Payer: COMMERCIAL

## 2023-12-06 PROBLEM — N43.40 SPERMATOCELE: Status: ACTIVE | Noted: 2023-11-30

## 2023-12-06 NOTE — TELEPHONE ENCOUNTER
Patient is scheduled for a surgical procedure with Dr. Shay      Spoke with: Patient via phone      Date of Surgery/Procedure: Wednesday March 06, 2024    Location:  OR      Informed patient they will need an adult : Yes     Pre-op: Yes      H&P: Patient to schedule    Additional imaging/appointments:     Post-op: Thursday March 21, 2024     Additional comments:      Surgery packet: [employer]     Patient is aware that surgery time is tentative to change and to expect a call 3-1 business days from Pre Admission Nursing for instructions and arrival time

## 2024-01-05 ENCOUNTER — OFFICE VISIT (OUTPATIENT)
Dept: OPHTHALMOLOGY | Facility: CLINIC | Age: 69
End: 2024-01-05
Attending: OPTOMETRIST
Payer: COMMERCIAL

## 2024-01-05 DIAGNOSIS — H35.372 EPIRETINAL MEMBRANE, LEFT EYE: Primary | ICD-10-CM

## 2024-01-05 DIAGNOSIS — H25.13 AGE-RELATED NUCLEAR CATARACT OF BOTH EYES: ICD-10-CM

## 2024-01-05 DIAGNOSIS — H43.813 PVD (POSTERIOR VITREOUS DETACHMENT), BILATERAL: ICD-10-CM

## 2024-01-05 PROCEDURE — 92014 COMPRE OPH EXAM EST PT 1/>: CPT | Performed by: OPTOMETRIST

## 2024-01-05 PROCEDURE — G0463 HOSPITAL OUTPT CLINIC VISIT: HCPCS | Performed by: OPTOMETRIST

## 2024-01-05 PROCEDURE — 92015 DETERMINE REFRACTIVE STATE: CPT

## 2024-01-05 ASSESSMENT — REFRACTION_WEARINGRX
SPECS_TYPE: PAL
OD_ADD: +2.50
OS_AXIS: 115
OD_AXIS: 055
OS_ADD: +2.50
OD_SPHERE: -2.00
OS_SPHERE: -2.00
OS_CYLINDER: +0.25
OD_CYLINDER: +0.75

## 2024-01-05 ASSESSMENT — CONF VISUAL FIELD
OD_SUPERIOR_TEMPORAL_RESTRICTION: 0
OS_SUPERIOR_TEMPORAL_RESTRICTION: 0
OS_INFERIOR_TEMPORAL_RESTRICTION: 0
OD_SUPERIOR_NASAL_RESTRICTION: 0
OS_INFERIOR_NASAL_RESTRICTION: 0
METHOD: COUNTING FINGERS
OD_NORMAL: 1
OS_NORMAL: 1
OD_INFERIOR_NASAL_RESTRICTION: 0
OD_INFERIOR_TEMPORAL_RESTRICTION: 0
OS_SUPERIOR_NASAL_RESTRICTION: 0

## 2024-01-05 ASSESSMENT — VISUAL ACUITY
OD_CC: 20/20
OD_CC+: -3
OS_CC: 20/20
METHOD: SNELLEN - LINEAR
CORRECTION_TYPE: GLASSES
OS_CC+: -2

## 2024-01-05 ASSESSMENT — REFRACTION_MANIFEST
OS_CYLINDER: +0.25
OD_AXIS: 050
OS_ADD: +2.50
OS_SPHERE: -2.00
OD_ADD: +2.50
OD_CYLINDER: +0.75
OD_SPHERE: -2.00
OS_AXIS: 110

## 2024-01-05 ASSESSMENT — EXTERNAL EXAM - RIGHT EYE: OD_EXAM: NORMAL

## 2024-01-05 ASSESSMENT — SLIT LAMP EXAM - LIDS
COMMENTS: NORMAL
COMMENTS: NORMAL

## 2024-01-05 ASSESSMENT — EXTERNAL EXAM - LEFT EYE: OS_EXAM: NORMAL

## 2024-01-05 ASSESSMENT — TONOMETRY
OD_IOP_MMHG: 17
OS_IOP_MMHG: 15
IOP_METHOD: ICARE

## 2024-01-05 NOTE — PROGRESS NOTES
Assessment & Plan       Carlitos Hernandez is a 68 year old male with the following diagnoses:   1. Epiretinal membrane, left eye - Both Eyes    2. PVD (posterior vitreous detachment), bilateral - Both Eyes    3. Age-related nuclear cataract of both eyes - Both Eyes        ERM left eye follow  PVD Discussed vitreoul floaters and PVD   Educated patient on signs and symptoms of retinal detachment including increase in flashes, floaters, or a change in vision. If symptoms present, return to clinic immediately.   Cataracts worse each eye  Follow per 6 mths    Patient disposition:   No follow-ups on file.          Complete documentation of historical and exam elements from today's encounter can be found in the full encounter summary report (not reduplicated in this progress note). I personally obtained the chief complaint(s) and history of present illness.  I confirmed and edited as necessary the review of systems, past medical/surgical history, family history, social history, and examination findings as documented by others; and I examined the patient myself. I personally reviewed the relevant tests, images, and reports as documented above. I formulated and edited as necessary the assessment and plan and discussed the findings and management plan with the patient and family.  Dr. Antoni Love

## 2024-01-05 NOTE — PATIENT INSTRUCTIONS
ERM left eye follow  PVD Discussed vitreoul floaters and PVD   Educated patient on signs and symptoms of retinal detachment including increase in flashes, floaters, or a change in vision. If symptoms present, return to clinic immediately.   Cataracts worse each eye  Follow per 6 mths

## 2024-01-13 ENCOUNTER — MYC REFILL (OUTPATIENT)
Dept: INTERNAL MEDICINE | Facility: CLINIC | Age: 69
End: 2024-01-13
Payer: COMMERCIAL
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
## 2024-01-15 ENCOUNTER — HOSPITAL ENCOUNTER (OUTPATIENT)
Dept: ULTRASOUND IMAGING | Facility: HOSPITAL | Age: 69
Discharge: HOME | End: 2024-01-15
Attending: UROLOGY | Admitting: UROLOGY
Payer: COMMERCIAL

## 2024-01-15 DIAGNOSIS — N50.82 SCROTAL PAIN: ICD-10-CM

## 2024-01-15 DIAGNOSIS — N43.3 HYDROCELE, UNSPECIFIED HYDROCELE TYPE: ICD-10-CM

## 2024-01-15 PROCEDURE — 76870 US EXAM SCROTUM: CPT

## 2024-01-15 PROCEDURE — 93976 VASCULAR STUDY: CPT

## 2024-01-15 RX ORDER — FLUOCINONIDE 0.5 MG/G
CREAM TOPICAL 3 TIMES DAILY
Qty: 60 G | Refills: 11 | Status: SHIPPED | OUTPATIENT
Start: 2024-01-15

## 2024-01-15 NOTE — TELEPHONE ENCOUNTER
fluocinonide (LIDEX) 0.05 % external cream   Last Written Prescription Date:  10/19/2021  Last Fill Quantity: 60,   # refills: 11  Last Office Visit : 11/21/2023  Future Office visit:  None    Routing refill request to provider for review/approval because:  Gaps in refills.  Last filled Oct 2021.  Refer to clinic for review and refills per Providers orders for Pt care.     Stacie Fajardo RN  Central Triage Red Flags/Med Refills

## 2024-01-15 NOTE — RESULT ENCOUNTER NOTE
Dear Carlitos,     Here are your recent results.     Scrotal ultrasound results confirm a benign right spermatocele.  We will keep with plan for surgery  in March.    Please let us know if you have any questions or concerns.     Dedrick PEPPER

## 2024-02-14 ENCOUNTER — NURSE TRIAGE (OUTPATIENT)
Dept: NURSING | Facility: CLINIC | Age: 69
End: 2024-02-14
Payer: COMMERCIAL

## 2024-02-14 DIAGNOSIS — R04.0 BLEEDING NOSE: Primary | ICD-10-CM

## 2024-02-14 NOTE — TELEPHONE ENCOUNTER
Triage call    Patient calling a week ago he was at his cabin in [place] and had a nose bleed that he could not stop.  He went to the urgent care there and they were able to get it stopped and they gave him a list of things to get that would help him stop the bleeding. Since then he has had 4 more  bloody noses and it is difficult to get them to stop.  He does not have a history of bloody noses. He would like to have a referral for a ENT and  to know what Dr Franco would recommend. Routing to Provider.    Nimco Odonnell RN   [facility] Nurse Advisor  1:37 PM 2/14/2024      Reason for Disposition   Hard-to-stop nosebleeds are a chronic symptom (recurrent or ongoing AND lasting > 4 weeks)    Additional Information   Negative: Fainted (passed out), or too weak to stand following large blood loss   Negative: Sounds like a life-threatening emergency to the triager   Negative: Nosebleed followed nose injury   Negative: Bleeding present > 30 minutes and using correct method of direct pressure   Negative: Bleeding now and second call after being instructed in correct technique of direct pressure   Negative: Lightheadedness or dizziness   Negative: Pale skin (pallor) of new-onset or worsening   Negative: Has nasal packing (inserted by health care provider to control bleeding) and now has new rash   Negative: Has nasal packing and now has bleeding around the packing  (Exception: Few drops or ooze.)   Negative: Patient sounds very sick or weak to the triager   Negative: Large amount of blood has been lost (e.g., one cup)   Negative: Bleeding recurs 3 or more times in 24 hours despite direct pressure   Negative: Taking Coumadin (warfarin) or other strong blood thinner, or known bleeding disorder (e.g., thrombocytopenia)   Negative: Has skin bruises or bleeding gums that are not caused by an injury   Negative: Has nasal packing and now has fever > 100.4 F (38.0 C)   Negative: Patient wants to be seen    Negative: Has nasal packing (inserted by health care provider to control bleeding)    Protocols used: Nosebleed-A-OH

## 2024-02-15 ENCOUNTER — CARE COORDINATION (OUTPATIENT)
Dept: UROLOGY | Facility: CLINIC | Age: 69
End: 2024-02-15
Payer: COMMERCIAL

## 2024-02-21 ENCOUNTER — OFFICE VISIT (OUTPATIENT)
Dept: FAMILY MEDICINE | Facility: CLINIC | Age: 69
End: 2024-02-21
Payer: COMMERCIAL

## 2024-02-21 VITALS
HEART RATE: 78 BPM | RESPIRATION RATE: 16 BRPM | OXYGEN SATURATION: 96 % | HEIGHT: 71 IN | TEMPERATURE: 97.3 F | DIASTOLIC BLOOD PRESSURE: 82 MMHG | BODY MASS INDEX: 29.15 KG/M2 | WEIGHT: 208.2 LBS | SYSTOLIC BLOOD PRESSURE: 130 MMHG

## 2024-02-21 DIAGNOSIS — N43.40 SPERMATOCELE: ICD-10-CM

## 2024-02-21 DIAGNOSIS — Z01.818 PREOP GENERAL PHYSICAL EXAM: Primary | ICD-10-CM

## 2024-02-21 LAB
ALBUMIN UR-MCNC: NEGATIVE MG/DL
ANION GAP SERPL CALCULATED.3IONS-SCNC: 11 MMOL/L (ref 7–15)
APPEARANCE UR: CLEAR
BACTERIA #/AREA URNS HPF: ABNORMAL /HPF
BILIRUB UR QL STRIP: NEGATIVE
BUN SERPL-MCNC: 19.3 MG/DL (ref 8–23)
CALCIUM SERPL-MCNC: 9.9 MG/DL (ref 8.8–10.2)
CHLORIDE SERPL-SCNC: 104 MMOL/L (ref 98–107)
COLOR UR AUTO: YELLOW
CREAT SERPL-MCNC: 1.05 MG/DL (ref 0.67–1.17)
DEPRECATED HCO3 PLAS-SCNC: 26 MMOL/L (ref 22–29)
EGFRCR SERPLBLD CKD-EPI 2021: 76 ML/MIN/1.73M2
ERYTHROCYTE [DISTWIDTH] IN BLOOD BY AUTOMATED COUNT: 12.1 % (ref 10–15)
GLUCOSE SERPL-MCNC: 102 MG/DL (ref 70–99)
GLUCOSE UR STRIP-MCNC: NEGATIVE MG/DL
HCT VFR BLD AUTO: 43.1 % (ref 40–53)
HGB BLD-MCNC: 14.7 G/DL (ref 13.3–17.7)
HGB UR QL STRIP: NEGATIVE
KETONES UR STRIP-MCNC: ABNORMAL MG/DL
LEUKOCYTE ESTERASE UR QL STRIP: NEGATIVE
MCH RBC QN AUTO: 30.6 PG (ref 26.5–33)
MCHC RBC AUTO-ENTMCNC: 34.1 G/DL (ref 31.5–36.5)
MCV RBC AUTO: 90 FL (ref 78–100)
MUCOUS THREADS #/AREA URNS LPF: PRESENT /LPF
NITRATE UR QL: NEGATIVE
PH UR STRIP: 5.5 [PH] (ref 5–7)
PLATELET # BLD AUTO: 196 10E3/UL (ref 150–450)
POTASSIUM SERPL-SCNC: 4.7 MMOL/L (ref 3.4–5.3)
RBC # BLD AUTO: 4.81 10E6/UL (ref 4.4–5.9)
RBC #/AREA URNS AUTO: ABNORMAL /HPF
SODIUM SERPL-SCNC: 141 MMOL/L (ref 135–145)
SP GR UR STRIP: 1.02 (ref 1–1.03)
SQUAMOUS #/AREA URNS AUTO: ABNORMAL /LPF
UROBILINOGEN UR STRIP-ACNC: 0.2 E.U./DL
WBC # BLD AUTO: 4.2 10E3/UL (ref 4–11)
WBC #/AREA URNS AUTO: ABNORMAL /HPF

## 2024-02-21 PROCEDURE — 81001 URINALYSIS AUTO W/SCOPE: CPT | Performed by: NURSE PRACTITIONER

## 2024-02-21 PROCEDURE — 36415 COLL VENOUS BLD VENIPUNCTURE: CPT | Performed by: NURSE PRACTITIONER

## 2024-02-21 PROCEDURE — 99214 OFFICE O/P EST MOD 30 MIN: CPT | Performed by: NURSE PRACTITIONER

## 2024-02-21 PROCEDURE — 85027 COMPLETE CBC AUTOMATED: CPT | Performed by: NURSE PRACTITIONER

## 2024-02-21 PROCEDURE — 80048 BASIC METABOLIC PNL TOTAL CA: CPT | Performed by: NURSE PRACTITIONER

## 2024-02-21 ASSESSMENT — PAIN SCALES - GENERAL: PAINLEVEL: NO PAIN (0)

## 2024-02-21 NOTE — PROGRESS NOTES
Preoperative Evaluation  [facility]  [address] [facility] [address]  Phone: [phone]  Primary Provider: Ovidio Franco  Pre-op Performing Provider: FARA LIU    Carlitos is a 68 year old, presenting for the following:  Pre-Op Exam and Nose Bleeds (Has been having nose bleeds over the past 2 weeks every few day.  Was seen in  for the first episode. )        2/21/2024    10:08 AM   Additional Questions   Roomed by Bonnie BURCH   Accompanied by self     Surgical Information  Surgery/Procedure: Excision, spermatocele, right  [facility]  Surgeon: Dr. Shay  Surgery Date: 3/6/24  Time of Surgery: 12:00  Where patient plans to recover: At home with family  Fax number for surgical facility: Note does not need to be faxed, will be available electronically in Epic.    Assessment & Plan     The proposed surgical procedure is considered INTERMEDIATE risk.    Preop general physical exam    - CBC with platelets; Future  - Basic metabolic panel  (Ca, Cl, CO2, Creat, Gluc, K, Na, BUN); Future  - CBC with platelets  - Basic metabolic panel  (Ca, Cl, CO2, Creat, Gluc, K, Na, BUN)    Spermatocele  -normal exam and labs, patient is cleared for surgery   - UA with Microscopic reflex to Culture - lab collect; Future  - UA with Microscopic reflex to Culture - lab collect  - UA Microscopic with Reflex to Culture     - No identified additional risk factors other than previously addressed    Antiplatelet or Anticoagulation Medication Instructions   - Patient is on no antiplatelet or anticoagulation medications.    Additional Medication Instructions   - Herbal medications and vitamins: HOLD 14 days prior to surgery.    Recommendation  APPROVAL GIVEN to proceed with proposed procedure, without further diagnostic evaluation.        Subjective       HPI related to upcoming procedure: spermatocele         2/21/2024    10:03 AM   Preop Questions   1. Have you ever had a  heart attack or stroke? No   2. Have you ever had surgery on your heart or blood vessels, such as a stent placement, a coronary artery bypass, or surgery on an artery in your head, neck, heart, or legs? YES - had brain aneurysm repair surgery in 2018. No history of known CAD    3. Do you have chest pain with activity? No   4. Do you have a history of  heart failure? No   5. Do you currently have a cold, bronchitis or symptoms of other infection? No   6. Do you have a cough, shortness of breath, or wheezing? No   7. Do you or anyone in your family have previous history of blood clots? No   8. Do you or does anyone in your family have a serious bleeding problem such as prolonged bleeding following surgeries or cuts? No   9. Have you ever had problems with anemia or been told to take iron pills? No   10. Have you had any abnormal blood loss such as black, tarry or bloody stools? No   11. Have you ever had a blood transfusion? No   12. Are you willing to have a blood transfusion if it is medically needed before, during, or after your surgery? Yes   13. Have you or any of your relatives ever had problems with anesthesia? No   14. Do you have sleep apnea, excessive snoring or daytime drowsiness? No   15. Do you have any artifical heart valves or other implanted medical devices like a pacemaker, defibrillator, or continuous glucose monitor? No   16. Do you have artificial joints? No   17. Are you allergic to latex? No     Preoperative Review of    reviewed - no record of controlled substances prescribed.    Patient Active Problem List    Diagnosis Date Noted    Spermatocele 11/30/2023     Priority: Medium    Plantar fasciitis 02/19/2018     Priority: Medium    Hyperlipidemia, unspecified hyperlipidemia type 11/23/2016     Priority: Medium    It band syndrome, right 11/23/2016     Priority: Medium    Hyperlipemia      Priority: Medium      Past Medical History:   Diagnosis Date    Hyperlipemia     It band syndrome,  right 11/23/2016    Labile hypertension     Plantar fasciitis 02/19/2018     Past Surgical History:   Procedure Laterality Date    COLONOSCOPY  2014    CRANIOTOMY, REPAIR ANEURYSM, COMBINED Right 08/09/2018    Procedure:  RIGHT CRANIOTOMY AND CLIPPING OF RIGHT CAROTID TERMINUS ANEURYSM, COMPLEX INTRAOPERATIVE ANGIOGRAMS;  Surgeon: Tigist Davidson MD;  Location: [facility];  Service:     IR CEREBRAL ANGIOGRAM  08/09/2018    rt craaniotomy for anerysm clipping  08/2018    thyroglossal duct cystectomy      THYROID SURGERY      TONSILLECTOMY      VASECTOMY       Current Outpatient Medications   Medication Sig Dispense Refill    creatine 400 MG capsule       EPINEPHrine (ANY BX GENERIC EQUIV) 0.3 MG/0.3ML injection 2-pack Inject 0.3 mLs (0.3 mg) into the muscle once as needed for anaphylaxis 2 each 1    fluocinonide (LIDEX) 0.05 % external cream Apply topically 3 times daily 60 g 11    glucosamine-chondroitin 500-400 MG CAPS per capsule Take 3 capsules by mouth daily 200 capsule 0    rosuvastatin (CRESTOR) 20 MG tablet Take 1 tablet (20 mg) by mouth daily 90 tablet 3    sildenafil (VIAGRA) 100 MG tablet Take 1 tablet (100 mg) by mouth daily as needed (30min to 4 hrs before sex.) .  Do not use with nitroglycerin, terazosin or doxazosin. 18 tablet 3    sildenafil (VIAGRA) 100 MG tablet Take 1 tablet (100 mg) by mouth daily as needed 30 tablet 4       Allergies   Allergen Reactions    Wasps [Hornets] Other (See Comments) and Hives     Decreased heart rate.     Cats Blisters    Pollen Extract         Social History     Tobacco Use    Smoking status: Never    Smokeless tobacco: Never    Tobacco comments:     none   Substance Use Topics    Alcohol use: Not Currently     Alcohol/week: 7.0 standard drinks of alcohol     Types: 7 Standard drinks or equivalent per week     History   Drug Use No         Review of Systems    Review of Systems  Constitutional, HEENT, cardiovascular, pulmonary, gi and gu systems are  "negative, except as otherwise noted.  Objective    There were no vitals taken for this visit.   Estimated body mass index is 27.89 kg/m  as calculated from the following:    Height as of 11/30/23: 1.803 m (5' 11\").    Weight as of 11/30/23: 90.7 kg (200 lb).  Physical Exam  GENERAL: alert and no distress  EYES: Eyes grossly normal to inspection, PERRL and conjunctivae and sclerae normal  HENT: ear canals and TM's normal, nose and mouth without ulcers or lesions  NECK: no adenopathy, no asymmetry, masses, or scars  RESP: lungs clear to auscultation - no rales, rhonchi or wheezes  CV: regular rate and rhythm, normal S1 S2, no S3 or S4, no murmur, click or rub, no peripheral edema   MS: no gross musculoskeletal defects noted, no edema  SKIN: no suspicious lesions or rashes  NEURO: Normal strength and tone, mentation intact and speech normal  PSYCH: mentation appears normal, affect normal/bright    Recent Labs   Lab Test 11/02/23  0808 07/13/22  1741   HGB  --  14.8   PLT  --  198    144   POTASSIUM 4.3 3.8   CR 0.97 0.92   A1C 5.6  --         Diagnostics  Recent Results (from the past 24 hour(s))   CBC with platelets    Collection Time: 02/21/24 10:47 AM   Result Value Ref Range    WBC Count 4.2 4.0 - 11.0 10e3/uL    RBC Count 4.81 4.40 - 5.90 10e6/uL    Hemoglobin 14.7 13.3 - 17.7 g/dL    Hematocrit 43.1 40.0 - 53.0 %    MCV 90 78 - 100 fL    MCH 30.6 26.5 - 33.0 pg    MCHC 34.1 31.5 - 36.5 g/dL    RDW 12.1 10.0 - 15.0 %    Platelet Count 196 150 - 450 10e3/uL   UA with Microscopic reflex to Culture - lab collect    Collection Time: 02/21/24 10:47 AM    Specimen: Urine, Clean Catch   Result Value Ref Range    Color Urine Yellow Colorless, Straw, Light Yellow, Yellow    Appearance Urine Clear Clear    Glucose Urine Negative Negative mg/dL    Bilirubin Urine Negative Negative    Ketones Urine Trace (A) Negative mg/dL    Specific Gravity Urine 1.025 1.003 - 1.035    Blood Urine Negative Negative    pH Urine 5.5 5.0 " - 7.0    Protein Albumin Urine Negative Negative mg/dL    Urobilinogen Urine 0.2 0.2, 1.0 E.U./dL    Nitrite Urine Negative Negative    Leukocyte Esterase Urine Negative Negative   UA Microscopic with Reflex to Culture    Collection Time: 02/21/24 10:47 AM   Result Value Ref Range    Bacteria Urine Few (A) None Seen /HPF    RBC Urine 0-2 0-2 /HPF /HPF    WBC Urine 0-5 0-5 /HPF /HPF    Squamous Epithelials Urine Few (A) None Seen /LPF    Mucus Urine Present (A) None Seen /LPF      No EKG required, no history of coronary heart disease, significant arrhythmia, peripheral arterial disease or other structural heart disease.    Revised Cardiac Risk Index (RCRI)  The patient has the following serious cardiovascular risks for perioperative complications:   - No serious cardiac risks = 0 points     RCRI Interpretation: 0 points: Class I (very low risk - 0.4% complication rate)         Signed Electronically by: CHERYL Peterson CNP  Copy of this evaluation report is provided to requesting physician.

## 2024-02-23 NOTE — TELEPHONE ENCOUNTER
FUTURE VISIT INFORMATION      FUTURE VISIT INFORMATION:  Date: 5/7/24  Time: 1:20pm  Location: [facility]  REFERRAL INFORMATION:  Referring provider:  Ovidio Franco MD   Referring providers clinic:  [facility]   Reason for visit/diagnosis  Per Pt, dx bleeding nose, referral from PCP recs in epic     RECORDS REQUESTED FROM:       Clinic name Comments Records Status Imaging Status   [facility]  2/14/24- ov Ovidio Franco MD  Epic

## 2024-03-05 ENCOUNTER — ANESTHESIA EVENT (OUTPATIENT)
Dept: SURGERY | Facility: AMBULATORY SURGERY CENTER | Age: 69
End: 2024-03-05
Payer: COMMERCIAL

## 2024-03-06 ENCOUNTER — ANESTHESIA (OUTPATIENT)
Dept: SURGERY | Facility: AMBULATORY SURGERY CENTER | Age: 69
End: 2024-03-06
Payer: COMMERCIAL

## 2024-03-06 ENCOUNTER — HOSPITAL ENCOUNTER (OUTPATIENT)
Facility: AMBULATORY SURGERY CENTER | Age: 69
Discharge: HOME | End: 2024-03-06
Attending: UROLOGY | Admitting: UROLOGY
Payer: COMMERCIAL

## 2024-03-06 VITALS
OXYGEN SATURATION: 94 % | DIASTOLIC BLOOD PRESSURE: 81 MMHG | HEART RATE: 67 BPM | SYSTOLIC BLOOD PRESSURE: 130 MMHG | TEMPERATURE: 97.6 F | RESPIRATION RATE: 10 BRPM

## 2024-03-06 DIAGNOSIS — M76.31 IT BAND SYNDROME, RIGHT: ICD-10-CM

## 2024-03-06 DIAGNOSIS — N43.40 SPERMATOCELE: Primary | ICD-10-CM

## 2024-03-06 DIAGNOSIS — E78.5 HYPERLIPIDEMIA, UNSPECIFIED HYPERLIPIDEMIA TYPE: ICD-10-CM

## 2024-03-06 DIAGNOSIS — M72.2 PLANTAR FASCIITIS: ICD-10-CM

## 2024-03-06 PROCEDURE — 54840 REMOVE EPIDIDYMIS LESION: CPT | Performed by: ANESTHESIOLOGY

## 2024-03-06 PROCEDURE — G8907 PT DOC NO EVENTS ON DISCHARG: HCPCS

## 2024-03-06 PROCEDURE — 54840 REMOVE EPIDIDYMIS LESION: CPT | Performed by: NURSE ANESTHETIST, CERTIFIED REGISTERED

## 2024-03-06 PROCEDURE — G8916 PT W IV AB GIVEN ON TIME: HCPCS

## 2024-03-06 PROCEDURE — 54840 REMOVE EPIDIDYMIS LESION: CPT | Mod: RT

## 2024-03-06 RX ORDER — SODIUM CHLORIDE, SODIUM LACTATE, POTASSIUM CHLORIDE, CALCIUM CHLORIDE 600; 310; 30; 20 MG/100ML; MG/100ML; MG/100ML; MG/100ML
INJECTION, SOLUTION INTRAVENOUS CONTINUOUS PRN
Status: DISCONTINUED | OUTPATIENT
Start: 2024-03-06 | End: 2024-03-06

## 2024-03-06 RX ORDER — DEXAMETHASONE SODIUM PHOSPHATE 4 MG/ML
INJECTION, SOLUTION INTRA-ARTICULAR; INTRALESIONAL; INTRAMUSCULAR; INTRAVENOUS; SOFT TISSUE PRN
Status: DISCONTINUED | OUTPATIENT
Start: 2024-03-06 | End: 2024-03-06

## 2024-03-06 RX ORDER — NALOXONE HYDROCHLORIDE 0.4 MG/ML
0.1 INJECTION, SOLUTION INTRAMUSCULAR; INTRAVENOUS; SUBCUTANEOUS
Status: DISCONTINUED | OUTPATIENT
Start: 2024-03-06 | End: 2024-03-07 | Stop reason: HOSPADM

## 2024-03-06 RX ORDER — LIDOCAINE HYDROCHLORIDE 20 MG/ML
INJECTION, SOLUTION INFILTRATION; PERINEURAL PRN
Status: DISCONTINUED | OUTPATIENT
Start: 2024-03-06 | End: 2024-03-06

## 2024-03-06 RX ORDER — FENTANYL CITRATE 50 UG/ML
INJECTION, SOLUTION INTRAMUSCULAR; INTRAVENOUS PRN
Status: DISCONTINUED | OUTPATIENT
Start: 2024-03-06 | End: 2024-03-06

## 2024-03-06 RX ORDER — ONDANSETRON 2 MG/ML
4 INJECTION INTRAMUSCULAR; INTRAVENOUS EVERY 30 MIN PRN
Status: DISCONTINUED | OUTPATIENT
Start: 2024-03-06 | End: 2024-03-07 | Stop reason: HOSPADM

## 2024-03-06 RX ORDER — FENTANYL CITRATE 50 UG/ML
25 INJECTION, SOLUTION INTRAMUSCULAR; INTRAVENOUS EVERY 5 MIN PRN
Status: DISCONTINUED | OUTPATIENT
Start: 2024-03-06 | End: 2024-03-07 | Stop reason: HOSPADM

## 2024-03-06 RX ORDER — OXYCODONE HYDROCHLORIDE 5 MG/1
10 TABLET ORAL
Status: DISCONTINUED | OUTPATIENT
Start: 2024-03-06 | End: 2024-03-07 | Stop reason: HOSPADM

## 2024-03-06 RX ORDER — CEFAZOLIN SODIUM 2 G/50ML
2 SOLUTION INTRAVENOUS
Status: COMPLETED | OUTPATIENT
Start: 2024-03-06 | End: 2024-03-06

## 2024-03-06 RX ORDER — CEFAZOLIN SODIUM 2 G/50ML
2 SOLUTION INTRAVENOUS SEE ADMIN INSTRUCTIONS
Status: DISCONTINUED | OUTPATIENT
Start: 2024-03-06 | End: 2024-03-07 | Stop reason: HOSPADM

## 2024-03-06 RX ORDER — ONDANSETRON 4 MG/1
4 TABLET, ORALLY DISINTEGRATING ORAL EVERY 30 MIN PRN
Status: DISCONTINUED | OUTPATIENT
Start: 2024-03-06 | End: 2024-03-07 | Stop reason: HOSPADM

## 2024-03-06 RX ORDER — ACETAMINOPHEN 325 MG/1
975 TABLET ORAL ONCE
Status: COMPLETED | OUTPATIENT
Start: 2024-03-06 | End: 2024-03-06

## 2024-03-06 RX ORDER — SODIUM CHLORIDE, SODIUM LACTATE, POTASSIUM CHLORIDE, CALCIUM CHLORIDE 600; 310; 30; 20 MG/100ML; MG/100ML; MG/100ML; MG/100ML
INJECTION, SOLUTION INTRAVENOUS CONTINUOUS
Status: DISCONTINUED | OUTPATIENT
Start: 2024-03-06 | End: 2024-03-07 | Stop reason: HOSPADM

## 2024-03-06 RX ORDER — BUPIVACAINE HYDROCHLORIDE 5 MG/ML
INJECTION, SOLUTION PERINEURAL PRN
Status: DISCONTINUED | OUTPATIENT
Start: 2024-03-06 | End: 2024-03-06 | Stop reason: HOSPADM

## 2024-03-06 RX ORDER — GLYCOPYRROLATE 0.2 MG/ML
INJECTION, SOLUTION INTRAMUSCULAR; INTRAVENOUS PRN
Status: DISCONTINUED | OUTPATIENT
Start: 2024-03-06 | End: 2024-03-06

## 2024-03-06 RX ORDER — ONDANSETRON 2 MG/ML
INJECTION INTRAMUSCULAR; INTRAVENOUS PRN
Status: DISCONTINUED | OUTPATIENT
Start: 2024-03-06 | End: 2024-03-06

## 2024-03-06 RX ORDER — PROPOFOL 10 MG/ML
INJECTION, EMULSION INTRAVENOUS PRN
Status: DISCONTINUED | OUTPATIENT
Start: 2024-03-06 | End: 2024-03-06

## 2024-03-06 RX ORDER — OXYCODONE HYDROCHLORIDE 5 MG/1
5 TABLET ORAL
Status: COMPLETED | OUTPATIENT
Start: 2024-03-06 | End: 2024-03-06

## 2024-03-06 RX ORDER — LIDOCAINE 40 MG/G
CREAM TOPICAL
Status: DISCONTINUED | OUTPATIENT
Start: 2024-03-06 | End: 2024-03-07 | Stop reason: HOSPADM

## 2024-03-06 RX ORDER — OXYCODONE HYDROCHLORIDE 5 MG/1
5-10 TABLET ORAL EVERY 6 HOURS PRN
Qty: 12 TABLET | Refills: 0 | Status: SHIPPED | OUTPATIENT
Start: 2024-03-06

## 2024-03-06 RX ORDER — BACITRACIN ZINC 500 [USP'U]/G
OINTMENT TOPICAL PRN
Status: DISCONTINUED | OUTPATIENT
Start: 2024-03-06 | End: 2024-03-06 | Stop reason: HOSPADM

## 2024-03-06 RX ORDER — DEXAMETHASONE SODIUM PHOSPHATE 4 MG/ML
4 INJECTION, SOLUTION INTRA-ARTICULAR; INTRALESIONAL; INTRAMUSCULAR; INTRAVENOUS; SOFT TISSUE
Status: DISCONTINUED | OUTPATIENT
Start: 2024-03-06 | End: 2024-03-07 | Stop reason: HOSPADM

## 2024-03-06 RX ORDER — FENTANYL CITRATE 50 UG/ML
25 INJECTION, SOLUTION INTRAMUSCULAR; INTRAVENOUS
Status: DISCONTINUED | OUTPATIENT
Start: 2024-03-06 | End: 2024-03-07 | Stop reason: HOSPADM

## 2024-03-06 RX ORDER — FENTANYL CITRATE 50 UG/ML
50 INJECTION, SOLUTION INTRAMUSCULAR; INTRAVENOUS EVERY 5 MIN PRN
Status: DISCONTINUED | OUTPATIENT
Start: 2024-03-06 | End: 2024-03-07 | Stop reason: HOSPADM

## 2024-03-06 RX ORDER — PROPOFOL 10 MG/ML
INJECTION, EMULSION INTRAVENOUS CONTINUOUS PRN
Status: DISCONTINUED | OUTPATIENT
Start: 2024-03-06 | End: 2024-03-06

## 2024-03-06 RX ADMIN — SODIUM CHLORIDE, SODIUM LACTATE, POTASSIUM CHLORIDE, CALCIUM CHLORIDE: 600; 310; 30; 20 INJECTION, SOLUTION INTRAVENOUS at 11:22

## 2024-03-06 RX ADMIN — FENTANYL CITRATE 50 MCG: 50 INJECTION, SOLUTION INTRAMUSCULAR; INTRAVENOUS at 14:37

## 2024-03-06 RX ADMIN — FENTANYL CITRATE 25 MCG: 50 INJECTION, SOLUTION INTRAMUSCULAR; INTRAVENOUS at 13:11

## 2024-03-06 RX ADMIN — DEXAMETHASONE SODIUM PHOSPHATE 4 MG: 4 INJECTION, SOLUTION INTRA-ARTICULAR; INTRALESIONAL; INTRAMUSCULAR; INTRAVENOUS; SOFT TISSUE at 12:48

## 2024-03-06 RX ADMIN — SODIUM CHLORIDE, SODIUM LACTATE, POTASSIUM CHLORIDE, CALCIUM CHLORIDE: 600; 310; 30; 20 INJECTION, SOLUTION INTRAVENOUS at 12:25

## 2024-03-06 RX ADMIN — FENTANYL CITRATE 25 MCG: 50 INJECTION, SOLUTION INTRAMUSCULAR; INTRAVENOUS at 12:37

## 2024-03-06 RX ADMIN — PROPOFOL 200 MG: 10 INJECTION, EMULSION INTRAVENOUS at 12:37

## 2024-03-06 RX ADMIN — FENTANYL CITRATE 50 MCG: 50 INJECTION, SOLUTION INTRAMUSCULAR; INTRAVENOUS at 14:42

## 2024-03-06 RX ADMIN — ONDANSETRON 4 MG: 2 INJECTION INTRAMUSCULAR; INTRAVENOUS at 12:48

## 2024-03-06 RX ADMIN — PROPOFOL 25 MCG/KG/MIN: 10 INJECTION, EMULSION INTRAVENOUS at 12:40

## 2024-03-06 RX ADMIN — Medication 0.4 MG: at 14:55

## 2024-03-06 RX ADMIN — GLYCOPYRROLATE 0.1 MG: 0.2 INJECTION, SOLUTION INTRAMUSCULAR; INTRAVENOUS at 13:08

## 2024-03-06 RX ADMIN — GLYCOPYRROLATE 0.1 MG: 0.2 INJECTION, SOLUTION INTRAMUSCULAR; INTRAVENOUS at 13:04

## 2024-03-06 RX ADMIN — ACETAMINOPHEN 975 MG: 325 TABLET ORAL at 11:20

## 2024-03-06 RX ADMIN — LIDOCAINE HYDROCHLORIDE 80 MG: 20 INJECTION, SOLUTION INFILTRATION; PERINEURAL at 12:37

## 2024-03-06 RX ADMIN — CEFAZOLIN SODIUM 2 G: 2 SOLUTION INTRAVENOUS at 12:20

## 2024-03-06 RX ADMIN — Medication 0.4 MG: at 14:44

## 2024-03-06 RX ADMIN — Medication 0.4 MG: at 14:49

## 2024-03-06 RX ADMIN — OXYCODONE HYDROCHLORIDE 5 MG: 5 TABLET ORAL at 14:29

## 2024-03-06 RX ADMIN — PROPOFOL 150 MG: 10 INJECTION, EMULSION INTRAVENOUS at 12:41

## 2024-03-06 NOTE — ANESTHESIA PROCEDURE NOTES
Airway       Patient location during procedure: OR  Staff -        CRNA: Stacie Eddy APRN CRNA       Performed By: CRNA  Consent for Airway        Urgency: elective  Indications and Patient Condition       Indications for airway management: mary-procedural       Induction type:intravenous       Mask difficulty assessment: 2 - vent by mask + OA or adjuvant +/- NMBA    Final Airway Details       Final airway type: supraglottic airway    Supraglottic Airway Details        Type: LMA       Brand: I-Gel       LMA size: 5    Post intubation assessment        Placement verified by: capnometry, equal breath sounds and chest rise        Number of attempts at approach: 1       Secured with: silk tape       Ease of procedure: easy       Dentition: Intact and Unchanged

## 2024-03-06 NOTE — OP NOTE
PREOPERATIVE DIAGNOSIS: Right spermatocele .  POSTOPERATIVE DIAGNOSIS: same    PROCEDURES PERFORMED:   1. right spermatocele repair     STAFF SURGEON: Dr. Ham Shay, present for entire case.   RESIDENT(S): N/A   ANESTHESIA: LMA general  ESTIMATED BLOOD LOSS: 2 mL.   IV FLUIDS: see anesthesia record  COMPLICATIONS: None.    SPECIMEN:  none   SIGNIFICANT FINDINGS:   ~6cm right spermatocele  with about 50mL clear fluid drained from sac     BRIEF OPERATIVE INDICATIONS:   Carlitos Hernandez is a 68 year old male   with history of right spermatocele. Patient presented for evaluation of persistent symptomatic spermatocele. After discussion of all risks, benefits and alternatives, the patient elected to proceed with the procedure as listed above     Procedure in Detail: After informed consent was obtained, the patient was taken to the operating room and placed supine. Pneumoboots were applied, preoperative antibiotics were administered IV, and the genitals were prepped and draped in the supine position. A timeout was performed with the operating room staff.     The case was begun by marking a midline raphe incision.  0.5% Marcaine was used for anesthesia around the incision and for a right  spermatic cord block.  The skin was incised sharply and was opened full-thickness exposing the testicle and tunica vaginalis..     The tunica vaginalis was then opened, and the spermatocele dissected free from the testis, taking care to preserve the integrity of the testicle, hilar vessels, and epididymis.  Mostly sharp dissection was used.  Cautery was used sparingly as needed for hemostasis.   The spermatocele was dissected down to a thin stalk.  The sac was tied at the base with a 4-0 Vicryl tie.  The tissue was discarded.   Hemostatis was achieved and the incision was closed in layers after saline irrigation.  The epididymal tunic was closed with 4-0 Vicryl in a running fashion. Tunica vaginalis was closed with 3-0 Vicryl.  The darelvas  muscle was re-approximated using a running 3-0 chromic in 2 layers. Skin was closed using 4-0 monocryl in a running horizontal mattress fashion.     Bacitracin, fluffs, and a supporter were applied. The patient was awoken from anesthesia without complication and transported to recovery in stable condition.      I was present and scrubbed for the entire procedure.  Ham Shay MD  Urology Staff

## 2024-03-06 NOTE — ANESTHESIA PREPROCEDURE EVALUATION
Anesthesia Pre-Procedure Evaluation    Patient: Carlitos Hernandez   MRN: [id number] : 1955        Procedure : Procedure(s):  EXCISION, SPERMATOCELE          Past Medical History:   Diagnosis Date     Hyperlipemia      It band syndrome, right 2016     Labile hypertension      Plantar fasciitis 2018      Past Surgical History:   Procedure Laterality Date     COLONOSCOPY       CRANIOTOMY, REPAIR ANEURYSM, COMBINED Right 2018    Procedure:  RIGHT CRANIOTOMY AND CLIPPING OF RIGHT CAROTID TERMINUS ANEURYSM, COMPLEX INTRAOPERATIVE ANGIOGRAMS;  Surgeon: Tigist Davidson MD;  Location: [facility] OR;  Service:      IR CEREBRAL ANGIOGRAM  2018     rt craaniotomy for anerysm clipping  2018     thyroglossal duct cystectomy       THYROID SURGERY       TONSILLECTOMY       VASECTOMY        Allergies   Allergen Reactions     Wasps [Hornets] Other (See Comments) and Hives     Decreased heart rate.      Cats Blisters     Pollen Extract       Social History     Tobacco Use     Smoking status: Never     Smokeless tobacco: Never     Tobacco comments:     none   Substance Use Topics     Alcohol use: Not Currently     Alcohol/week: 7.0 standard drinks of alcohol     Types: 7 Standard drinks or equivalent per week      Wt Readings from Last 1 Encounters:   24 94.4 kg (208 lb 3.2 oz)        Anesthesia Evaluation   Pt has had prior anesthetic. Type: General.    No history of anesthetic complications       ROS/MED HX  ENT/Pulmonary:  - neg pulmonary ROS     Neurologic: Comment: Craniotomy for aneurysm repair      Cardiovascular:     (+) Dyslipidemia hypertension- -   -  - -                                      METS/Exercise Tolerance:     Hematologic:  - neg hematologic  ROS     Musculoskeletal:  - neg musculoskeletal ROS     GI/Hepatic:  - neg GI/hepatic ROS     Renal/Genitourinary:  - neg Renal ROS     Endo:  - neg endo ROS     Psychiatric/Substance Use:  - neg psychiatric ROS    "  Infectious Disease:  - neg infectious disease ROS     Malignancy:  - neg malignancy ROS     Other:  - neg other ROS        Physical Exam    Airway  airway exam normal           Respiratory Devices and Support         Dental       (+) Completely normal teeth      Cardiovascular   cardiovascular exam normal          Pulmonary   pulmonary exam normal            OUTSIDE LABS:  CBC:   Lab Results   Component Value Date    WBC 4.2 02/21/2024    WBC 6.0 07/13/2022    HGB 14.7 02/21/2024    HGB 14.8 07/13/2022    HCT 43.1 02/21/2024    HCT 42.0 07/13/2022     02/21/2024     07/13/2022     BMP:   Lab Results   Component Value Date     02/21/2024     11/02/2023    POTASSIUM 4.7 02/21/2024    POTASSIUM 4.3 11/02/2023    CHLORIDE 104 02/21/2024    CHLORIDE 105 11/02/2023    CO2 26 02/21/2024    CO2 27 11/02/2023    BUN 19.3 02/21/2024    BUN 16.6 11/02/2023    CR 1.05 02/21/2024    CR 0.97 11/02/2023     (H) 02/21/2024     (H) 11/02/2023     COAGS:   Lab Results   Component Value Date    PTT 25 08/06/2018    INR 1.05 08/06/2018     POC: No results found for: \"BGM\", \"HCG\", \"HCGS\"  HEPATIC:   Lab Results   Component Value Date    ALBUMIN 4.4 11/02/2023    PROTTOTAL 7.0 11/02/2023    ALT 36 11/02/2023    AST 29 11/02/2023    ALKPHOS 78 11/02/2023    BILITOTAL 0.7 11/02/2023     OTHER:   Lab Results   Component Value Date    PH 7.35 (L) 08/09/2018    LACT 1.5 08/11/2018    A1C 5.6 11/02/2023    CATALINA 9.9 02/21/2024    PHOS 4.3 08/13/2018    MAG 2.2 08/13/2018    TSH 2.40 07/13/2022    CRP <2.9 07/13/2022    SED 7 07/13/2022       Anesthesia Plan    ASA Status:  3    NPO Status:  NPO Appropriate    Anesthesia Type: General.     - Airway: LMA   Induction: Intravenous, Propofol.   Maintenance: Balanced.        Consents    Anesthesia Plan(s) and associated risks, benefits, and realistic alternatives discussed. Questions answered and patient/representative(s) expressed understanding.     - " "Discussed:     - Discussed with:  Patient, Spouse      - Extended Intubation/Ventilatory Support Discussed: No.      - Patient is DNR/DNI Status: No     Use of blood products discussed: No .     Postoperative Care    Pain management: IV analgesics, Oral pain medications.   PONV prophylaxis: Ondansetron (or other 5HT-3), Background Propofol Infusion, Dexamethasone or Solumedrol     Comments:             Nasim Dugan MD    I have reviewed the pertinent notes and labs in the chart from the past 30 days and (re)examined the patient.  Any updates or changes from those notes are reflected in this note.              # Overweight: Estimated body mass index is 28.83 kg/m  as calculated from the following:    Height as of 2/21/24: 1.81 m (5' 11.25\").    Weight as of 2/21/24: 94.4 kg (208 lb 3.2 oz).      "

## 2024-03-06 NOTE — DISCHARGE INSTRUCTIONS
Incision care    If you have strips of tape on the cut (incision) the doctor made, leave the tape on for a week or until it falls off.     Wash the area daily with warm, soapy water, and pat it dry. Don't use hydrogen peroxide or alcohol, which can slow healing. You may cover the area with a gauze bandage if it weeps or rubs against clothing. Change the bandage every day.     Keep the area clean and dry.       [facility]  Same-Day Surgery   Adult Discharge Orders & Instructions   For 24 hours after surgery  Get plenty of rest.  A responsible adult must stay with you for at least 24 hours after you leave the hospital.   Do not drive or use heavy equipment.  If you have weakness or tingling, don't drive or use heavy equipment until this feeling goes away.  Do not drink alcohol.  Avoid strenuous or risky activities.  Ask for help when climbing stairs.   You may feel lightheaded.  IF so, sit for a few minutes before standing.  Have someone help you get up.   If you have nausea (feel sick to your stomach): Drink only clear liquids such as apple juice, ginger ale, broth or 7-Up.  Rest may also help.  Be sure to drink enough fluids.  Move to a regular diet as you feel able.  You may have a slight fever. Call the doctor if your fever is over 100 F (37.7 C) (taken under the tongue) or lasts longer than 24 hours.  You may have a dry mouth, a sore throat, muscle aches or trouble sleeping.  These should go away after 24 hours.  Do not make important or legal decisions.   Call your doctor for any of the followin.  Signs of infection (fever, growing tenderness at the surgery site, a large amount of drainage or bleeding, severe pain, foul-smelling drainage, redness, swelling).  2. It has been over 8 to 10 hours since surgery and you are still not able to urinate (pass water).  3.  Headache for over 24 hours.  4.  Numbness, tingling or weakness the day after surgery (if you had spinal anesthesia).

## 2024-03-06 NOTE — ANESTHESIA POSTPROCEDURE EVALUATION
Patient: Carlitos Hernandez    Procedure: Procedure(s):  EXCISION, SPERMATOCELE       Anesthesia Type:  General    Note:  Disposition: Outpatient   Postop Pain Control: Uneventful            Sign Out: Well controlled pain   PONV: No   Neuro/Psych: Uneventful            Sign Out: Acceptable/Baseline neuro status   Airway/Respiratory: Uneventful            Sign Out: Acceptable/Baseline resp. status   CV/Hemodynamics: Uneventful            Sign Out: Acceptable CV status; No obvious hypovolemia; No obvious fluid overload   Other NRE: NONE   DID A NON-ROUTINE EVENT OCCUR? No       Last vitals:  Vitals Value Taken Time   /62 03/06/24 1430   Temp 97.6  F (36.4  C) 03/06/24 1408   Pulse 60 03/06/24 1430   Resp 16 03/06/24 1430   SpO2 93 % 03/06/24 1430       Electronically Signed By: Nasim Dugan MD  March 6, 2024  3:00 PM

## 2024-03-06 NOTE — ANESTHESIA CARE TRANSFER NOTE
Patient: Carlitos Hernandez    Procedure: Procedure(s):  EXCISION, SPERMATOCELE       Diagnosis: Spermatocele [N43.40]  Diagnosis Additional Information: No value filed.    Anesthesia Type:   General     Note:    Oropharynx: oropharynx clear of all foreign objects and spontaneously breathing  Level of Consciousness: drowsy  Oxygen Supplementation: face mask  Level of Supplemental Oxygen (L/min / FiO2): 3  Independent Airway: airway patency satisfactory and stable  Dentition: dentition unchanged  Vital Signs Stable: post-procedure vital signs reviewed and stable  Report to RN Given: handoff report given  Patient transferred to: PACU    Handoff Report: Identifed the Patient, Identified the Reponsible Provider, Reviewed the pertinent medical history, Discussed the surgical course, Reviewed Intra-OP anesthesia mangement and issues during anesthesia, Set expectations for post-procedure period and Allowed opportunity for questions and acknowledgement of understanding  Vitals:  Vitals Value Taken Time   BP     Temp     Pulse     Resp     SpO2         Electronically Signed By: CHERYL Aragon CRNA  March 6, 2024  2:11 PM

## 2024-03-07 ENCOUNTER — CARE COORDINATION (OUTPATIENT)
Dept: UROLOGY | Facility: CLINIC | Age: 69
End: 2024-03-07
Payer: COMMERCIAL

## 2024-03-08 ENCOUNTER — PRE VISIT (OUTPATIENT)
Dept: UROLOGY | Facility: CLINIC | Age: 69
End: 2024-03-08
Payer: COMMERCIAL

## 2024-03-08 NOTE — CONFIDENTIAL NOTE
Reason for visit: post-op - spermatocele excision    Relevant information: spermatocele    Records/imaging/labs/orders: in epic    At Rooming: nelly Kam  3/8/2024  11:01 AM

## 2024-03-19 ENCOUNTER — DOCUMENTATION ONLY (OUTPATIENT)
Dept: OTHER | Facility: CLINIC | Age: 69
End: 2024-03-19
Payer: COMMERCIAL

## 2024-03-21 ENCOUNTER — OFFICE VISIT (OUTPATIENT)
Dept: UROLOGY | Facility: CLINIC | Age: 69
End: 2024-03-21
Payer: COMMERCIAL

## 2024-03-21 VITALS
HEIGHT: 71 IN | HEART RATE: 77 BPM | DIASTOLIC BLOOD PRESSURE: 85 MMHG | SYSTOLIC BLOOD PRESSURE: 126 MMHG | BODY MASS INDEX: 28 KG/M2 | WEIGHT: 200 LBS

## 2024-03-21 DIAGNOSIS — E29.1 HYPOGONADISM MALE: Primary | ICD-10-CM

## 2024-03-21 PROCEDURE — 99213 OFFICE O/P EST LOW 20 MIN: CPT | Mod: 24 | Performed by: UROLOGY

## 2024-03-21 ASSESSMENT — PAIN SCALES - GENERAL: PAINLEVEL: NO PAIN (0)

## 2024-03-21 NOTE — LETTER
"3/21/2024       RE: Carlitos Hernandez  [address] [address]     Dear Colleague,    Thank you for referring your patient, Carlitos Hernandez, to the [facility] [place] at [facility]. Please see a copy of my visit note below.    CC: Carlitos Hernandez is post-op from right spermatocele repair  done 3/6/24.    HPI: Patient is 2 wks post-op.  he has been doing well. No fevers or chills. Pain decreasing.   scant clear drainage from the incision.    Exam:   /85   Pulse 77   Ht 1.803 m (5' 11\")   Wt 90.7 kg (200 lb)   BMI 27.89 kg/m    Alert, NAD.   Respirations normal, non-labored.  Incision healing well. No discharge, erythema or fluctuence suggestive of infection.   Mild induration of right scrotal contents, consistent with the surgery he had.  No ecchymosis, no hydrocele or hematocele.    PLAN:   F/U PRN regarding spermatocele   Future lab draw order placed - see below.      Pt brings up an unrelated topic of hypogonadism today also.  He had PCP draw testosterone level last fall that was low.  Total testosterone was low, and free was below the clinically-expected range of 6.2 or above.  He has pervasive feelings of low energy/libido that correspond to this testosterone level.    Component      Latest Ref Rng 11/29/2023  8:07 AM   Free Testosterone Calculated      ng/dL 5.34    Testosterone Total      240 - 950 ng/dL 206 (L)    Sex Hormone Binding Globulin      11 - 80 nmol/L 18       I advised early AM recheck of testosterone, with LH, PRL, ferritin, to help determine primary versus secondary hypogonadism.  I will send pt results via [employer].  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 Franco or to endocrine service if needed.    Dedrick PEPPER    Visit within post-op global.           Additional Coding Information regarding low testosterone:    Problems:  3 -- one acute uncomplicated illness or " injury    Data Reviewed  3 labs reviewed     Tests ordered/pendin labs ordered     Level of risk:  3 -- low risk (e.g., OTC medication or observation, minor surgery without risks)    Time spent:  5 minutes spent on the date of the encounter doing chart review, history and exam, documentation related to the testosterone questions, separate from the post-op visit.

## 2024-03-21 NOTE — NURSING NOTE
"Carlitos Hernandez is a 68 year old male patient that presents today in clinic for the following:    Chief Complaint   Patient presents with    Follow Up     Post-op       The patient's allergies and medications were reviewed as noted. A set of vitals were recorded as noted without incident. The patient does not have any other questions for the provider.    Blood pressure 126/85, pulse 77, height 1.803 m (5' 11\"), weight 90.7 kg (200 lb). Body mass index is 27.89 kg/m .    Patient Active Problem List   Diagnosis    Hyperlipemia    Hyperlipidemia, unspecified hyperlipidemia type    It band syndrome, right    Plantar fasciitis    Spermatocele       Allergies   Allergen Reactions    Wasps [Hornets] Other (See Comments) and Hives     Decreased heart rate.     Cats Blisters    Pollen Extract        Current Outpatient Medications   Medication Sig Dispense Refill    creatine 400 MG capsule       EPINEPHrine (ANY BX GENERIC EQUIV) 0.3 MG/0.3ML injection 2-pack Inject 0.3 mLs (0.3 mg) into the muscle once as needed for anaphylaxis 2 each 1    fluocinonide (LIDEX) 0.05 % external cream Apply topically 3 times daily 60 g 11    glucosamine-chondroitin 500-400 MG CAPS per capsule Take 3 capsules by mouth daily 200 capsule 0    oxyCODONE (ROXICODONE) 5 MG tablet Take 1-2 tablets (5-10 mg) by mouth every 6 hours as needed for moderate to severe pain 12 tablet 0    rosuvastatin (CRESTOR) 20 MG tablet Take 1 tablet (20 mg) by mouth daily 90 tablet 3    sildenafil (VIAGRA) 100 MG tablet Take 1 tablet (100 mg) by mouth daily as needed 30 tablet 4    sildenafil (VIAGRA) 100 MG tablet Take 1 tablet (100 mg) by mouth daily as needed (30min to 4 hrs before sex.) .  Do not use with nitroglycerin, terazosin or doxazosin. 18 tablet 3       Social History     Tobacco Use    Smoking status: Never    Smokeless tobacco: Never    Tobacco comments:     none   Vaping Use    Vaping Use: Never used   Substance Use Topics    Alcohol use: Not Currently     " Alcohol/week: 7.0 standard drinks of alcohol     Types: 7 Standard drinks or equivalent per week    Drug use: No       Geraldinedaija Kam  3/21/2024  11:10 AM

## 2024-03-21 NOTE — PATIENT INSTRUCTIONS
Schedule for blood work sometime in the future in the morning.    It was a pleasure meeting with you today.  Thank you for allowing me and my team the privilege of caring for you today.  YOU are the reason we are here, and I truly hope we provided you with the excellent service you deserve.  Please let us know if there is anything else we can do for you so that we can be sure you are leaving completely satisfied with your care experience.

## 2024-03-21 NOTE — PROGRESS NOTES
"CC: Carlitos Hernandez is post-op from right spermatocele repair  done 3/6/24.    HPI: Patient is 2 wks post-op.  he has been doing well. No fevers or chills. Pain decreasing.   scant clear drainage from the incision.    Exam:   /85   Pulse 77   Ht 1.803 m (5' 11\")   Wt 90.7 kg (200 lb)   BMI 27.89 kg/m    Alert, NAD.   Respirations normal, non-labored.  Incision healing well. No discharge, erythema or fluctuence suggestive of infection.   Mild induration of right scrotal contents, consistent with the surgery he had.  No ecchymosis, no hydrocele or hematocele.    PLAN:   F/U PRN regarding spermatocele   Future lab draw order placed - see below.      Pt brings up an unrelated topic of hypogonadism today also.  He had PCP draw testosterone level last  that was low.  Total testosterone was low, and free was below the clinically-expected range of 6.2 or above.  He has pervasive feelings of low energy/libido that correspond to this testosterone level.    Component      Latest Ref Rng 2023  8:07 AM   Free Testosterone Calculated      ng/dL 5.34    Testosterone Total      240 - 950 ng/dL 206 (L)    Sex Hormone Binding Globulin      11 - 80 nmol/L 18       I advised early AM recheck of testosterone, with LH, PRL, ferritin, to help determine primary versus secondary hypogonadism.  I will send pt results via [employer].  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 Franco or to endocrine service if needed.    Dedrick PEPPER    Visit within post-op global.           Additional Coding Information regarding low testosterone:    Problems:  3 -- one acute uncomplicated illness or injury    Data Reviewed  3 labs reviewed     Tests ordered/pendin labs ordered     Level of risk:  3 -- low risk (e.g., OTC medication or observation, minor surgery without risks)    Time spent:  5 minutes spent on the date of the encounter doing chart review, history and exam, documentation related to " the testosterone questions, separate from the post-op visit.

## 2024-03-22 ENCOUNTER — LAB (OUTPATIENT)
Dept: LAB | Facility: CLINIC | Age: 69
End: 2024-03-22
Payer: COMMERCIAL

## 2024-03-22 DIAGNOSIS — E29.1 HYPOGONADISM MALE: ICD-10-CM

## 2024-03-22 LAB
FERRITIN SERPL-MCNC: 76 NG/ML (ref 31–409)
LH SERPL-ACNC: 5.9 MIU/ML (ref 1.7–8.6)
PROLACTIN SERPL 3RD IS-MCNC: 11 NG/ML (ref 4–15)
SHBG SERPL-SCNC: 16 NMOL/L (ref 11–80)

## 2024-03-22 PROCEDURE — 84270 ASSAY OF SEX HORMONE GLOBUL: CPT

## 2024-03-22 PROCEDURE — 83002 ASSAY OF GONADOTROPIN (LH): CPT

## 2024-03-22 PROCEDURE — 82728 ASSAY OF FERRITIN: CPT

## 2024-03-22 PROCEDURE — 84146 ASSAY OF PROLACTIN: CPT

## 2024-03-22 PROCEDURE — 36415 COLL VENOUS BLD VENIPUNCTURE: CPT

## 2024-03-22 PROCEDURE — 84403 ASSAY OF TOTAL TESTOSTERONE: CPT

## 2024-03-26 LAB
TESTOST FREE SERPL-MCNC: 5.08 NG/DL
TESTOST SERPL-MCNC: 188 NG/DL (ref 240–950)

## 2024-03-27 ENCOUNTER — MYC MEDICAL ADVICE (OUTPATIENT)
Dept: UROLOGY | Facility: CLINIC | Age: 69
End: 2024-03-27
Payer: COMMERCIAL

## 2024-03-27 DIAGNOSIS — R79.89 LOW TESTOSTERONE: Primary | ICD-10-CM

## 2024-03-28 NOTE — RESULT ENCOUNTER NOTE
Dear Carlitos,     Here are your recent results.     Testosterone recheck is indeed low.  Lutropin, prolactin and ferritin are proteins/hormones that help regulate testosterone level, these are all normal.     I think you would probably feel more energy if you took testosterone replacement therapy.  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  I recommend you talk with Dr. Franco about a testosterone prescription, otherwise I am happy to make a referral to endocrinology service.      Please let us know if you have any questions or concerns.    Thank You,  Dedrick PEPPER

## 2024-03-28 NOTE — TELEPHONE ENCOUNTER
[facility] Call Center    Phone Message    May a detailed message be left on voicemail: no     Reason for Call: Other: pt calling looking for testrone therapy,    Has low testrone and wants help with it  Action Taken: Other: urology    Travel Screening: Not Applicable

## 2024-03-29 NOTE — TELEPHONE ENCOUNTER
Spoke to patient, reviewed providers clinic notes with patient, he is planning to call endocrinology for evaluation. Scheduling number provided to patient, no further follow up needed    Thank you,  SURI PackerN RN-Triage-Urology

## 2024-04-24 ENCOUNTER — OFFICE VISIT (OUTPATIENT)
Dept: INTERNAL MEDICINE | Facility: CLINIC | Age: 69
End: 2024-04-24
Payer: COMMERCIAL

## 2024-04-24 VITALS
DIASTOLIC BLOOD PRESSURE: 93 MMHG | HEART RATE: 78 BPM | HEIGHT: 71 IN | WEIGHT: 210.7 LBS | SYSTOLIC BLOOD PRESSURE: 159 MMHG | BODY MASS INDEX: 29.5 KG/M2 | OXYGEN SATURATION: 97 %

## 2024-04-24 DIAGNOSIS — E34.9 HYPOTESTOSTERONEMIA: ICD-10-CM

## 2024-04-24 DIAGNOSIS — Z12.5 SCREENING FOR PROSTATE CANCER: ICD-10-CM

## 2024-04-24 DIAGNOSIS — R68.82 DECREASED LIBIDO: Primary | ICD-10-CM

## 2024-04-24 PROCEDURE — 99213 OFFICE O/P EST LOW 20 MIN: CPT | Mod: 24 | Performed by: INTERNAL MEDICINE

## 2024-04-24 RX ORDER — TESTOSTERONE 1.62 MG/G
2 GEL TRANSDERMAL DAILY
Qty: 2 G | Refills: 1 | Status: SHIPPED | OUTPATIENT
Start: 2024-04-24 | End: 2024-06-07

## 2024-04-24 NOTE — PROGRESS NOTES
"HPI  68-year-old send status discussed testosterone replacement therapy.  He has had a couple of testosterone levels now showing progressively low total testosterone some drop in his free testosterone in the low normal range.  He feels he is symptomatic in this regard largely related to loss of libido for recovery from exercise lack of pep and energy contributing to this.  He has not had any associated medication changes recently.  His pituitary hormone levels have been normal.  His research testosterone replacement and is interested in a trial.  Past Medical History:   Diagnosis Date    Hyperlipemia     It band syndrome, right 11/23/2016    Labile hypertension     Plantar fasciitis 02/19/2018     Past Surgical History:   Procedure Laterality Date    COLONOSCOPY  2014    CRANIOTOMY, REPAIR ANEURYSM, COMBINED Right 08/09/2018    Procedure:  RIGHT CRANIOTOMY AND CLIPPING OF RIGHT CAROTID TERMINUS ANEURYSM, COMPLEX INTRAOPERATIVE ANGIOGRAMS;  Surgeon: Tigist Davidson MD;  Location: [facility];  Service:     IR CEREBRAL ANGIOGRAM  08/09/2018    rt craaniotomy for anerysm clipping  08/2018    SPERMATOCELECTOMY Right 3/6/2024    Procedure: EXCISION, SPERMATOCELE;  Surgeon: Ham Shay MD;  Location:  OR    thyroglossal duct cystectomy      THYROID SURGERY      TONSILLECTOMY      VASECTOMY       Family History   Problem Relation Age of Onset    Coronary Artery Disease Maternal Grandmother     Other Cancer Father         liver    Hypertension Mother     Cerebrovascular Disease Mother     Asthma Sister     Diabetes No family hx of     Glaucoma No family hx of     Macular Degeneration No family hx of          Exam:  BP (!) 159/93 (BP Location: Right arm, Patient Position: Sitting, Cuff Size: Adult Large)   Pulse 78   Ht 1.803 m (5' 10.98\")   Wt 95.6 kg (210 lb 11.2 oz)   SpO2 97%   BMI 29.40 kg/m    210 lbs 11.2 oz  The patient is alert, oriented with a clear sensorium.   Skin shows no lesions or " rashes and good turgor.   Head is normocephalic and atraumatic.          ASSESSMENT  1 hypotestosteronism questionably symptomatic  2 hypertension borderline control non-pharacologically  3 hyperlipidemia on rosuvastatin  4 IGT  5 small bilateral inguinal hernias    Plan  We discussed risk benefits side effects of testosterone replacement therapy.  He would like to give it a trial so we will use AndroGel for 3 months and follow-up at that time get a baseline PSA today plan to reassess his BMP CBC and testosterone levels in 3 months on this replacement.    This note was completed using Dragon voice recognition software.      Ovidio Franco MD  General Internal Medicine  [facility]  [phone]

## 2024-04-26 ENCOUNTER — LAB (OUTPATIENT)
Dept: LAB | Facility: CLINIC | Age: 69
End: 2024-04-26
Payer: COMMERCIAL

## 2024-04-26 DIAGNOSIS — Z12.5 SCREENING FOR PROSTATE CANCER: ICD-10-CM

## 2024-04-26 PROCEDURE — G0103 PSA SCREENING: HCPCS

## 2024-04-26 PROCEDURE — 36415 COLL VENOUS BLD VENIPUNCTURE: CPT

## 2024-04-27 LAB — PSA SERPL DL<=0.01 NG/ML-MCNC: 2.37 NG/ML (ref 0–6.5)

## 2024-05-07 ENCOUNTER — PRE VISIT (OUTPATIENT)
Dept: OTOLARYNGOLOGY | Facility: CLINIC | Age: 69
End: 2024-05-07

## 2024-06-07 ENCOUNTER — MYC REFILL (OUTPATIENT)
Dept: INTERNAL MEDICINE | Facility: CLINIC | Age: 69
End: 2024-06-07
Payer: COMMERCIAL

## 2024-06-07 DIAGNOSIS — R68.82 DECREASED LIBIDO: ICD-10-CM

## 2024-06-07 DIAGNOSIS — E34.9 HYPOTESTOSTERONEMIA: ICD-10-CM

## 2024-06-07 NOTE — TELEPHONE ENCOUNTER
testosterone (ANDROGEL 1.62 % PUMP) 20.25 MG/ACT gel     2 g 1 4/24/2024     Last Office Visit : 4-  Future Office visit:  none        Androgen Agents [id number]/07/2024 11:29 AM   Protocol Details Refills for this classification require provider review    Blood pressure under 140/90 in past 6 months      4/24/2024  9:17 AM   Vital Signs    Systolic 159 !    Diastolic 93 (H)    Pulse 78

## 2024-06-10 RX ORDER — TESTOSTERONE 1.62 MG/G
2 GEL TRANSDERMAL DAILY
Qty: 3 G | Refills: 1 | Status: SHIPPED | OUTPATIENT
Start: 2024-06-10

## 2024-07-02 DIAGNOSIS — E78.5 HYPERLIPIDEMIA, UNSPECIFIED HYPERLIPIDEMIA TYPE: ICD-10-CM

## 2024-07-02 RX ORDER — ROSUVASTATIN CALCIUM 20 MG/1
20 TABLET, COATED ORAL DAILY
Qty: 90 TABLET | Refills: 3 | OUTPATIENT
Start: 2024-07-02

## 2024-07-12 ENCOUNTER — OFFICE VISIT (OUTPATIENT)
Dept: OPHTHALMOLOGY | Facility: CLINIC | Age: 69
End: 2024-07-12
Attending: OPTOMETRIST
Payer: COMMERCIAL

## 2024-07-12 DIAGNOSIS — H25.13 AGE-RELATED NUCLEAR CATARACT OF BOTH EYES: ICD-10-CM

## 2024-07-12 DIAGNOSIS — H35.372 EPIRETINAL MEMBRANE, LEFT EYE: Primary | ICD-10-CM

## 2024-07-12 DIAGNOSIS — H43.813 PVD (POSTERIOR VITREOUS DETACHMENT), BILATERAL: ICD-10-CM

## 2024-07-12 PROCEDURE — 92012 INTRM OPH EXAM EST PATIENT: CPT | Performed by: OPTOMETRIST

## 2024-07-12 PROCEDURE — G0463 HOSPITAL OUTPT CLINIC VISIT: HCPCS | Performed by: OPTOMETRIST

## 2024-07-12 ASSESSMENT — REFRACTION_WEARINGRX
OS_AXIS: 110
OS_SPHERE: -2.00
OD_SPHERE: -2.00
OS_ADD: +2.50
OS_CYLINDER: +0.25
OD_CYLINDER: +0.75
OD_AXIS: 050
OD_ADD: +2.50

## 2024-07-12 ASSESSMENT — TONOMETRY
OD_IOP_MMHG: 21
OS_IOP_MMHG: 21
IOP_METHOD: ICARE

## 2024-07-12 ASSESSMENT — VISUAL ACUITY
METHOD: SNELLEN - LINEAR
OD_CC: 20/30
CORRECTION_TYPE: GLASSES
OS_CC: 20/20

## 2024-07-12 ASSESSMENT — SLIT LAMP EXAM - LIDS
COMMENTS: NORMAL
COMMENTS: NORMAL

## 2024-07-12 ASSESSMENT — EXTERNAL EXAM - LEFT EYE: OS_EXAM: NORMAL

## 2024-07-12 ASSESSMENT — EXTERNAL EXAM - RIGHT EYE: OD_EXAM: NORMAL

## 2024-07-12 NOTE — NURSING NOTE
Chief Complaints and History of Present Illnesses   Patient presents with    Epiretinal Membrane Follow Up     6 month follow up.   1. Epiretinal membrane, left eye - Both Eyes   2. PVD (posterior vitreous detachment), bilateral - Both Eyes   3. Age-related nuclear cataract of both eyes - Both Eyes      Patient is unsure if his vision has changed over the last 6 months. No eye drops used.     ALYSIA Palomo 7:52 AM 07/12/2024       Chief Complaint(s) and History of Present Illness(es)       Epiretinal Membrane Follow Up              Laterality: both eyes    Onset: months ago    Course: stable    Associated symptoms: floaters (not as obvious).  Negative for glare, haloes, double vision, dryness, eye pain and flashes    Treatments tried: glasses    Pain scale: 0/10    Comments: 6 month follow up.   1. Epiretinal membrane, left eye - Both Eyes   2. PVD (posterior vitreous detachment), bilateral - Both Eyes   3. Age-related nuclear cataract of both eyes - Both Eyes      Patient is unsure if his vision has changed over the last 6 months. No eye drops used.     Sarita Karimi, COT 7:52 AM 07/12/2024

## 2024-07-12 NOTE — PROGRESS NOTES
Assessment & Plan       Carlitos Hernandez is a 68 year old male with the following diagnoses:   1. Epiretinal membrane, left eye - Left Eye    2. PVD (posterior vitreous detachment), bilateral - Both Eyes    3. Age-related nuclear cataract of both eyes - Both Eyes          ERM no change   PVD Discussed vitreoul floaters and PVD   Educated patient on signs and symptoms of retinal detachment including increase in flashes, floaters, or a change in vision. If symptoms present, return to clinic immediately.   Cataracts consult needed for surgery in January 2025    Patient disposition:   No follow-ups on file.          Complete documentation of historical and exam elements from today's encounter can be found in the full encounter summary report (not reduplicated in this progress note). I personally obtained the chief complaint(s) and history of present illness.  I confirmed and edited as necessary the review of systems, past medical/surgical history, family history, social history, and examination findings as documented by others; and I examined the patient myself. I personally reviewed the relevant tests, images, and reports as documented above. I formulated and edited as necessary the assessment and plan and discussed the findings and management plan with the patient and family.  Dr. Antoni Love

## 2024-07-12 NOTE — PATIENT INSTRUCTIONS
ERM no change   PVD Discussed vitreoul floaters and PVD   Educated patient on signs and symptoms of retinal detachment including increase in flashes, floaters, or a change in vision. If symptoms present, return to clinic immediately.   Cataracts consult needed for surgery in January 2025

## 2024-09-11 ENCOUNTER — LAB (OUTPATIENT)
Dept: LAB | Facility: CLINIC | Age: 69
End: 2024-09-11
Payer: COMMERCIAL

## 2024-09-11 DIAGNOSIS — R68.82 DECREASED LIBIDO: ICD-10-CM

## 2024-09-11 DIAGNOSIS — E34.9 HYPOTESTOSTERONEMIA: ICD-10-CM

## 2024-09-11 LAB
BASOPHILS # BLD AUTO: 0 10E3/UL (ref 0–0.2)
BASOPHILS NFR BLD AUTO: 1 %
EOSINOPHIL # BLD AUTO: 0.2 10E3/UL (ref 0–0.7)
EOSINOPHIL NFR BLD AUTO: 3 %
ERYTHROCYTE [DISTWIDTH] IN BLOOD BY AUTOMATED COUNT: 12.1 % (ref 10–15)
HCT VFR BLD AUTO: 44 % (ref 40–53)
HGB BLD-MCNC: 15 G/DL (ref 13.3–17.7)
IMM GRANULOCYTES # BLD: 0 10E3/UL
IMM GRANULOCYTES NFR BLD: 0 %
LYMPHOCYTES # BLD AUTO: 2.2 10E3/UL (ref 0.8–5.3)
LYMPHOCYTES NFR BLD AUTO: 40 %
MCH RBC QN AUTO: 30.6 PG (ref 26.5–33)
MCHC RBC AUTO-ENTMCNC: 34.1 G/DL (ref 31.5–36.5)
MCV RBC AUTO: 90 FL (ref 78–100)
MONOCYTES # BLD AUTO: 0.4 10E3/UL (ref 0–1.3)
MONOCYTES NFR BLD AUTO: 7 %
NEUTROPHILS # BLD AUTO: 2.7 10E3/UL (ref 1.6–8.3)
NEUTROPHILS NFR BLD AUTO: 49 %
PLATELET # BLD AUTO: 196 10E3/UL (ref 150–450)
RBC # BLD AUTO: 4.9 10E6/UL (ref 4.4–5.9)
WBC # BLD AUTO: 5.5 10E3/UL (ref 4–11)

## 2024-09-11 PROCEDURE — 36415 COLL VENOUS BLD VENIPUNCTURE: CPT

## 2024-09-11 PROCEDURE — 84270 ASSAY OF SEX HORMONE GLOBUL: CPT

## 2024-09-11 PROCEDURE — 85025 COMPLETE CBC W/AUTO DIFF WBC: CPT

## 2024-09-11 PROCEDURE — 80048 BASIC METABOLIC PNL TOTAL CA: CPT

## 2024-09-11 PROCEDURE — 84403 ASSAY OF TOTAL TESTOSTERONE: CPT

## 2024-09-12 DIAGNOSIS — E34.9 HYPOTESTOSTERONEMIA: Primary | ICD-10-CM

## 2024-09-12 LAB
ANION GAP SERPL CALCULATED.3IONS-SCNC: 12 MMOL/L (ref 7–15)
BUN SERPL-MCNC: 18.1 MG/DL (ref 8–23)
CALCIUM SERPL-MCNC: 9.6 MG/DL (ref 8.8–10.4)
CHLORIDE SERPL-SCNC: 103 MMOL/L (ref 98–107)
CREAT SERPL-MCNC: 1.09 MG/DL (ref 0.67–1.17)
EGFRCR SERPLBLD CKD-EPI 2021: 73 ML/MIN/1.73M2
GLUCOSE SERPL-MCNC: 97 MG/DL (ref 70–99)
HCO3 SERPL-SCNC: 25 MMOL/L (ref 22–29)
POTASSIUM SERPL-SCNC: 4.7 MMOL/L (ref 3.4–5.3)
SHBG SERPL-SCNC: 17 NMOL/L (ref 11–80)
SODIUM SERPL-SCNC: 140 MMOL/L (ref 135–145)

## 2024-09-13 LAB
TESTOST FREE SERPL-MCNC: 8.87 NG/DL
TESTOST SERPL-MCNC: 323 NG/DL (ref 240–950)

## 2024-10-01 DIAGNOSIS — E78.5 HYPERLIPIDEMIA, UNSPECIFIED HYPERLIPIDEMIA TYPE: ICD-10-CM

## 2024-10-01 RX ORDER — ROSUVASTATIN CALCIUM 20 MG/1
20 TABLET, COATED ORAL DAILY
Qty: 90 TABLET | Refills: 1 | Status: SHIPPED | OUTPATIENT
Start: 2024-10-01

## 2024-10-02 NOTE — TELEPHONE ENCOUNTER
LVD:  4/24/2024  [facility], Ovidio Peralta MD  Internal Medicine     LDL Cholesterol Calculated   Date Value Ref Range Status   11/29/2023 106 (H) <=100 mg/dL Final   04/23/2019 113 (H) <100 mg/dL Final     Comment:     Above desirable:  100-129 mg/dl  Borderline High:  130-159 mg/dL  High:             160-189 mg/dL  Very high:       >189 mg/dl         Refilled per protocol.

## 2024-11-13 ENCOUNTER — OFFICE VISIT (OUTPATIENT)
Dept: AUDIOLOGY | Facility: CLINIC | Age: 69
End: 2024-11-13
Attending: INTERNAL MEDICINE
Payer: COMMERCIAL

## 2024-11-13 DIAGNOSIS — H90.3 SENSORINEURAL HEARING LOSS, ASYMMETRICAL: ICD-10-CM

## 2024-11-13 PROCEDURE — 92557 COMPREHENSIVE HEARING TEST: CPT | Performed by: AUDIOLOGIST

## 2024-11-13 PROCEDURE — 92550 TYMPANOMETRY & REFLEX THRESH: CPT | Performed by: AUDIOLOGIST

## 2024-11-13 NOTE — PROGRESS NOTES
AUDIOLOGY REPORT    SUBJECTIVE:  Carlitos Hernandez is a 69 year old male who was seen in the Audiology Clinic at the [facility] for audiologic evaluation, referred by Ovidio Franco M.D. .The patient has been seen previously in this clinic on 4/24/2019 for assessment and results indicated asymmetrical sensorineural hearing loss The patient reports an inner ear infection in 2018, left ear tinnitus, family history of age-related hearing loss and declining hearing. The patient denies  bilateral otalgia, bilateral drainage, bilateral aural fullness, and history of noise exposure.  The patient notes difficulty with communication in a variety of listening situations.     OBJECTIVE:  Abuse Screening:  Do you feel unsafe at home or work/school? No  Do you feel threatened by someone? No  Does anyone try to keep you from having contact with others, or doing things outside of your home? No  Physical signs of abuse present? No     Fall Risk Screen:  1. Have you fallen two or more times in the past year? No  2. Have you fallen and had an injury in the past year? No    Otoscopic exam indicates ears are clear of cerumen bilaterally     Pure Tone Thresholds assessed using conventional audiometry with good  reliability from 250-8000 Hz bilaterally using insert earphones and circumaural headphones     RIGHT:  mild from 250-2000 Hz sloping to moderate sensorineural hearing loss    LEFT:    mild from 250-1000 Hz sloping to moderate  and moderate-severe sensorineural hearing loss    Tympanogram:    RIGHT: normal eardrum mobility    LEFT:   normal eardrum mobility    Reflexes (reported by stimulus ear):  RIGHT: Ipsilateral is absent at frequencies tested  RIGHT: Contralateral is present at normal levels  LEFT:   Ipsilateral is present at normal levels  LEFT:   Contralateral is absent at frequencies tested      Speech Reception Threshold:    RIGHT: 30 dB HL    LEFT:   40 dB HL    Speech Reception Thresholds are  in good agreement with pure tone thresholds.    Word Recognition Score:     RIGHT: 96% at 70 dB HL using NU-6 recorded word list.    LEFT:   72% at 80 dB HL using NU-6 recorded word list.      ASSESSMENT:     ICD-10-CM    1. Sensorineural hearing loss, asymmetrical  H90.3 Adult Audiology  Referral          Compared to patient's previous audiogram dated 4/24/2019, hearing has declined bilaterally and word recognition in the left ear has declined from 100% in 2019 to 72% today. Today s results were discussed with the patient in detail.     PLAN:  Patient was counseled regarding hearing loss and impact on communication.  Patient is a good candidate for amplification at this time.  Due to the significant decline in hearing and widening asymmetry, it is recommended that the patient see ENT to rule out retrocochlear pathology.  Please call this clinic with questions regarding these results or recommendations.        Fabio Torres MA, CCC-A  [place] Licensed Audiologist #[id number]  11/13/2024

## 2024-12-02 NOTE — PROGRESS NOTES
CHIEF COMPLAINT:   Chief Complaint   Patient presents with    Right Knee - Pain     Carlitos was referred here for right knee pain.  He is an avid runner and 5 years ago had a slight issue, eventually improved.  About 5 weeks ago knee started to bother him again, no specific fall or injury.  Has xrays/MRI at [facility] 2-3 weeks ago.  Looking for long term plan for meniscus.  No history on the Right knee - did have scope for Left knee.  No swelling/bruising present.  During morning stretching routine pain while kneeling directly on the knee is significant.           Carlitos Hernandez is seen today in the [facility] for evaluation of right knee pain at the request of Dr. Ovidio Franco       HISTORY OF PRESENT ILLNESS    Carlitos Hernandez is a 69 year old male seen for evaluation of ongoing right knee pain with no known injury.  He was an avid runner. Pain has been present for 5 years, off/on. He had a slight issue 5 years ago at the time, so stopped running, and eventually improved. He's not run since then. About 5 weeks ago started to bother him again, no specific injury. He locates pain inner aspect of the knee aggravated with twisting motions. Pain front/outer knee aggravated with directly kneeling on his knee, squatting.  No swelling, bruising, etc.  Denies locking, catching, giving way. Right now not much pain, able to walk normally without limping. Pain comes and goes.    Ok at rest.    He was seen at [facility] 2-3 weeks ago, had xrays and MRI.    History of left knee arthroscopy in the past.    Denies hip or low back pain. Occasional sciatic nerve pain right leg, will treat with inversion table.    Present symptoms: pain medially , laterally, and anteriorly, pain dull/achy , mild pain, no swelling, no catching/popping, no locking, no giving way.    Pain severity: 1/10  Frequency of symptoms: are constant  Exacerbating Factors: twisting  Relieving Factors: rest,  sitting  Night Pain: No  Pain while at rest: No   Numbness or tingling: No   Patient has tried:     NSAIDS: No      Acetaminophen: No     Opioids: No     Physical Therapy: No      Home Exercise Program / Resistance training: No      Activity modification: No      Bracing: No      Assistive device:  No     Injections: No       Ice: No      Heat: No      Topicals: No     Other PMH:  has a past medical history of Hyperlipemia, It band syndrome, right (11/23/2016), Labile hypertension, and Plantar fasciitis (02/19/2018).    He has no past medical history of Arthritis, Cancer (H), Cerebral infarction (H), Congestive heart failure (H), COPD (chronic obstructive pulmonary disease) (H), Depressive disorder, Diabetes (H), Heart disease, History of blood transfusion, Thyroid disease, or Uncomplicated asthma.  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    Medications:   Current Outpatient Medications:     creatine 400 MG capsule, 250 mg daily, Disp: , Rfl:     EPINEPHrine (ANY BX GENERIC EQUIV) 0.3 MG/0.3ML injection 2-pack, Inject 0.3 mLs (0.3 mg) into the muscle once as needed for anaphylaxis (Patient not taking: Reported on 4/24/2024), Disp: 2 each, Rfl: 1    fluocinonide (LIDEX) 0.05 % external cream, Apply topically 3 times daily, Disp: 60 g, Rfl: 11    glucosamine-chondroitin 500-400 MG CAPS per capsule, Take 3 capsules by mouth daily, Disp: 200 capsule, Rfl: 0    Omega-3 Fatty Acids (OMEGA-3 FISH OIL PO), , Disp: , Rfl:     oxyCODONE (ROXICODONE) 5 MG tablet,  Take 1-2 tablets (5-10 mg) by mouth every 6 hours as needed for moderate to severe pain (Patient not taking: Reported on 4/24/2024), Disp: 12 tablet, Rfl: 0    rosuvastatin (CRESTOR) 20 MG tablet, TAKE 1 TABLET(20 MG) BY MOUTH DAILY, Disp: 90 tablet, Rfl: 1    sildenafil (VIAGRA) 100 MG tablet, Take 1 tablet (100 mg) by mouth daily as needed (Patient not taking: Reported on 4/24/2024), Disp: 30 tablet, Rfl: 4    sildenafil (VIAGRA) 100 MG tablet, Take 1 tablet (100 mg) by mouth daily as needed (30min to 4 hrs before sex.) .  Do not use with nitroglycerin, terazosin or doxazosin., Disp: 18 tablet, Rfl: 3    testosterone (ANDROGEL 1.62 % PUMP) 20.25 MG/ACT gel, Place 2 Pump (40.5 mg) onto the skin daily, Disp: 3 g, Rfl: 1    Current Facility-Administered Medications:     triamcinolone acetonide (KENALOG-10) injection 2 mg, 2 mg, Intra-Lesional, Once, Arash Ariza MD    triamcinolone acetonide (KENALOG-10) injection 4 mg, 4 mg, Intra-Lesional, Once, Arash Ariza MD    Allergies:   Allergies   Allergen Reactions    Wasps [Hornets] Other (See Comments) and Hives     Decreased heart rate.     Cats Blisters    Pollen Extract        Social Hx: retired.   reports that he has never smoked. He has never used smokeless tobacco. He reports that he does not currently use alcohol after a past usage of about 7.0 standard drinks of alcohol per week. He reports that he does not use drugs.    Family Hx: family history includes Asthma in his sister; Cerebrovascular Disease in his mother; Coronary Artery Disease in his maternal grandmother; Hypertension in his mother; Other Cancer in his father.    REVIEW OF SYSTEMS: 10 point ROS neg other than the symptoms noted above in the HPI and PMH. Notables include  CONSTITUTIONAL:NEGATIVE for fever, chills, change in weight  INTEGUMENTARY/SKIN: NEGATIVE for worrisome rashes, moles or lesions  MUSCULOSKELETAL:See HPI above  NEURO: NEGATIVE for weakness, dizziness or  paresthesias    PHYSICAL EXAM:  Ht 1.829 m (6')   Wt 90.7 kg (200 lb)   BMI 27.12 kg/m     GENERAL APPEARANCE: healthy, alert, no distress  SKIN: no suspicious lesions or rashes  NEURO: Normal strength and tone, mentation intact and speech normal  PSYCH:  mentation appears normal and affect normal, not anxious  RESPIRATORY: No increased work of breathing.  HANDS: no clubbing, nail pitting  LYMPH: no palpable popliteal lymphadenopathy.    BILATERAL LOWER EXTREMITIES:  Gait: normal  Alignment: varus  No gross deformities or masses.  No Quad atrophy, strength normal.  Intact sensation deep peroneal nerve, superficial peroneal nerve, med/lat tibial nerve, sural nerve, saphenous nerve  Intact EHL, EDL, TA, FHL, GS, quadriceps hamstrings and hip flexors   Bilateral calf soft and nttp or squeeze.  DTRs: achilles 2+, patella 2+.  Edema: trace    LEFT KNEE EXAM:    Skin: intact, no ecchymosis or erythema  Squat: 100 %, not limited by pain.     ROM: full extension to full flexion  Tight hamstrings on straight leg raise.  Effusion: none  Tender: NTTP med/lat joint line, anterior or posterior knee  McMurrays: negative    MCL: stable, and non-painful at both 0 and 30 degrees knee flexion  Varus stress: stable, and non-painful at both 0 and 30 degrees knee flexion  Lachmans: neg, firm endpoint  Posterior Drawer stable  Patellofemoral joint:                Apprehension: negative              Crepitations: minimal    RIGHT KNEE EXAM:    Skin: intact, no ecchymosis or erythema  Squat: 100 %, not limited by pain.     ROM: full extension to full flexion  Tight hamstrings on straight leg raise.  Effusion: none  Tender: NTTP med/lat joint line, anterior or posterior knee  McMurrays: negative    MCL: stable, and non-painful at both 0 and 30 degrees knee flexion  Varus stress: stable, and non-painful at both 0 and 30 degrees knee flexion  Lachmans: neg, firm endpoint  Posterior Drawer stable  Patellofemoral joint:                 "Apprehension: negative              Crepitations: mild   Grind: mild positive.    X-RAY:  4 vies bilateral knees views 11/1/2024 ([facility] via PACS) were reviewed in clinic today. On my review, no obvious fractures or dislocations. Mild bilateral medial compartment narrowing, left more than right. Anvil osteophytes within the notch. Mild lateral patello-femoral narrowing.         MRI:  MRI right knee from 11/13/2024 , [facility], was reviewed in clinic today (via PACS).  There is moderate lateral patello-femoral chondromalacia with subchondral edema and cystic changes of the lateral patella facet with central fissure. Complex posterior horn medial meniscus tear. There is mild medial compartment chondromalacia.           ASSESSMENT/PLAN: Carlitos Hernandez is a 69 year old male with acute on chronic right knee pain, complex medial meniscus tear, primary osteoarthritis.     * reviewed imaging studies with patient, showing arthritic changes, or wearing of the cartilage in the knee. This can be caused by normal \"wear and tear\" over the years or following prior injury to the knee.  Treatment typically starts nonsurgically. Surgical indication for total knee arthroplasty  when nonsurgical management is no longer effective.  He does have a complex medial meniscus tear, which I suspect is chronic. Unclear how much pain is from medial meniscus tear versus underlying osteoarthritis.  Could consider arthroscopy, medial meniscus debridement, but results unpredictable in setting of osteoarthritis.  At this time, he's doing fine and will monitor.    Non-surgical treatment for knee arthritis includes:    * rest, sitting  * Activity modification - avoid impact activities or activities that aggravate symptoms.  * NSAIDS (non-steroidal anti-inflammatory medications; e.g. Aleve, advil, motrin, ibuprofen) - regular use for inflammation ( twice daily or three times daily), with food, as long as no contra-indications Please discuss with " "primary care doctor if needed  * ice, 15-20 minutes at a time several times a day or as needed.  * Strengthening of quadriceps muscles  * Physical Therapy for strengthening, stretching and range of motion exercises of legs  * Tylenol as needed for pain, consider Tylenol arthritis or similar  * Weight loss: Weight loss:  Body mass index is 27.12 kg/m .. weight loss benefits, not only for the current pain symptoms, but also overall health. Recommend a good diet plan that works for the patient, with the assistance of a dietician or primary care doctor as needed. Also, a good, low-impact exercise program for at least 20 minutes per day, 3 times per week, such as exercise bike, elliptical , or pool.  * Exercise: low impact such as stationary bike, elliptical, pool.  * Injections: cortisone versus viscosupplementation (hyaluronic acid, \"rooster comb\", \"gel shots\"); risks and perceived benefits discussed today. Patient elects NOT to proceed.  * Bracing: bracing the knee may offer some relief of symptoms when worn and provide some stability.  * over the counter supplements such as glucosamine and chondroitin sulfate may help with joint pain.  * topical ointments may help as well    * return to clinic as needed.         Pranav Reed M.D., M.S.  Dept. of Orthopaedic Surgery  [facility]       "

## 2024-12-04 ENCOUNTER — OFFICE VISIT (OUTPATIENT)
Dept: ORTHOPEDICS | Facility: CLINIC | Age: 69
End: 2024-12-04
Attending: INTERNAL MEDICINE
Payer: COMMERCIAL

## 2024-12-04 VITALS — HEIGHT: 72 IN | WEIGHT: 200 LBS | BODY MASS INDEX: 27.09 KG/M2

## 2024-12-04 DIAGNOSIS — S83.231A COMPLEX TEAR OF MEDIAL MENISCUS OF RIGHT KNEE AS CURRENT INJURY, INITIAL ENCOUNTER: ICD-10-CM

## 2024-12-04 DIAGNOSIS — M17.11 PRIMARY OSTEOARTHRITIS OF RIGHT KNEE: ICD-10-CM

## 2024-12-04 DIAGNOSIS — M25.561 ACUTE PAIN OF RIGHT KNEE: Primary | ICD-10-CM

## 2024-12-04 PROCEDURE — 99203 OFFICE O/P NEW LOW 30 MIN: CPT | Performed by: ORTHOPAEDIC SURGERY

## 2024-12-04 ASSESSMENT — PAIN SCALES - GENERAL: PAINLEVEL_OUTOF10: NO PAIN (0)

## 2024-12-04 NOTE — LETTER
12/4/2024      Carlitos Hernandez  [address] [facility] [address]      Dear Colleague,    Thank you for referring your patient, Carlitos Hernandez, to the [facility]. Please see a copy of my visit note below.    CHIEF COMPLAINT:   Chief Complaint   Patient presents with     Right Knee - Pain     Carlitos was referred here for right knee pain.  He is an avid runner and 5 years ago had a slight issue, eventually improved.  About 5 weeks ago knee started to bother him again, no specific fall or injury.  Has xrays/MRI at [facility] 2-3 weeks ago.  Looking for long term plan for meniscus.  No history on the Right knee - did have scope for Left knee.  No swelling/bruising present.  During morning stretching routine pain while kneeling directly on the knee is significant.           Carlitos Hernandez is seen today in the [facility] for evaluation of right knee pain at the request of Dr. Ovidio Franco       HISTORY OF PRESENT ILLNESS    Carlitos Hernandez is a 69 year old male seen for evaluation of ongoing right knee pain with no known injury.  He was an avid runner. Pain has been present for 5 years, off/on. He had a slight issue 5 years ago at the time, so stopped running, and eventually improved. He's not run since then. About 5 weeks ago started to bother him again, no specific injury. He locates pain inner aspect of the knee aggravated with twisting motions. Pain front/outer knee aggravated with directly kneeling on his knee, squatting.  No swelling, bruising, etc.  Denies locking, catching, giving way. Right now not much pain, able to walk normally without limping. Pain comes and goes.    Ok at rest.    He was seen at [facility] 2-3 weeks ago, had xrays and MRI.    History of left knee arthroscopy in the past.    Denies hip or low back pain. Occasional sciatic nerve pain right leg, will treat with inversion table.    Present symptoms: pain  medially , laterally, and anteriorly, pain dull/achy , mild pain, no swelling, no catching/popping, no locking, no giving way.    Pain severity: 1/10  Frequency of symptoms: are constant  Exacerbating Factors: twisting  Relieving Factors: rest, sitting  Night Pain: No  Pain while at rest: No   Numbness or tingling: No   Patient has tried:     NSAIDS: No      Acetaminophen: No     Opioids: No     Physical Therapy: No      Home Exercise Program / Resistance training: No      Activity modification: No      Bracing: No      Assistive device:  No     Injections: No       Ice: No      Heat: No      Topicals: No     Other PMH:  has a past medical history of Hyperlipemia, It band syndrome, right (11/23/2016), Labile hypertension, and Plantar fasciitis (02/19/2018).    He has no past medical history of Arthritis, Cancer (H), Cerebral infarction (H), Congestive heart failure (H), COPD (chronic obstructive pulmonary disease) (H), Depressive disorder, Diabetes (H), Heart disease, History of blood transfusion, Thyroid disease, or Uncomplicated asthma.  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    Medications:   Current Outpatient Medications:      creatine 400 MG capsule, 250 mg daily, Disp: , Rfl:      EPINEPHrine (ANY BX GENERIC EQUIV) 0.3 MG/0.3ML injection 2-pack, Inject 0.3 mLs (0.3 mg) into the muscle once as needed for anaphylaxis (Patient not taking: Reported on 4/24/2024), Disp: 2 each, Rfl: 1     fluocinonide (LIDEX) 0.05 % external cream, Apply topically  3 times daily, Disp: 60 g, Rfl: 11     glucosamine-chondroitin 500-400 MG CAPS per capsule, Take 3 capsules by mouth daily, Disp: 200 capsule, Rfl: 0     Omega-3 Fatty Acids (OMEGA-3 FISH OIL PO), , Disp: , Rfl:      oxyCODONE (ROXICODONE) 5 MG tablet, Take 1-2 tablets (5-10 mg) by mouth every 6 hours as needed for moderate to severe pain (Patient not taking: Reported on 4/24/2024), Disp: 12 tablet, Rfl: 0     rosuvastatin (CRESTOR) 20 MG tablet, TAKE 1 TABLET(20 MG) BY MOUTH DAILY, Disp: 90 tablet, Rfl: 1     sildenafil (VIAGRA) 100 MG tablet, Take 1 tablet (100 mg) by mouth daily as needed (Patient not taking: Reported on 4/24/2024), Disp: 30 tablet, Rfl: 4     sildenafil (VIAGRA) 100 MG tablet, Take 1 tablet (100 mg) by mouth daily as needed (30min to 4 hrs before sex.) .  Do not use with nitroglycerin, terazosin or doxazosin., Disp: 18 tablet, Rfl: 3     testosterone (ANDROGEL 1.62 % PUMP) 20.25 MG/ACT gel, Place 2 Pump (40.5 mg) onto the skin daily, Disp: 3 g, Rfl: 1    Current Facility-Administered Medications:      triamcinolone acetonide (KENALOG-10) injection 2 mg, 2 mg, Intra-Lesional, Once, Arash Ariza MD     triamcinolone acetonide (KENALOG-10) injection 4 mg, 4 mg, Intra-Lesional, Once, Arash Ariza MD    Allergies:   Allergies   Allergen Reactions     Wasps [Hornets] Other (See Comments) and Hives     Decreased heart rate.      Cats Blisters     Pollen Extract        Social Hx: retired.   reports that he has never smoked. He has never used smokeless tobacco. He reports that he does not currently use alcohol after a past usage of about 7.0 standard drinks of alcohol per week. He reports that he does not use drugs.    Family Hx: family history includes Asthma in his sister; Cerebrovascular Disease in his mother; Coronary Artery Disease in his maternal grandmother; Hypertension in his mother; Other Cancer in his father.    REVIEW OF SYSTEMS: 10 point ROS neg other than the symptoms  noted above in the HPI and PMH. Notables include  CONSTITUTIONAL:NEGATIVE for fever, chills, change in weight  INTEGUMENTARY/SKIN: NEGATIVE for worrisome rashes, moles or lesions  MUSCULOSKELETAL:See HPI above  NEURO: NEGATIVE for weakness, dizziness or paresthesias    PHYSICAL EXAM:  Ht 1.829 m (6')   Wt 90.7 kg (200 lb)   BMI 27.12 kg/m     GENERAL APPEARANCE: healthy, alert, no distress  SKIN: no suspicious lesions or rashes  NEURO: Normal strength and tone, mentation intact and speech normal  PSYCH:  mentation appears normal and affect normal, not anxious  RESPIRATORY: No increased work of breathing.  HANDS: no clubbing, nail pitting  LYMPH: no palpable popliteal lymphadenopathy.    BILATERAL LOWER EXTREMITIES:  Gait: normal  Alignment: varus  No gross deformities or masses.  No Quad atrophy, strength normal.  Intact sensation deep peroneal nerve, superficial peroneal nerve, med/lat tibial nerve, sural nerve, saphenous nerve  Intact EHL, EDL, TA, FHL, GS, quadriceps hamstrings and hip flexors   Bilateral calf soft and nttp or squeeze.  DTRs: achilles 2+, patella 2+.  Edema: trace    LEFT KNEE EXAM:    Skin: intact, no ecchymosis or erythema  Squat: 100 %, not limited by pain.     ROM: full extension to full flexion  Tight hamstrings on straight leg raise.  Effusion: none  Tender: NTTP med/lat joint line, anterior or posterior knee  McMurrays: negative    MCL: stable, and non-painful at both 0 and 30 degrees knee flexion  Varus stress: stable, and non-painful at both 0 and 30 degrees knee flexion  Lachmans: neg, firm endpoint  Posterior Drawer stable  Patellofemoral joint:                Apprehension: negative              Crepitations: minimal    RIGHT KNEE EXAM:    Skin: intact, no ecchymosis or erythema  Squat: 100 %, not limited by pain.     ROM: full extension to full flexion  Tight hamstrings on straight leg raise.  Effusion: none  Tender: NTTP med/lat joint line, anterior or posterior knee  McMurrays:  "negative    MCL: stable, and non-painful at both 0 and 30 degrees knee flexion  Varus stress: stable, and non-painful at both 0 and 30 degrees knee flexion  Lachmans: neg, firm endpoint  Posterior Drawer stable  Patellofemoral joint:                Apprehension: negative              Crepitations: mild   Grind: mild positive.    X-RAY:  4 vies bilateral knees views 11/1/2024 ([facility] via PACS) were reviewed in clinic today. On my review, no obvious fractures or dislocations. Mild bilateral medial compartment narrowing, left more than right. Anvil osteophytes within the notch. Mild lateral patello-femoral narrowing.         MRI:  MRI right knee from 11/13/2024 , [facility], was reviewed in clinic today (via PACS).  There is moderate lateral patello-femoral chondromalacia with subchondral edema and cystic changes of the lateral patella facet with central fissure. Complex posterior horn medial meniscus tear. There is mild medial compartment chondromalacia.           ASSESSMENT/PLAN: Carlitos Hernandez is a 69 year old male with acute on chronic right knee pain, complex medial meniscus tear, primary osteoarthritis.     * reviewed imaging studies with patient, showing arthritic changes, or wearing of the cartilage in the knee. This can be caused by normal \"wear and tear\" over the years or following prior injury to the knee.  Treatment typically starts nonsurgically. Surgical indication for total knee arthroplasty  when nonsurgical management is no longer effective.  He does have a complex medial meniscus tear, which I suspect is chronic. Unclear how much pain is from medial meniscus tear versus underlying osteoarthritis.  Could consider arthroscopy, medial meniscus debridement, but results unpredictable in setting of osteoarthritis.  At this time, he's doing fine and will monitor.    Non-surgical treatment for knee arthritis includes:    * rest, sitting  * Activity modification - avoid impact activities or activities " "that aggravate symptoms.  * NSAIDS (non-steroidal anti-inflammatory medications; e.g. Aleve, advil, motrin, ibuprofen) - regular use for inflammation ( twice daily or three times daily), with food, as long as no contra-indications Please discuss with primary care doctor if needed  * ice, 15-20 minutes at a time several times a day or as needed.  * Strengthening of quadriceps muscles  * Physical Therapy for strengthening, stretching and range of motion exercises of legs  * Tylenol as needed for pain, consider Tylenol arthritis or similar  * Weight loss: Weight loss:  Body mass index is 27.12 kg/m .. weight loss benefits, not only for the current pain symptoms, but also overall health. Recommend a good diet plan that works for the patient, with the assistance of a dietician or primary care doctor as needed. Also, a good, low-impact exercise program for at least 20 minutes per day, 3 times per week, such as exercise bike, elliptical , or pool.  * Exercise: low impact such as stationary bike, elliptical, pool.  * Injections: cortisone versus viscosupplementation (hyaluronic acid, \"rooster comb\", \"gel shots\"); risks and perceived benefits discussed today. Patient elects NOT to proceed.  * Bracing: bracing the knee may offer some relief of symptoms when worn and provide some stability.  * over the counter supplements such as glucosamine and chondroitin sulfate may help with joint pain.  * topical ointments may help as well    * return to clinic as needed.         Pranav Reed M.D., M.S.  Dept. of Orthopaedic Surgery  [facility]           Again, thank you for allowing me to participate in the care of your patient.        Sincerely,        Pranav Reed MD  "

## 2024-12-19 ENCOUNTER — OFFICE VISIT (OUTPATIENT)
Dept: OPHTHALMOLOGY | Facility: CLINIC | Age: 69
End: 2024-12-19
Attending: OPHTHALMOLOGY
Payer: COMMERCIAL

## 2024-12-19 DIAGNOSIS — H43.813 PVD (POSTERIOR VITREOUS DETACHMENT), BILATERAL: ICD-10-CM

## 2024-12-19 DIAGNOSIS — H02.88B MEIBOMIAN GLAND DYSFUNCTION (MGD), BILATERAL, BOTH UPPER AND LOWER LIDS: ICD-10-CM

## 2024-12-19 DIAGNOSIS — H35.372 EPIRETINAL MEMBRANE, LEFT EYE: ICD-10-CM

## 2024-12-19 DIAGNOSIS — H02.88A MEIBOMIAN GLAND DYSFUNCTION (MGD), BILATERAL, BOTH UPPER AND LOWER LIDS: ICD-10-CM

## 2024-12-19 DIAGNOSIS — H25.13 AGE-RELATED NUCLEAR CATARACT OF BOTH EYES: Primary | ICD-10-CM

## 2024-12-19 DIAGNOSIS — H04.123 BILATERAL DRY EYES: ICD-10-CM

## 2024-12-19 PROCEDURE — 92025 CPTRIZED CORNEAL TOPOGRAPHY: CPT | Performed by: OPHTHALMOLOGY

## 2024-12-19 PROCEDURE — 92015 DETERMINE REFRACTIVE STATE: CPT

## 2024-12-19 PROCEDURE — 76519 ECHO EXAM OF EYE: CPT | Performed by: OPHTHALMOLOGY

## 2024-12-19 ASSESSMENT — EXTERNAL EXAM - LEFT EYE: OS_EXAM: BROW PTOSIS

## 2024-12-19 ASSESSMENT — SLIT LAMP EXAM - LIDS
COMMENTS: MGD, SCURF
COMMENTS: MGD, SCURF

## 2024-12-19 ASSESSMENT — REFRACTION_MANIFEST
OD_ADD: +2.50
OS_CYLINDER: +0.50
OD_CYLINDER: +1.00
OS_AXIS: 107
OS_ADD: +2.50
OS_SPHERE: -2.50
OD_AXIS: 047
OD_SPHERE: -2.25

## 2024-12-19 ASSESSMENT — TONOMETRY
IOP_METHOD: TONOPEN
OD_IOP_MMHG: 16
OS_IOP_MMHG: 18

## 2024-12-19 ASSESSMENT — REFRACTION_WEARINGRX
OS_SPHERE: -2.00
OS_ADD: +2.50
OS_AXIS: 110
OS_CYLINDER: +0.25
OD_ADD: +2.50
OD_CYLINDER: +0.75
OD_SPHERE: -2.00
SPECS_TYPE: PAL
OD_AXIS: 050

## 2024-12-19 ASSESSMENT — CONF VISUAL FIELD
OD_SUPERIOR_NASAL_RESTRICTION: 0
OD_INFERIOR_NASAL_RESTRICTION: 0
OD_NORMAL: 1
OS_NORMAL: 1
OD_INFERIOR_TEMPORAL_RESTRICTION: 0
OD_SUPERIOR_TEMPORAL_RESTRICTION: 0
METHOD: COUNTING FINGERS
OS_SUPERIOR_TEMPORAL_RESTRICTION: 0
OS_SUPERIOR_NASAL_RESTRICTION: 0
OS_INFERIOR_TEMPORAL_RESTRICTION: 0
OS_INFERIOR_NASAL_RESTRICTION: 0

## 2024-12-19 ASSESSMENT — VISUAL ACUITY
OD_BAT_LOW: 20/25
OS_CC+: -2
CORRECTION_TYPE: GLASSES
OS_CC: 20/20
METHOD: SNELLEN - LINEAR
OS_BAT_LOW: 20/25
OD_BAT_HIGH: 20/30
OD_CC: 20/25
OS_BAT_HIGH: 20/25-1
OS_BAT_MED: 20/25-1
OD_BAT_MED: 20/30

## 2024-12-19 ASSESSMENT — CUP TO DISC RATIO
OD_RATIO: 0.55
OS_RATIO: 0.5

## 2024-12-19 NOTE — PROGRESS NOTES
HPI    Pt states vision is not as clear as it used to be. No flashes. Occasional floaters, pt notes he is seeing less floaters. No eye pain today. No glare or halos.  No DM.    HERNANDO Woods December 19, 2024 7:14 AM        Last edited by Akosua Jara COMT on 12/19/2024  7:14 AM.          Review of systems for the eyes was negative other than the pertinent positives/negatives listed in the HPI.      Assessment & Plan      Carlitos Hernandez is a 69 year old male with the following diagnoses:   1. Age-related nuclear cataract of both eyes - Both Eyes    2. Epiretinal membrane, left eye - Left Eye    3. PVD (posterior vitreous detachment), bilateral - Both Eyes    4. Meibomian gland dysfunction (MGD), bilateral, both upper and lower lids    5. Bilateral dry eyes       Referral from Dr. Love for cataract evaluation    S: Bothered by blur in both eyes; overall vision primarily affected by needing to get closer to signs to read them - other factors of day-to-day life not an issue with the patient. 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 No glare, loss of contrast. No previous surgeries, no laser in situ keratomileusis (LASIK), no trauma - takes off glasses to read.    O: 2+ Nuclear sclerosis both eyes, Meibomian gland dysfunction and dryness on exam. C/D 0.5, normal pachmetry and Intraocular pressure's within normal limits. Small area of retinal pigment epithelium disruption on OCT Macula left, will monitor.    Cataract early visually significant right eye   Recommend monitoring and treatment of OSD prior to considering surgery  Minimal change with refraction today, no prescription update recommended         A/PLAN:  - Lid hygiene explained, will treat surface conservatively first  - Artificial tears four times a day as needed   - Return precautions reviewed     Patient disposition:   Return in about 1 year (around  12/19/2025) for DFE, Refraction (BAT), OCT Macula.         Attending Physician Attestation:  Complete documentation of historical and exam elements from today's encounter can be found in the full encounter summary report (not reduplicated in this progress note).  I personally obtained the chief complaint(s) and history of present illness.  I confirmed and edited as necessary the review of systems, past medical/surgical history, family history, social history, and examination findings as documented by others; and I examined the patient myself.  I personally reviewed the relevant tests, images, and reports as documented above.  I formulated and edited as necessary the assessment and plan and discussed the findings and management plan with the patient and family. . Attending Physician Image/Tesing Attestation: I personally reviewed the ophthalmic test(s) associated with this encounter, agree with the interpretation(s) as documented by the resident/fellow, and have edited the corresponding report(s) as necessary.  - Ham Tamez MD

## 2024-12-19 NOTE — NURSING NOTE
Chief Complaints and History of Present Illnesses   Patient presents with    Cataract Evaluation     Chief Complaint(s) and History of Present Illness(es)       Cataract Evaluation               Comments    Pt states vision is not as clear as it used to be. No flashes. Occasional floaters, pt notes he is seeing less floaters. No eye pain today. No glare or halos.  No DM.    HERNANDO Woods December 19, 2024 7:14 AM

## 2024-12-21 DIAGNOSIS — R68.82 DECREASED LIBIDO: ICD-10-CM

## 2024-12-21 DIAGNOSIS — E34.9 HYPOTESTOSTERONEMIA: ICD-10-CM

## 2024-12-24 RX ORDER — TESTOSTERONE 1.62 MG/G
2 GEL TRANSDERMAL DAILY
Qty: 225 G | Refills: 0 | Status: SHIPPED | OUTPATIENT
Start: 2024-12-24

## 2024-12-24 NOTE — TELEPHONE ENCOUNTER
testosterone (ANDROGEL 1.62 % PUMP) 20.25 MG/ACT gel   Start: 06/10/2024   Disp-3 g, R-1     4/24/2024  [facility]  Internal Medicine    Routed because: controlled.

## 2024-12-31 DIAGNOSIS — E78.5 HYPERLIPIDEMIA, UNSPECIFIED HYPERLIPIDEMIA TYPE: ICD-10-CM

## 2025-01-05 RX ORDER — ROSUVASTATIN CALCIUM 20 MG/1
20 TABLET, COATED ORAL DAILY
Qty: 90 TABLET | Refills: 1 | OUTPATIENT
Start: 2025-01-05

## 2025-01-12 ENCOUNTER — HEALTH MAINTENANCE LETTER (OUTPATIENT)
Age: 70
End: 2025-01-12

## 2025-03-24 ENCOUNTER — TELEPHONE (OUTPATIENT)
Dept: INTERNAL MEDICINE | Facility: CLINIC | Age: 70
End: 2025-03-24

## 2025-03-24 DIAGNOSIS — E34.9 HYPOTESTOSTERONEMIA: ICD-10-CM

## 2025-03-24 DIAGNOSIS — E78.5 HYPERLIPIDEMIA, UNSPECIFIED HYPERLIPIDEMIA TYPE: Primary | ICD-10-CM

## 2025-03-24 NOTE — TELEPHONE ENCOUNTER
[facility] Call Center    Phone Message    May a detailed message be left on voicemail: yes     Reason for Call: Order(s): Other:     Reason for requested: Patient is wanting to have Dr. Franco put in a few lab orders. Patient states he is wanting an order for Free Testosterone and Lipid Panel. Patient is wanting to get a call back when these orders have been placed. Please advise.     Date needed: asap    Provider name: Salvador      Action Taken: Message routed to:  [facility]    Travel Screening: Not Applicable     Date of Service:

## 2025-03-25 NOTE — TELEPHONE ENCOUNTER
Called patient and notify lab orders placed by Dr. Franco for lipid, testosterone and CBC w/ diff.  Patient will schedule at a local [facility] lab near home      Anthony Kirk CMA ([facility]) at 8:26 AM on 3/25/2025

## 2025-04-09 ENCOUNTER — LAB (OUTPATIENT)
Dept: LAB | Facility: CLINIC | Age: 70
End: 2025-04-09
Payer: COMMERCIAL

## 2025-04-09 DIAGNOSIS — E78.5 HYPERLIPIDEMIA, UNSPECIFIED HYPERLIPIDEMIA TYPE: ICD-10-CM

## 2025-04-09 DIAGNOSIS — E34.9 HYPOTESTOSTERONEMIA: ICD-10-CM

## 2025-04-09 DIAGNOSIS — R68.82 DECREASED LIBIDO: ICD-10-CM

## 2025-04-09 LAB
BASOPHILS # BLD AUTO: 0 10E3/UL (ref 0–0.2)
BASOPHILS NFR BLD AUTO: 1 %
CHOLEST SERPL-MCNC: 170 MG/DL
EOSINOPHIL # BLD AUTO: 0.2 10E3/UL (ref 0–0.7)
EOSINOPHIL NFR BLD AUTO: 4 %
ERYTHROCYTE [DISTWIDTH] IN BLOOD BY AUTOMATED COUNT: 11.8 % (ref 10–15)
FASTING STATUS PATIENT QL REPORTED: YES
HCT VFR BLD AUTO: 44.3 % (ref 40–53)
HDLC SERPL-MCNC: 47 MG/DL
HGB BLD-MCNC: 15.2 G/DL (ref 13.3–17.7)
IMM GRANULOCYTES # BLD: 0 10E3/UL
IMM GRANULOCYTES NFR BLD: 0 %
LDLC SERPL CALC-MCNC: 95 MG/DL
LYMPHOCYTES # BLD AUTO: 2 10E3/UL (ref 0.8–5.3)
LYMPHOCYTES NFR BLD AUTO: 44 %
MCH RBC QN AUTO: 30.6 PG (ref 26.5–33)
MCHC RBC AUTO-ENTMCNC: 34.3 G/DL (ref 31.5–36.5)
MCV RBC AUTO: 89 FL (ref 78–100)
MONOCYTES # BLD AUTO: 0.4 10E3/UL (ref 0–1.3)
MONOCYTES NFR BLD AUTO: 10 %
NEUTROPHILS # BLD AUTO: 1.9 10E3/UL (ref 1.6–8.3)
NEUTROPHILS NFR BLD AUTO: 42 %
NONHDLC SERPL-MCNC: 123 MG/DL
PLATELET # BLD AUTO: 185 10E3/UL (ref 150–450)
RBC # BLD AUTO: 4.96 10E6/UL (ref 4.4–5.9)
SHBG SERPL-SCNC: 17 NMOL/L (ref 11–80)
TRIGL SERPL-MCNC: 140 MG/DL
WBC # BLD AUTO: 4.5 10E3/UL (ref 4–11)

## 2025-04-09 PROCEDURE — 36415 COLL VENOUS BLD VENIPUNCTURE: CPT

## 2025-04-09 PROCEDURE — 85025 COMPLETE CBC W/AUTO DIFF WBC: CPT

## 2025-04-09 PROCEDURE — 84270 ASSAY OF SEX HORMONE GLOBUL: CPT

## 2025-04-09 PROCEDURE — 80061 LIPID PANEL: CPT

## 2025-04-10 SDOH — HEALTH STABILITY: PHYSICAL HEALTH: ON AVERAGE, HOW MANY MINUTES DO YOU ENGAGE IN EXERCISE AT THIS LEVEL?: 90 MIN

## 2025-04-10 SDOH — HEALTH STABILITY: PHYSICAL HEALTH: ON AVERAGE, HOW MANY DAYS PER WEEK DO YOU ENGAGE IN MODERATE TO STRENUOUS EXERCISE (LIKE A BRISK WALK)?: 7 DAYS

## 2025-04-10 ASSESSMENT — SOCIAL DETERMINANTS OF HEALTH (SDOH): HOW OFTEN DO YOU GET TOGETHER WITH FRIENDS OR RELATIVES?: TWICE A WEEK

## 2025-04-15 ENCOUNTER — OFFICE VISIT (OUTPATIENT)
Dept: INTERNAL MEDICINE | Facility: CLINIC | Age: 70
End: 2025-04-15
Payer: COMMERCIAL

## 2025-04-15 VITALS
SYSTOLIC BLOOD PRESSURE: 153 MMHG | HEIGHT: 72 IN | HEART RATE: 74 BPM | RESPIRATION RATE: 14 BRPM | BODY MASS INDEX: 27.27 KG/M2 | OXYGEN SATURATION: 96 % | DIASTOLIC BLOOD PRESSURE: 85 MMHG | TEMPERATURE: 98 F | WEIGHT: 201.3 LBS

## 2025-04-15 DIAGNOSIS — E78.5 HYPERLIPIDEMIA, UNSPECIFIED HYPERLIPIDEMIA TYPE: ICD-10-CM

## 2025-04-15 DIAGNOSIS — E34.9 HYPOTESTOSTERONEMIA: ICD-10-CM

## 2025-04-15 DIAGNOSIS — R68.82 DECREASED LIBIDO: ICD-10-CM

## 2025-04-15 DIAGNOSIS — Z12.11 SPECIAL SCREENING FOR MALIGNANT NEOPLASMS, COLON: Primary | ICD-10-CM

## 2025-04-15 DIAGNOSIS — M75.41 IMPINGEMENT SYNDROME, SHOULDER, RIGHT: ICD-10-CM

## 2025-04-15 PROCEDURE — 3079F DIAST BP 80-89 MM HG: CPT | Performed by: INTERNAL MEDICINE

## 2025-04-15 PROCEDURE — 99397 PER PM REEVAL EST PAT 65+ YR: CPT | Performed by: INTERNAL MEDICINE

## 2025-04-15 PROCEDURE — 3077F SYST BP >= 140 MM HG: CPT | Performed by: INTERNAL MEDICINE

## 2025-04-15 RX ORDER — ROSUVASTATIN CALCIUM 20 MG/1
20 TABLET, COATED ORAL DAILY
Qty: 90 TABLET | Refills: 1 | Status: SHIPPED | OUTPATIENT
Start: 2025-04-15 | End: 2025-04-17

## 2025-04-15 RX ORDER — TESTOSTERONE 1.62 MG/G
3 GEL TRANSDERMAL DAILY
Qty: 5 G | Refills: 3 | Status: SHIPPED | OUTPATIENT
Start: 2025-04-15

## 2025-04-15 NOTE — PROGRESS NOTES
HPI  69-year-old presents today for wellness visit.  He has multiple concerns which he attributes to getting older.  He is on the testosterone replacement the levels have not responded to the dose of gel at 2 pumps daily we recently increased this to 3 pumps he wanted to discuss alternative treatments we discussed the long-term 10-week testosterone injections versus the testosterone cypionate at the present time he will stick with the 3 pumps a day we will plan to recheck the testosterone in a couple of months and then decide regarding continued or alternative delivery methods.  He has had some pain and stiffness in the thumbs as well as in the 2nd and 3rd MCP joints of the left hand this apparently flared at 1 point associated with some stiffness but then has settled down he is not having much in the way of pain or discomfort at the present time.  He has had ongoing issues with impingement in the right shoulder.  He has been doing rotator cuff exercises rather faithfully for this yet still has some difficulties particularly with flyfishing and casting particularly if his right shoulder is abducted when he casts.  We discussed a evaluation with physical therapy to modify his rehabilitation program and he agreed.  We discussed his colon cancer screening and he will go with colonoscopy at this point.  He is exercising regularly he is eating healthy he is sleeping reasonably well.  He has recently gotten hearing aids and is getting adapted to those.  Past Medical History:   Diagnosis Date    Arthritis 10/24    Mild in hands and knees    Hyperlipemia     It band syndrome, right 11/23/2016    Labile hypertension     Nonsenile cataract 1/23    Plantar fasciitis 02/19/2018     Past Surgical History:   Procedure Laterality Date    COLONOSCOPY  2014    CRANIOTOMY, REPAIR ANEURYSM, COMBINED Right 08/09/2018    Procedure:  RIGHT CRANIOTOMY AND CLIPPING OF RIGHT CAROTID TERMINUS ANEURYSM, COMPLEX INTRAOPERATIVE ANGIOGRAMS;   "Surgeon: Tigist Davidson MD;  Location: [facility] OR;  Service:     IR CEREBRAL ANGIOGRAM  08/09/2018    rt craaniotomy for anerysm clipping  08/2018    SPERMATOCELECTOMY Right 3/6/2024    Procedure: EXCISION, SPERMATOCELE;  Surgeon: Ham Shay MD;  Location:  OR    thyroglossal duct cystectomy      THYROID SURGERY      TONSILLECTOMY      VASECTOMY       Family History   Problem Relation Age of Onset    Coronary Artery Disease Maternal Grandmother     Other Cancer Father         liver    Cancer Father     Hypertension Mother     Cerebrovascular Disease Mother     Asthma Sister     Diabetes No family hx of     Glaucoma No family hx of     Macular Degeneration No family hx of          Exam:  BP (!) 153/85 (BP Location: Right arm, Patient Position: Sitting, Cuff Size: Adult Regular)   Pulse 74   Temp 98  F (36.7  C) (Oral)   Resp 14   Ht 1.816 m (5' 11.5\")   Wt 91.3 kg (201 lb 4.8 oz)   SpO2 96%   BMI 27.68 kg/m    201 lbs 4.8 oz  Physical Exam   The patient is alert, oriented with a clear sensorium.   Skin shows dermatofibroma left inner thigh, no lesions or rashes and good turgor.   Head is normocephalic and atraumatic.   Eyes show PERRLA  Ears show normal TMs bilaterally.   Mouth shows clear oral mucosa.   Neck shows no nodes, thyromegaly or bruits.   Back is non tender.  Lungs are clear to percussion and auscultation.   Heart shows normal S1 and S2 without murmur or gallop.   Abdomen is soft and nontender without masses or organomegaly.   Extremities show degenerative changes IP both thumbs, left MCP 2,3 no edema and no evidence of active synovitis.  Right shoulder shows positive drop arm test weakness of the supraspinatus he has Neer and pocking's impingement signs however are negative and his internal/external rotation strength are good  Neurologic examination shows cranial nerves II-XII intact. Motor shows 5/5 strength. Reflexes are symmetric. Cerebellar testing shows normal tandem " gait.  Romberg negative.      ASSESSMENT  1 hypotestosteronism under replaced  2 hypertension needs follow-up probable whitecoat  3 hyperlipidemia on rosuvastatin  4 IGT  5 small bilateral inguinal hernias  6 DJD hands  7 mild right shoulder rotator cuff strain will refer to physical therapy    Plan  Will continue with the present dose of the testosterone replacement refer him to physical therapy for his shoulder refer for colonoscopy refilled the atorvastatin and the testosterone gel    This note was completed using Dragon voice recognition software.      Ovidio Franoc MD  General Internal Medicine  [facility]  [phone]

## 2025-04-16 ENCOUNTER — THERAPY VISIT (OUTPATIENT)
Dept: PHYSICAL THERAPY | Facility: REHABILITATION | Age: 70
End: 2025-04-16
Attending: INTERNAL MEDICINE
Payer: COMMERCIAL

## 2025-04-16 DIAGNOSIS — M75.41 IMPINGEMENT SYNDROME, SHOULDER, RIGHT: ICD-10-CM

## 2025-04-16 PROCEDURE — 97110 THERAPEUTIC EXERCISES: CPT | Mod: GP | Performed by: PHYSICAL THERAPIST

## 2025-04-16 PROCEDURE — 97161 PT EVAL LOW COMPLEX 20 MIN: CPT | Mod: GP | Performed by: PHYSICAL THERAPIST

## 2025-04-16 ASSESSMENT — ACTIVITIES OF DAILY LIVING (ADL)
REMOVING_SOMETHING_FROM_YOUR_BACK_POCKET: 0
REACHING_FOR_SOMETHING_ON_A_HIGH_SHELF: 1
CARRYING_A_HEAVY_OBJECT_OF_10_POUNDS: 1
AT_ITS_WORST?: 4
WASHING_YOUR_BACK: 1
PUSHING_WITH_THE_INVOLVED_ARM: 1
PUTTING_ON_A_SHIRT_THAT_BUTTONS_DOWN_THE_FRONT: 0
PUTTING_ON_AN_UNDERSHIRT_OR_A_PULLOVER_SWEATER: 0
PUTTING_ON_YOUR_PANTS: 0
WHEN_LYING_ON_THE_INVOLVED_SIDE: 1
PLEASE_INDICATE_YOR_PRIMARY_REASON_FOR_REFERRAL_TO_THERAPY:: SHOULDER
WASHING_YOUR_HAIR?: 0
PLACING_AN_OBJECT_ON_A_HIGH_SHELF: 1
TOUCHING_THE_BACK_OF_YOUR_NECK: 1

## 2025-04-16 NOTE — PROGRESS NOTES
PHYSICAL THERAPY EVALUATION  Type of Visit: Evaluation       Fall Risk Screen:  Have you fallen 2 or more times in the past year?: No  Have you fallen and had an injury in the past year?: No    Subjective         Presenting condition or subjective complaint: Chronic right shoulder pain.  Date of onset: 04/12/25    Relevant medical history: Arthritis; Hearing problems   Dates & types of surgery: 1995 - left knee meniscus repair    Prior diagnostic imaging/testing results:       Prior therapy history for the same diagnosis, illness or injury: No      Prior Level of Function  Transfers:   Ambulation:   ADL:   IADL:     Living Environment  Social support: With a significant other or spouse   Type of home: House   Stairs to enter the home: No       Ramp: No   Stairs inside the home: Yes 15 Is there a railing: Yes     Help at home: None  Equipment owned:       Employment: No    Hobbies/Interests: Fly fishing, carpentry    Patient goals for therapy: Comfortably use my right arm in an elevated position.    Pain assessment:    The patient reports that his shoulder pain started bothering him in the mid 90's it always has been in the right shoulder.  Did some passive hangs and that helped at that point in time.  Had TOS on the right shoulder.  Lost a lot of muscle mass in 1996.  Did scalene muscle stretches to help.  Has had an EMG and a MRI.  Another issue minor in 2000 with thoracic outlet syndrome.  Occasionally will feel weirdness in the upper trap and deltoid pain.  Ever since then will have problems in the shoulder.  Fly fishing will increase pain patient has noted increase in shoulder pain to more constant after flyfishing the last few days..  Throwing increases pain  Have had flare ups up off-and-on.  Don't want it to get worse.  Want a routine for PT to increase mobility and decrease pain.  5-8 years ago started strengthening.     Patient is currently doing prone I,T, Y,and W, hangs and rotator cuff with a weight  stack.   Changing a light bulb causes 2-3/10 pain supraspinatus region pain on the right shoulder  Right shoulder higher but right hand dominant.   Per patient left arm is stronger than right .  Also with bicep curls then tender at the bicep tendon.  35 pounds with dumbbells 10-12 reps.      Objective   SHOULDER EVALUATION  PAIN: Pain Level at Rest: 1/10  Pain Level with Use: 3/10  Pain Location:   Pain Quality: Aching  Pain Frequency: intermittent  Pain is Worst: daytime  Pain is Exacerbated By: fly fishing, laying on right side reaching overhead increases pain  thumb down abduction increases pain  Pain is Relieved By: rest  Pain Progression: comes and goes depending on activity level and activity type.  INTEGUMENTARY (edema, incisions):   POSTURE:   GAIT:   Weightbearing Status:   Assistive Device(s):   Gait Deviations:   BALANCE/PROPRIOCEPTION:   WEIGHTBEARING ALIGNMENT:   ROM:   (Degrees) Left AROM Left PROM Right AROM  Right PROM   Shoulder Flexion 170  170    Shoulder Extension       Shoulder Abduction 165  162    Shoulder Adduction       Shoulder Internal Rotation T6  T6 Slight symptoms on the right superior shoulder    Shoulder External Rotation 84  83    Shoulder Horizontal Abduction       Shoulder Horizontal Adduction       Shoulder Flexion ER       Shoulder Flexion IR       Elbow Extension       Elbow Flexion       Pain:   End feel:    cervical spine ROM WNL no pain.  Increased upper rotation of the scapula on the right with shoulder flexion and abduction.    In standing T8 right rotation, slump seated T5,6,7,8,9 right rotated       STRENGTH:   Pain: - none + mild ++ moderate +++ severe  Strength Scale: 0-5/5 Left Right   Shoulder Flexion 5 5, + (mild)   Shoulder Extension     Shoulder Abduction 5 5, + (mild)    Shoulder Adduction     Shoulder Internal Rotation 5 5   Shoulder External Rotation 5 5   Shoulder Horizontal Abduction     Shoulder Horizontal Adduction     Elbow Flexion     Elbow Extension      Mid Trap 5 5   Lower Trap 5 5   Rhomboid     Serratus Anterior     Very slight give way with flexion and abduction on the right with manual muscle testing still grading of 5/5 but slightly weaker right versus left.  Patient had a 1 out of 10 pain with flexion and 2 out of 10 pain with abduction    Speeds test symptoms posterior shoulder     No tenderness to the touch.  Right Neer's 5/10 pain   S/C joint  stiffer right versus left.  A/C joint stiffer right more than left    Slightly elevated first rib on the right positive Gatito's test  for the right.  No pain with palpation.  FLEXIBILITY:   SPECIAL TESTS:   PALPATION:   JOINT MOBILITY:   CERVICAL SCREEN:     Assessment & Plan   CLINICAL IMPRESSIONS  Medical Diagnosis: Impingement syndrome, shoulder, right    Treatment Diagnosis: Impingement syndrome, shoulder, right   Impression/Assessment: Patient is a 69 year old male with right shoulder supraspinatus region and posterior deltoid pain intermittently with increased activity with the shoulder such as flyfishing, throwing, and laying on the shoulder.  Complaints.  The following significant findings have been identified increase scapular winging with shoulder motion both flexion and abduction on the right, tightness in the AC SC joint, tightness in the clavicular portion of the pectoralis muscles, patient has an elevated first rib on the right, positive Neer's test slight increase in pain with shoulder flexion and abduction.: Pain, Decreased strength, Impaired muscle performance, and Decreased activity tolerance. These impairments interfere with their ability to perform recreational activities as compared to previous level of function.     Clinical Decision Making (Complexity):  Clinical Presentation: Stable/Uncomplicated  Clinical Presentation Rationale: based on medical and personal factors listed in PT evaluation  Clinical Decision Making (Complexity): Low complexity    PLAN OF CARE  Treatment  Interventions:  Modalities: Cryotherapy, Cupping, E-stim, Hot Pack, Ultrasound, check precautions before doing any modalities  Interventions: Manual Therapy, Neuromuscular Re-education, Therapeutic Activity, Therapeutic Exercise, Self-Care/Home Management, manual neural mobilization    Long Term Goals     PT Goal 1  Goal Identifier: HEP  Goal Description: The patient will demonstrate independence in home exercise program to aid in home management of symptoms.  Rationale: to maximize safety and independence with performance of ADLs and functional tasks  Target Date: 07/09/25  PT Goal 2  Goal Identifier: Patient to have full shoulder flexion and abduction strength without increase in pain to be able to return to throwing a ball without increase in pain.  Goal Description: Patient to have full shoulder flexion and abduction strength without increase in pain to be able to return to throwing a ball without increase in pain.  Rationale: to maximize safety and independence with performance of ADLs and functional tasks  Target Date: 07/09/25  PT Goal 3  Goal Identifier: Scapulohumeral rhythm within normal limits no increase in pain with any shoulder motions.  Goal Description: Scapulohumeral rhythm within normal limits no increase in pain with any shoulder motions.  Rationale: to maximize safety and independence with performance of ADLs and functional tasks  Target Date: 07/09/25      Frequency of Treatment: 1 time per week  Duration of Treatment: 12 weeks    Recommended Referrals to Other Professionals:   Education Assessment:   Learner/Method: Patient    Risks and benefits of evaluation/treatment have been explained.   Patient/Family/caregiver agrees with Plan of Care.     Evaluation Time:     PT Eval, Low Complexity Minutes (81075): 25       Signing Clinician: Vicki Gomez PT        [facility]                                                                                    OUTPATIENT PHYSICAL THERAPY      PLAN OF TREATMENT FOR OUTPATIENT REHABILITATION   Patient's Last Name, First Name, Carlitos Maldonado YOB: 1955   Provider's Name   [facility]   Medical Record No.  [id number]     Onset Date: 04/12/25  Start of Care Date: 04/16/25     Medical Diagnosis:  Impingement syndrome, shoulder, right      PT Treatment Diagnosis:  Impingement syndrome, shoulder, right Plan of Treatment  Frequency/Duration: 1 time per week/ 12 weeks    Certification date from 04/16/25 to 07/09/25         See note for plan of treatment details and functional goals     Vicki Gomez, PT                         I CERTIFY THE NEED FOR THESE SERVICES FURNISHED UNDER        THIS PLAN OF TREATMENT AND WHILE UNDER MY CARE     (Physician attestation of this document indicates review and certification of the therapy plan).              Referring Provider:  Ovidio Franco    Initial Assessment  See Epic Evaluation- Start of Care Date: 04/16/25

## 2025-04-17 ENCOUNTER — MYC MEDICAL ADVICE (OUTPATIENT)
Dept: INTERNAL MEDICINE | Facility: CLINIC | Age: 70
End: 2025-04-17
Payer: COMMERCIAL

## 2025-04-17 ENCOUNTER — MYC REFILL (OUTPATIENT)
Dept: INTERNAL MEDICINE | Facility: CLINIC | Age: 70
End: 2025-04-17
Payer: COMMERCIAL

## 2025-04-17 DIAGNOSIS — E78.5 HYPERLIPIDEMIA, UNSPECIFIED HYPERLIPIDEMIA TYPE: ICD-10-CM

## 2025-04-17 RX ORDER — ROSUVASTATIN CALCIUM 20 MG/1
20 TABLET, COATED ORAL DAILY
Qty: 90 TABLET | Refills: 1 | Status: SHIPPED | OUTPATIENT
Start: 2025-04-17

## 2025-04-17 RX ORDER — ROSUVASTATIN CALCIUM 20 MG/1
20 TABLET, COATED ORAL DAILY
Qty: 90 TABLET | Refills: 1 | Status: SHIPPED | OUTPATIENT
Start: 2025-04-17 | End: 2025-04-17

## 2025-04-17 NOTE — TELEPHONE ENCOUNTER
rosuvastatin (CRESTOR) 20 MG tablet 90 tablet 1 4/15/2025   Remaining refills sent to requested pharmacy.      E-Prescribing Status: Transmission to pharmacy failed (4/17/2025  1:10 PM CDT)     Re sent

## 2025-04-21 DIAGNOSIS — E34.9 HYPOTESTOSTERONEMIA: ICD-10-CM

## 2025-04-21 DIAGNOSIS — R68.82 DECREASED LIBIDO: ICD-10-CM

## 2025-04-21 DIAGNOSIS — E78.5 HYPERLIPIDEMIA, UNSPECIFIED HYPERLIPIDEMIA TYPE: ICD-10-CM

## 2025-04-21 RX ORDER — TESTOSTERONE 1.62 MG/G
3 GEL TRANSDERMAL DAILY
Qty: 5 G | Refills: 1 | Status: SHIPPED | OUTPATIENT
Start: 2025-04-21

## 2025-04-21 RX ORDER — ROSUVASTATIN CALCIUM 20 MG/1
20 TABLET, COATED ORAL DAILY
Qty: 90 TABLET | Refills: 1 | Status: SHIPPED | OUTPATIENT
Start: 2025-04-21 | End: 2025-04-21

## 2025-04-21 NOTE — TELEPHONE ENCOUNTER
[facility] Call Center    Phone Message    May a detailed message be left on voicemail: yes     Reason for Call: Medication Question or concern regarding medication   Prescription Clarification  Name of Medication: rosuvastatin (CRESTOR) 20 MG tablet,   testosterone (ANDROGEL 1.62 % PUMP) 20.25 MG/ACT gel   Prescribing Provider: Dr. Franco   Pharmacy:   [id number] [place] [address]      What on the order needs clarification? Pt called and stated his pharmacy did not receive the script of Crestor, pt requesting that be re-sent to them. Pt is also needing the testosterone script re-sent. Pt is needing 5 bottles sent not 5g to be a 90day supply

## 2025-04-21 NOTE — TELEPHONE ENCOUNTER
Pt called and stated his pharmacy did not receive the script of Crestor, pt requesting that be re-sent to them.   Pt is also needing the testosterone script re-sent. Pt is needing 5 bottles sent not 5g to be a 90day supply     Last Written Prescription:  rosuvastatin (CRESTOR) 20 MG tablet   90 tablet 1 4/17/2025     Refill decision: Medication refilled per  Medication Refill in Ambulatory Care  policy.   Signed under fulfilled orders,  resent to Pharmacy           testosterone (ANDROGEL 1.62 % PUMP) 20.25 MG/ACT gel     5 g 3 4/15/2025     Pt is also needing the testosterone script re-sent. Pt is needing 5 bottles sent not 5g to be a 90day suppl      testosterone (ANDROGEL 1.62 % PUMP) 20.25 MG/ACT gel 5 g 3     Sig: Place 3 Pump (60.75 mg) onto the skin daily.       Androgen Agents Failed - 4/21/2025 10:31 AM        Failed - ALT on file within past 12 mos     Recent Labs   Lab Test 11/02/23  0808   ALT 36             Failed - Refills for this classification require provider review        Failed - Most recent blood pressure under 140/90 in past 6 months     BP Readings from Last 3 Encounters:   04/15/25 (!) 153/85   04/24/24 (!) 159/93   03/21/24 126/85       No data recorded            Failed - AST on file within past 12 mos     Recent Labs   Lab Test 11/02/23  0808   AST 29             Passed - Patient is of age 12 and older        Passed - Lipid panel on file in past 12 mos     Recent Labs   Lab Test 04/09/25  0749   CHOL 170   TRIG 140   HDL 47   LDL 95   NHDL 123               Passed - Medication is active on med list and the sig matches. RN to manually verify dose and sig if red X/fail.     If the protocol passes (green check), you do not need to verify med dose and sig.    A prescription matches if they are the same clinical intention.    For Example: once daily and every morning are the same.    The protocol can not identify upper and lower case letters as matching and will fail.     For Example: Take 1  "tablet (50 mg) by mouth daily     TAKE 1 TABLET (50 MG) BY MOUTH DAILY    For all fails (red x), verify dose and sig.    If the refill does match what is on file, the RN can still proceed to approve the refill request.       If they do not match, route to the appropriate provider.             Passed - HCT less than 54% on file within past 12 mos     Recent Labs   Lab Test 04/09/25  0750   HCT 44.3             Passed - Serum Testosterone on file within past 12 mos     Recent Labs   Lab Test 04/09/25  0749   TESTOSTTOTAL 105*             Passed - Serum PSA on file within past 12 mos     Lab Results   Component Value Date    PSA 2.37 04/26/2024             Passed - Recent (6 mo) or future (90 days) visit within the authorizing provider's specialty     Patient had office visit in the last 6 months or has a visit in the next 30 days with authorizing provider or within the authorizing provider's specialty.  See \"Patient Info\" tab in inbasket, or \"Choose Columns\" in Meds & Orders section of the refill encounter.            Passed - Patient is not pregnant        Passed - No positive pregnancy test on file within past 12 mos             "

## 2025-04-22 ENCOUNTER — THERAPY VISIT (OUTPATIENT)
Dept: PHYSICAL THERAPY | Facility: REHABILITATION | Age: 70
End: 2025-04-22
Payer: COMMERCIAL

## 2025-04-22 DIAGNOSIS — M75.41 IMPINGEMENT SYNDROME, SHOULDER, RIGHT: Primary | ICD-10-CM

## 2025-04-22 PROCEDURE — 97140 MANUAL THERAPY 1/> REGIONS: CPT | Mod: GP | Performed by: PHYSICAL THERAPIST

## 2025-04-22 PROCEDURE — 97110 THERAPEUTIC EXERCISES: CPT | Mod: GP | Performed by: PHYSICAL THERAPIST

## 2025-04-22 RX ORDER — ROSUVASTATIN CALCIUM 20 MG/1
20 TABLET, COATED ORAL DAILY
Qty: 90 TABLET | Refills: 1 | OUTPATIENT
Start: 2025-04-22

## 2025-04-22 ASSESSMENT — ACTIVITIES OF DAILY LIVING (ADL)
PUTTING_ON_A_SHIRT_THAT_BUTTONS_DOWN_THE_FRONT: 0
WASHING_YOUR_HAIR?: 0
REMOVING_SOMETHING_FROM_YOUR_BACK_POCKET: 0
PLACING_AN_OBJECT_ON_A_HIGH_SHELF: 1
PLEASE_INDICATE_YOR_PRIMARY_REASON_FOR_REFERRAL_TO_THERAPY:: SHOULDER
PUTTING_ON_AN_UNDERSHIRT_OR_A_PULLOVER_SWEATER: 0
PUSHING_WITH_THE_INVOLVED_ARM: 0
TOUCHING_THE_BACK_OF_YOUR_NECK: 0
AT_ITS_WORST?: 0
CARRYING_A_HEAVY_OBJECT_OF_10_POUNDS: 0
WASHING_YOUR_BACK: 0
PUTTING_ON_YOUR_PANTS: 0
REACHING_FOR_SOMETHING_ON_A_HIGH_SHELF: 1
WHEN_LYING_ON_THE_INVOLVED_SIDE: 1

## 2025-04-22 NOTE — TELEPHONE ENCOUNTER
Medication refill for rosuvastatin (CRESTOR) 20 MG tablet sent to patients pharmacy yesterday 4/21/25    Nimoc Barrios RN on 4/22/2025 at 9:59 AM

## 2025-05-27 ENCOUNTER — TELEPHONE (OUTPATIENT)
Dept: GASTROENTEROLOGY | Facility: CLINIC | Age: 70
End: 2025-05-27
Payer: COMMERCIAL

## 2025-05-27 NOTE — TELEPHONE ENCOUNTER
"Endoscopy Scheduling Screen    Caller: call to patient    Have you had any respiratory illness or flu-like symptoms in the last 10 days?  No    What is your communication preference for Instructions and/or Bowel Prep?   Yeny    What insurance is in the chart?  Other:  [employer]    Ordering/Referring Provider: RAJENDRA MAHONEY   (If ordering provider performs procedure, schedule with ordering provider unless otherwise instructed. )    BMI: Estimated body mass index is 27.68 kg/m  as calculated from the following:    Height as of 4/15/25: 1.816 m (5' 11.5\").    Weight as of 4/15/25: 91.3 kg (201 lb 4.8 oz).     Sedation Ordered  moderate sedation.   If patient BMI > 50 do not schedule in [facility].    If patient BMI > 45 do not schedule at [facility].    Are you taking methadone or Suboxone?  NO, No RN review required.    Have you been diagnosed and are being treated for severe PTSD or severe anxiety?  NO, No RN review required.    Are you taking any prescription medications for pain 3 or more times per week?   NO, No RN review required.      Do you have a history of malignant hyperthermia?  No      (Females) Are you currently pregnant?        Have you been diagnosed or told you have pulmonary hypertension?   No      Do you have an LVAD?  No      Have you been told you have moderate to severe sleep apnea?  No.      Have you been told you have COPD, asthma, or any other lung disease?  Yes     What breathing problems do you have?  Asthma SEEAON     Do you use home oxygen?  No    Have your breathing problems required an ED visit or hospitalization in the last year?  No.      Has your doctor ordered any cardiac tests like echo, angiogram, stress test, ablation, or EKG, that you have not completed yet?  No      Do you  have a history of any heart conditions?  Yes   30 YRS AGO AFIB    Have you had any hospitalizations  in the last year for heart related issues, for example a stent placement, heart attack, or " "cardiomyopathy?  No    Do you have any implantable devices in your body (pacemaker, ICD)?  No    Do you take nitroglycerine?  No      Have you ever had or are you waiting for an organ transplant?  No. Continue scheduling, no site restrictions.      Have you had a stroke or transient ischemic attack (TIA aka \"mini stroke\") in the last 2 years?   No.      Have you been diagnosed with or been told you have cirrhosis of the liver?   No.      Are you currently on dialysis?   No      Do you need assistance transferring?   No    BMI: Estimated body mass index is 27.68 kg/m  as calculated from the following:    Height as of 4/15/25: 1.816 m (5' 11.5\").    Weight as of 4/15/25: 91.3 kg (201 lb 4.8 oz).     Is patients BMI > 40 and scheduling location [facility]?  No    Do you take an injectable or oral medication for weight loss or diabetes (excluding insulin)?  No    Do you take the medication Naltrexone?  No    Do you take blood thinners?  No       Prep   Are you currently on dialysis or do you have chronic kidney disease?  No    Do you have a diagnosis of diabetes?  No    Do you have a diagnosis of cystic fibrosis (CF)?  No    On a regular basis do you go 3 -5 days between bowel movements?  No    BMI > 40?  No    Preferred Pharmacy:    [id number] - [place] [address] [place] [address] & [place] [address] [address]  [facility] [address]  Phone: [phone] Fax: [phone]      Final Scheduling Details     Procedure scheduled  Colonoscopy    Surgeon:  TRAE     Date of procedure:  9/8/25     Pre-OP / PAC:   No - Not required for this site.    [place]  [facility] [place]    Sedation   MAC/Deep Sedation PER SITE      Patient Reminders:   You will receive a call from a Nurse to review instructions and health history.  This assessment must be completed prior to your procedure.  Failure to complete the Nurse assessment may result in the procedure being cancelled.      On " the day of your procedure, please designate an adult(s) who can drive you home stay with you for the next 24 hours. The medicines used in the exam will make you sleepy. You will not be able to drive.      You cannot take public transportation, ride share services, or non-medical taxi service without a responsible caregiver.  Medical transport services are allowed with the requirement that a responsible caregiver will receive you at your destination.  We require that drivers and caregivers are confirmed prior to your procedure.

## 2025-05-29 ENCOUNTER — THERAPY VISIT (OUTPATIENT)
Dept: PHYSICAL THERAPY | Facility: REHABILITATION | Age: 70
End: 2025-05-29
Payer: COMMERCIAL

## 2025-05-29 DIAGNOSIS — M75.41 IMPINGEMENT SYNDROME, SHOULDER, RIGHT: Primary | ICD-10-CM

## 2025-06-10 ENCOUNTER — THERAPY VISIT (OUTPATIENT)
Dept: PHYSICAL THERAPY | Facility: REHABILITATION | Age: 70
End: 2025-06-10
Payer: COMMERCIAL

## 2025-06-10 DIAGNOSIS — M75.41 IMPINGEMENT SYNDROME, SHOULDER, RIGHT: Primary | ICD-10-CM

## 2025-06-10 PROCEDURE — 97112 NEUROMUSCULAR REEDUCATION: CPT | Mod: GP | Performed by: PHYSICAL THERAPIST

## 2025-06-10 PROCEDURE — 97110 THERAPEUTIC EXERCISES: CPT | Mod: GP | Performed by: PHYSICAL THERAPIST

## 2025-06-17 ENCOUNTER — THERAPY VISIT (OUTPATIENT)
Dept: PHYSICAL THERAPY | Facility: REHABILITATION | Age: 70
End: 2025-06-17
Payer: COMMERCIAL

## 2025-06-17 DIAGNOSIS — M75.41 IMPINGEMENT SYNDROME, SHOULDER, RIGHT: Primary | ICD-10-CM

## 2025-06-17 PROCEDURE — 97110 THERAPEUTIC EXERCISES: CPT | Mod: GP | Performed by: PHYSICAL THERAPIST

## 2025-06-17 PROCEDURE — 97112 NEUROMUSCULAR REEDUCATION: CPT | Mod: GP | Performed by: PHYSICAL THERAPIST

## 2025-07-23 ENCOUNTER — LAB (OUTPATIENT)
Dept: LAB | Facility: CLINIC | Age: 70
End: 2025-07-23
Payer: COMMERCIAL

## 2025-07-23 DIAGNOSIS — E34.9 HYPOTESTOSTERONEMIA: ICD-10-CM

## 2025-07-23 LAB
BASOPHILS # BLD AUTO: 0 10E3/UL (ref 0–0.2)
BASOPHILS NFR BLD AUTO: 1 %
EOSINOPHIL # BLD AUTO: 0.1 10E3/UL (ref 0–0.7)
EOSINOPHIL NFR BLD AUTO: 3 %
ERYTHROCYTE [DISTWIDTH] IN BLOOD BY AUTOMATED COUNT: 11.8 % (ref 10–15)
HCT VFR BLD AUTO: 45.1 % (ref 40–53)
HGB BLD-MCNC: 15.7 G/DL (ref 13.3–17.7)
IMM GRANULOCYTES # BLD: 0 10E3/UL
IMM GRANULOCYTES NFR BLD: 0 %
LYMPHOCYTES # BLD AUTO: 1.9 10E3/UL (ref 0.8–5.3)
LYMPHOCYTES NFR BLD AUTO: 49 %
MCH RBC QN AUTO: 31 PG (ref 26.5–33)
MCHC RBC AUTO-ENTMCNC: 34.8 G/DL (ref 31.5–36.5)
MCV RBC AUTO: 89 FL (ref 78–100)
MONOCYTES # BLD AUTO: 0.3 10E3/UL (ref 0–1.3)
MONOCYTES NFR BLD AUTO: 8 %
NEUTROPHILS # BLD AUTO: 1.5 10E3/UL (ref 1.6–8.3)
NEUTROPHILS NFR BLD AUTO: 39 %
PLATELET # BLD AUTO: 180 10E3/UL (ref 150–450)
RBC # BLD AUTO: 5.06 10E6/UL (ref 4.4–5.9)
SHBG SERPL-SCNC: 17 NMOL/L (ref 11–80)
WBC # BLD AUTO: 3.9 10E3/UL (ref 4–11)

## 2025-07-23 PROCEDURE — 85025 COMPLETE CBC W/AUTO DIFF WBC: CPT

## 2025-07-23 PROCEDURE — 84270 ASSAY OF SEX HORMONE GLOBUL: CPT

## 2025-07-23 PROCEDURE — 36415 COLL VENOUS BLD VENIPUNCTURE: CPT

## 2025-07-27 LAB
TESTOST FREE SERPL-MCNC: 2.72 NG/DL
TESTOST SERPL-MCNC: 106 NG/DL (ref 240–950)

## 2025-07-28 ENCOUNTER — RESULTS FOLLOW-UP (OUTPATIENT)
Dept: INTERNAL MEDICINE | Facility: CLINIC | Age: 70
End: 2025-07-28
Payer: COMMERCIAL

## 2025-07-28 DIAGNOSIS — R68.82 DECREASED LIBIDO: Primary | ICD-10-CM

## 2025-07-28 DIAGNOSIS — E34.9 HYPOTESTOSTERONEMIA: ICD-10-CM

## 2025-07-28 DIAGNOSIS — R68.82 DECREASED LIBIDO: ICD-10-CM

## 2025-07-28 DIAGNOSIS — Z12.5 SCREENING FOR PROSTATE CANCER: Primary | ICD-10-CM

## 2025-07-28 RX ORDER — TESTOSTERONE 1.62 MG/G
4 GEL TRANSDERMAL DAILY
Qty: 10 G | Refills: 1 | Status: SHIPPED | OUTPATIENT
Start: 2025-07-28

## 2025-07-30 RX ORDER — TESTOSTERONE 1.62 MG/G
4 GEL TRANSDERMAL DAILY
Qty: 12.5 G | Refills: 0 | Status: SHIPPED | OUTPATIENT
Start: 2025-07-30

## 2025-07-30 NOTE — ADDENDUM NOTE
Addended by: ABIMBOLA FOSTER on: 7/30/2025 12:22 PM     Modules accepted: Orders     bilateral normal...

## 2025-08-18 ENCOUNTER — TELEPHONE (OUTPATIENT)
Dept: GASTROENTEROLOGY | Facility: CLINIC | Age: 70
End: 2025-08-18
Payer: COMMERCIAL

## (undated) DEVICE — TRAY PREP DRY SKIN SCRUB 067

## (undated) DEVICE — DRAPE LAP W/ARMBOARD 29410

## (undated) DEVICE — SU VICRYL 2-0 SH 27" UND J417H

## (undated) DEVICE — DRSG KERLIX FLUFFS X5

## (undated) DEVICE — SU VICRYL 3-0 SH 27" UND J416H

## (undated) DEVICE — SU CHROMIC 4-0 SH 27" G121H

## (undated) DEVICE — SU MONOCRYL 4-0 PS-2 18" UND Y496G

## (undated) DEVICE — SOL WATER IRRIG 1000ML BOTTLE 07139-09

## (undated) DEVICE — MANIFOLD NEPTUNE 4 PORT 700-20

## (undated) DEVICE — SU VICRYL 3-0 TIE 12X18" J904T

## (undated) DEVICE — PREP DYNA-HEX 4% CHG SCRUB 4OZ BOTTLE MDS098710

## (undated) DEVICE — ESU PENCIL SMOKE EVAC W/ROCKER SWITCH 0703-047-000

## (undated) DEVICE — SYR EAR BULB 3OZ 0035830

## (undated) DEVICE — GLOVE BIOGEL PI ULTRATOUCH G SZ 8.0 42180

## (undated) DEVICE — SOL NACL 0.9% IRRIG 1000ML BOTTLE 07138-09

## (undated) DEVICE — PACK MINOR SBA15MIFSE

## (undated) DEVICE — SUPPORTER ATHLETIC LG CLINITEX LF 67-1006

## (undated) DEVICE — ESU ELEC NDL 1" COATED/INSULATED E1465

## (undated) DEVICE — GLOVE BIOGEL PI ULTRATOUCH G SZ 7.5 42175

## (undated) DEVICE — SUCTION TIP YANKAUER W/O VENT K86

## (undated) RX ORDER — FENTANYL CITRATE 50 UG/ML
INJECTION, SOLUTION INTRAMUSCULAR; INTRAVENOUS
Status: DISPENSED
Start: 2024-03-06

## (undated) RX ORDER — CEFAZOLIN SODIUM 1 G/3ML
INJECTION, POWDER, FOR SOLUTION INTRAMUSCULAR; INTRAVENOUS
Status: DISPENSED
Start: 2024-03-06

## (undated) RX ORDER — ONDANSETRON 2 MG/ML
INJECTION INTRAMUSCULAR; INTRAVENOUS
Status: DISPENSED
Start: 2024-03-06

## (undated) RX ORDER — BUPIVACAINE HYDROCHLORIDE 5 MG/ML
INJECTION, SOLUTION EPIDURAL; INTRACAUDAL
Status: DISPENSED
Start: 2024-03-06

## (undated) RX ORDER — ACETAMINOPHEN 325 MG/1
TABLET ORAL
Status: DISPENSED
Start: 2024-03-06

## (undated) RX ORDER — PROPOFOL 10 MG/ML
INJECTION, EMULSION INTRAVENOUS
Status: DISPENSED
Start: 2024-03-06

## (undated) RX ORDER — DEXAMETHASONE SODIUM PHOSPHATE 4 MG/ML
INJECTION, SOLUTION INTRA-ARTICULAR; INTRALESIONAL; INTRAMUSCULAR; INTRAVENOUS; SOFT TISSUE
Status: DISPENSED
Start: 2024-03-06

## (undated) RX ORDER — GLYCOPYRROLATE 0.2 MG/ML
INJECTION, SOLUTION INTRAMUSCULAR; INTRAVENOUS
Status: DISPENSED
Start: 2024-03-06

## (undated) RX ORDER — GINSENG 100 MG
CAPSULE ORAL
Status: DISPENSED
Start: 2024-03-06

## (undated) RX ORDER — OXYCODONE HYDROCHLORIDE 5 MG/1
TABLET ORAL
Status: DISPENSED
Start: 2024-03-06